# Patient Record
Sex: FEMALE | Race: BLACK OR AFRICAN AMERICAN | NOT HISPANIC OR LATINO | Employment: OTHER | URBAN - METROPOLITAN AREA
[De-identification: names, ages, dates, MRNs, and addresses within clinical notes are randomized per-mention and may not be internally consistent; named-entity substitution may affect disease eponyms.]

---

## 2017-03-16 ENCOUNTER — HOSPITAL ENCOUNTER (EMERGENCY)
Facility: HOSPITAL | Age: 62
Discharge: HOME | End: 2017-03-16
Attending: EMERGENCY MEDICINE

## 2017-03-16 VITALS
BODY MASS INDEX: 39.27 KG/M2 | RESPIRATION RATE: 18 BRPM | HEART RATE: 74 BPM | SYSTOLIC BLOOD PRESSURE: 129 MMHG | TEMPERATURE: 97 F | HEIGHT: 60 IN | DIASTOLIC BLOOD PRESSURE: 61 MMHG | WEIGHT: 200 LBS | OXYGEN SATURATION: 98 %

## 2017-03-16 DIAGNOSIS — M54.31 SCIATICA OF RIGHT SIDE: Primary | ICD-10-CM

## 2017-03-16 PROCEDURE — 63600175 PHARM REV CODE 636 W HCPCS: Performed by: PHYSICIAN ASSISTANT

## 2017-03-16 PROCEDURE — 99283 EMERGENCY DEPT VISIT LOW MDM: CPT | Mod: 25

## 2017-03-16 PROCEDURE — 96372 THER/PROPH/DIAG INJ SC/IM: CPT

## 2017-03-16 RX ORDER — METHYLPREDNISOLONE 4 MG/1
TABLET ORAL
Qty: 1 PACKAGE | Refills: 0 | Status: SHIPPED | OUTPATIENT
Start: 2017-03-16 | End: 2017-04-06

## 2017-03-16 RX ORDER — DEXAMETHASONE SODIUM PHOSPHATE 4 MG/ML
8 INJECTION, SOLUTION INTRA-ARTICULAR; INTRALESIONAL; INTRAMUSCULAR; INTRAVENOUS; SOFT TISSUE
Status: COMPLETED | OUTPATIENT
Start: 2017-03-16 | End: 2017-03-16

## 2017-03-16 RX ORDER — METOPROLOL SUCCINATE 50 MG/1
50 TABLET, EXTENDED RELEASE ORAL DAILY
Status: ON HOLD | COMMUNITY
End: 2019-03-18 | Stop reason: SDUPTHER

## 2017-03-16 RX ORDER — KETOROLAC TROMETHAMINE 30 MG/ML
30 INJECTION, SOLUTION INTRAMUSCULAR; INTRAVENOUS
Status: COMPLETED | OUTPATIENT
Start: 2017-03-16 | End: 2017-03-16

## 2017-03-16 RX ADMIN — KETOROLAC TROMETHAMINE 30 MG: 30 INJECTION, SOLUTION INTRAMUSCULAR at 03:03

## 2017-03-16 RX ADMIN — DEXAMETHASONE SODIUM PHOSPHATE 8 MG: 4 INJECTION, SOLUTION INTRAMUSCULAR; INTRAVENOUS at 03:03

## 2017-03-16 NOTE — DISCHARGE INSTRUCTIONS

## 2017-03-16 NOTE — ED NOTES
Pt c/o lower back pain that radiates down R leg for approx 1 month.  Pt states she saw PCP for compliant and was prescribed gabapentin without relief.  Pt denies trauma.  Pt also denies relief from ibuprofen.

## 2017-03-16 NOTE — ED AVS SNAPSHOT
[facility] [address]  [place] [address]               Christie Damian   3/16/2017  2:41 PM   ED    Description:  Female : 1955   Department:  [facility]           Your Care was Coordinated By:     Provider Role From To    Virginia Peterson MD Attending Provider [phone] --    Caren Gonzalez PA-C Physician Assistant [phone] --      Reason for Visit     Sciatica           Diagnoses this Visit        Comments    Sciatica of right side    -  Primary       ED Disposition     None           To Do List            These Medications        Disp Refills Start End    methylPREDNISolone (MEDROL DOSEPACK) 4 mg tablet 1 Package 0 3/16/2017 2017    Dispense as written      [facility]     [facility] -  Assistance  Registered nurses in the [facility] provide clinical advisement, health education, appointment booking, and other advisory services.  Call for this free service at [phone].             Medications           Message regarding Medications     Verify the changes and/or additions to your medication regime listed below are the same as discussed with your clinician today.  If any of these changes or additions are incorrect, please notify your healthcare provider.        START taking these NEW medications        Refills    methylPREDNISolone (MEDROL DOSEPACK) 4 mg tablet 0    Sig: Dispense as written    Class: Print      These medications were administered today        Dose Freq    dexamethasone injection 8 mg 8 mg ED 1 Time    Sig: Inject 2 mLs (8 mg total) into the muscle ED 1 Time.    Class: Normal    Route: Intramuscular    Cosign for Ordering: Required by Virginia Peterson MD    ketorolac injection 30 mg 30 mg ED 1 Time    Sig: Inject 30 mg into the muscle ED 1 Time.    Class: Normal    Route: Intramuscular    Cosign for Ordering: Required by Virginia Peterson MD      STOP taking these  medications     lisinopril-hydrochlorothiazide (PRINZIDE,ZESTORETIC) 20-12.5 mg per tablet Take 1 tablet by mouth once daily.           Verify that the below list of medications is an accurate representation of the medications you are currently taking.  If none reported, the list may be blank. If incorrect, please contact your healthcare provider. Carry this list with you in case of emergency.           Current Medications     metoprolol succinate (TOPROL-XL) 50 MG 24 hr tablet Take 50 mg by mouth once daily.    amlodipine (NORVASC) 5 MG tablet Take 10 mg by mouth once daily.     aspirin 81 MG Chew Take 81 mg by mouth once daily.    lovastatin (ALTOPREV) 10 mg 24 hr tablet Take 10 mg by mouth every evening.    methylPREDNISolone (MEDROL DOSEPACK) 4 mg tablet Dispense as written           Clinical Reference Information           Your Vitals Were     BP Pulse Temp Resp Height Weight    166/74 84 97.1 °F (36.2 °C) (Oral) 16 5' (1.524 m) 90.7 kg (200 lb)    SpO2 BMI             100% 39.06 kg/m2         Allergies as of 3/16/2017        Reactions    Codeine Nausea And Vomiting    Lisinopril       Immunizations Administered on Date of Encounter - 3/16/2017     None      ED Micro, Lab, POCT     None      ED Imaging Orders     None        Discharge Instructions         Sciatica    Sciatica is a condition that causes pain in the lower back that spreads down into the buttock, hip, and leg. Sometimes the leg pain can happen without any back pain. Sciatica happens when a spinal nerve is irritated or has pressure put on it as comes out of the spinal canal in the lower back. This most often happens when a bulge or rupture of a nearby spinal disk presses on the nerve. Sciatica can also be caused by a narrowing of the spinal canal (spinal stenosis) or spasm of the muscle in the buttocks that the sciatic nerve passes through (pyriform muscle). Sciatica is also called lumbar radiculopathy.  Sciatica may begin after a sudden twisting  or bending force, such as in a car accident. Or it can happen after a simple awkward movement. In either case, muscle spasm often also happens. Muscle spasm makes the pain worse.  A healthcare provider makes a diagnosis of sciatica from your symptoms and a physical exam. Unless you had an injury from a car accident or fall, you usually wont have X-rays taken at this time. This is because the nerves and disks in your back cant be seen on an X-ray. If the provider sees signs of a compressed nerve, you will need to schedule an MRI scan as an outpatient. Signs of a compressed nerve include loss of strength in a leg.  Most sciatica gets better with medicine, exercise, and physical therapy. If your symptoms continue after at least 3 months of medical treatment, you may need surgery or injections to your lower back.  Home care  Follow these tips when caring for yourself at home:  · You may need to stay in bed the first few days. But as soon as possible, begin sitting up or walking. This will help you avoid problems that come from staying in bed for long periods.  · When in bed, try to find a position that is comfortable. A firm mattress is best. Try lying flat on your back with pillows under your knees. You can also try lying on your side with your knees bent up toward your chest and a pillow between your knees.  · Avoid sitting for long periods. This puts more stress on your lower back than standing or walking.  · Use heat from a hot shower, hot bath, or heating pad to help ease pain. Massage can also help. You can also try using an ice pack. You can make your own ice pack by putting ice cubes in a plastic bag. Wrap the bag in a thin towel. Try both heat and cold to see which works best. Use the method that feels best for 20 minutes several times a day.  · You may use acetaminophen or ibuprofen to ease pain, unless another pain medicine was prescribed. Note: If you have chronic liver or kidney disease, talk with your  healthcare provider before taking these medicines. Also talk with your provider if youve had a stomach ulcer or gastrointestinal bleeding.  · Use safe lifting methods. Dont lift anything heavier than 15 pounds until all of the pain is gone.  Follow-up care  Follow up with your healthcare provider, or as advised. You may need physical therapy or additional tests.  If X-rays were taken, a radiologist will look at them. You will be told of any new findings that may affect your care.  When to seek medical advice  Call your healthcare provider right away if any of these occur:  · Pain gets worse even after taking prescribed medicine  · Weakness or numbness in 1 or both legs or hips  · Numbness in your groin or genital area  · You cant control your bowel or bladder  · Fever  · Redness or swelling over your back or spine   Date Last Reviewed: 8/1/2016  © 7086-6956 [employer]. [address] [place] [address]. All rights reserved. This information is not intended as a substitute for professional medical care. Always follow your healthcare professional's instructions.          Your Scheduled Appointments     Mar 21, 2017 10:45 AM CDT   Mammo Screening with [facility]    [facility] [address]  [facility] [id number] [phone]              [facility] Sign-Up     Activating your [facility] account is as easy as 1-2-3!     1) Visit [email or web address], select Sign Up Now, enter this activation code and your date of birth, then select Next.  [email or web address]  Expires: 4/30/2017  3:09 PM      2) Create a username and password to use when you visit [facility] in the future and select a security question in case you lose your password and select Next.    3) Enter your e-mail address and click Sign Up!    Additional Information  If you have questions, please e-mail [email or web address] or call [phone] to talk to our [facility] staff. Remember, [facility] is  NOT to be used for urgent needs. For medical emergencies, dial 911.          [facility] complies with applicable Federal civil rights laws and does not discriminate on the basis of race, color, national origin, age, disability, or sex.        Language Assistance Services     ATTENTION: Language assistance services are available, free of charge. Please call [phone].      ATENCIÓN: Si habla español, tiene a potts disposición servicios gratuitos de asistencia lingüística. Llame al [phone].     CHÚ Ý: N?u b?n nói Ti?ng Vi?t, có các d?ch v? h? tr? ngôn ng? mi?n phí dành cho b?n. G?i s? [phone].

## 2017-03-16 NOTE — ED PROVIDER NOTES
Encounter Date: 3/16/2017       History     Chief Complaint   Patient presents with    Sciatica     right side; pain travels from right buttock to right toes; states pain is sharp and shooting; denies taking pain meds pta     Review of patient's allergies indicates:   Allergen Reactions    Codeine Nausea And Vomiting    Lisinopril      HPI Comments: Christie Damian, a 61 y.o. female that presents to the ED for pain that starts in her right buttock and radiates down her right leg x 4 weeks.  She describes the pain as sharp, shooting, worse with certain movement and worse when sitting.  She was seen by her PCP for this complaint about 2 weeks ago and was prescribed gabapentin 300 mg three times a day, but states that this medication makes her tired, therefore she just takes it at night.  The gabapentin does offer some relief at night time, but she has continued with the pain.  She denies any history of trauma or surgery to her back or hip.  She denies any fever, loss of bowel or bladder.        The history is provided by the patient.     Past Medical History:   Diagnosis Date    Hyperlipidemia     Hypertension      Past Surgical History:   Procedure Laterality Date    HYSTERECTOMY      LEG SURGERY      TONSILLECTOMY       No family history on file.  Social History   Substance Use Topics    Smoking status: Never Smoker    Smokeless tobacco: Not on file    Alcohol use No     Review of Systems   Constitutional: Negative for fever.   HENT: Positive for nosebleeds.    Genitourinary: Negative for dysuria and flank pain.   Musculoskeletal: Positive for arthralgias (R sided hip pain ). Negative for gait problem.   Skin: Negative for color change and rash.   Allergic/Immunologic: Negative for immunocompromised state.   Neurological: Negative for weakness and numbness.   Psychiatric/Behavioral: Negative for agitation and confusion.   All other systems reviewed and are negative.      Physical Exam   Initial Vitals    BP Pulse Resp Temp SpO2   03/16/17 1428 03/16/17 1428 03/16/17 1428 03/16/17 1428 03/16/17 1428   166/74 84 16 97.1 °F (36.2 °C) 100 %     Physical Exam    Nursing note and vitals reviewed.  Constitutional: She appears well-developed and well-nourished. No distress.   HENT:   Head: Normocephalic and atraumatic.   Right Ear: External ear normal.   Left Ear: External ear normal.   Nose: Nose normal.   Eyes: Conjunctivae and EOM are normal.   Neck: Normal range of motion.   Cardiovascular: Normal rate and regular rhythm.   Pulmonary/Chest: No respiratory distress. She has no wheezes. She has no rhonchi. She has no rales.   Abdominal: Soft. Bowel sounds are normal. She exhibits no distension. There is no tenderness. There is no rebound and no guarding.   Musculoskeletal: Normal range of motion.        Right hip: Normal.        Cervical back: Normal.        Thoracic back: Normal.        Lumbar back: Normal.   TTP over iscial tuberosity.  + SLR of right leg.  Increased pain with flexion and extension of spine.  No spinal step offs or spinous process tenderness.     Neurological: She is alert and oriented to person, place, and time. She has normal strength.   Skin: Skin is warm and dry. No rash noted. No erythema.   Psychiatric: She has a normal mood and affect. Thought content normal.         ED Course   Procedures  Labs Reviewed - No data to display          Medical Decision Making:   Initial Assessment:   Hip pain radiating down leg  Differential Diagnosis:   Sciatica, herniated disk, muscle strain   ED Management:  Symptoms and exam most consistent with sciatica.  Toradol and Decadron given with improvement of pain.  Patient was instructed to f/u with PCP for possible referral for physical therapy.  Strict return precautions given and patient verbalized understanding.    RX: medrol               Attending Attestation:     Physician Attestation Statement for NP/PA:   I discussed this assessment and plan of this  patient with the NP/PA, but I did not personally examine the patient. The face to face encounter was performed by the NP/PA.                  ED Course     Clinical Impression:   The encounter diagnosis was Sciatica of right side.          Caren Gonzalez PA-C  03/16/17 2014       Virginia Peterson MD  03/17/17 0409

## 2017-11-23 ENCOUNTER — HOSPITAL ENCOUNTER (EMERGENCY)
Facility: HOSPITAL | Age: 62
Discharge: HOME | End: 2017-11-23
Attending: EMERGENCY MEDICINE
Payer: MEDICARE

## 2017-11-23 VITALS
BODY MASS INDEX: 43 KG/M2 | HEIGHT: 60 IN | RESPIRATION RATE: 18 BRPM | SYSTOLIC BLOOD PRESSURE: 145 MMHG | OXYGEN SATURATION: 99 % | WEIGHT: 219 LBS | DIASTOLIC BLOOD PRESSURE: 65 MMHG | TEMPERATURE: 98 F | HEART RATE: 77 BPM

## 2017-11-23 DIAGNOSIS — M54.31 SCIATICA OF RIGHT SIDE: Primary | ICD-10-CM

## 2017-11-23 DIAGNOSIS — M67.442 GANGLION CYST OF FINGER OF LEFT HAND: ICD-10-CM

## 2017-11-23 PROCEDURE — 63600175 PHARM REV CODE 636 W HCPCS: Performed by: EMERGENCY MEDICINE

## 2017-11-23 PROCEDURE — 99283 EMERGENCY DEPT VISIT LOW MDM: CPT | Mod: 25

## 2017-11-23 PROCEDURE — 96372 THER/PROPH/DIAG INJ SC/IM: CPT

## 2017-11-23 RX ORDER — AMITRIPTYLINE HYDROCHLORIDE 10 MG/1
10 TABLET, FILM COATED ORAL NIGHTLY PRN
COMMUNITY

## 2017-11-23 RX ORDER — MELOXICAM 7.5 MG/1
7.5 TABLET ORAL DAILY
COMMUNITY
End: 2020-12-16

## 2017-11-23 RX ORDER — GABAPENTIN 300 MG/1
300 CAPSULE ORAL 3 TIMES DAILY
COMMUNITY
End: 2020-09-24 | Stop reason: SDUPTHER

## 2017-11-23 RX ORDER — METHOCARBAMOL 750 MG/1
1500 TABLET, FILM COATED ORAL 3 TIMES DAILY
Qty: 30 TABLET | Refills: 0 | Status: SHIPPED | OUTPATIENT
Start: 2017-11-23 | End: 2017-11-28

## 2017-11-23 RX ORDER — ORPHENADRINE CITRATE 30 MG/ML
60 INJECTION INTRAMUSCULAR; INTRAVENOUS
Status: COMPLETED | OUTPATIENT
Start: 2017-11-23 | End: 2017-11-23

## 2017-11-23 RX ORDER — KETOROLAC TROMETHAMINE 30 MG/ML
60 INJECTION, SOLUTION INTRAMUSCULAR; INTRAVENOUS
Status: COMPLETED | OUTPATIENT
Start: 2017-11-23 | End: 2017-11-23

## 2017-11-23 RX ORDER — OXYCODONE AND ACETAMINOPHEN 5; 325 MG/1; MG/1
1 TABLET ORAL EVERY 4 HOURS PRN
Qty: 15 TABLET | Refills: 0 | Status: SHIPPED | OUTPATIENT
Start: 2017-11-23 | End: 2018-04-11 | Stop reason: SDUPTHER

## 2017-11-23 RX ORDER — METFORMIN HYDROCHLORIDE 500 MG/1
500 TABLET ORAL 2 TIMES DAILY WITH MEALS
COMMUNITY
End: 2022-06-16 | Stop reason: DRUGHIGH

## 2017-11-23 RX ADMIN — KETOROLAC TROMETHAMINE 60 MG: 30 INJECTION, SOLUTION INTRAMUSCULAR at 03:11

## 2017-11-23 RX ADMIN — ORPHENADRINE CITRATE 60 MG: 30 INJECTION INTRAMUSCULAR; INTRAVENOUS at 03:11

## 2017-11-23 NOTE — ED NOTES
Pt sitting in on edge of bed, AAO x's 3. Pt stated that she came to the ER with c/o sciatic back pain on the right side that goes down her leg. Pt stated that she has a hx and usually she gets steroids that helps relieve the pain.   APPEARANCE: Alert, oriented and in no acute distress.  CARDIAC: Normal rate and rhythm, no murmur heard.   PERIPHERAL VASCULAR: peripheral pulses present. Normal cap refill. No edema. Warm to touch.    RESPIRATORY:Normal rate and effort, breath sounds clear bilaterally throughout chest. Respirations are equal and unlabored no obvious signs of distress.  GASTRO: soft, bowel sounds normal, no tenderness, no abdominal distention.  MUSC: Full ROM. No bony tenderness or soft tissue tenderness. No obvious deformity.  SKIN: Skin is warm and dry, normal skin turgor, mucous membranes moist.  NEURO: 5/5 strength major flexors/extensors bilaterally. Sensory intact to light touch bilaterally. Derek coma scale: eyes open spontaneously-4, oriented & converses-5, obeys commands-6. No neurological abnormalities.   MENTAL STATUS: awake, alert and aware of environment.  EYE: PERRL, both eyes: pupils brisk and reactive to light. Normal size.  ENT: EARS: no obvious drainage. NOSE: no active bleeding.

## 2017-11-23 NOTE — ED PROVIDER NOTES
Encounter Date: 11/23/2017       History     Chief Complaint   Patient presents with    Sciatica     Reports sciatic pain to R lower back radiating down R leg x1 week    Finger Pain     L middle finger, denies injury     Patient is a 62-year-old female who complains of a one-week history of right lower back pain that radiates into her right leg.  She denies any trauma or heavy lifting.  No abdominal pain, nausea or vomiting.  She has no dysuria or hematuria.  No fever or chills.  She denies any leg weakness or numbness.  She has a history of sciatica.  Patient also complains of a painful nodule to her left ring finger.  No finger trauma.  She is unsure how long the nodule has been there.      The history is provided by the patient.     Review of patient's allergies indicates:   Allergen Reactions    Codeine Nausea And Vomiting    Lisinopril      Past Medical History:   Diagnosis Date    Hyperlipidemia     Hypertension     Pre-diabetes     Sciatica      Past Surgical History:   Procedure Laterality Date    HYSTERECTOMY      LEG SURGERY      TONSILLECTOMY       History reviewed. No pertinent family history.  Social History   Substance Use Topics    Smoking status: Never Smoker    Smokeless tobacco: Never Used    Alcohol use No     Review of Systems   Constitutional: Negative for chills and fever.   Cardiovascular: Negative for chest pain.   Gastrointestinal: Negative for abdominal pain, diarrhea, nausea and vomiting.   Genitourinary: Negative for difficulty urinating, dysuria and hematuria.   Musculoskeletal: Positive for back pain.   Skin: Negative for color change and rash.   Neurological: Negative for numbness.   All other systems reviewed and are negative.      Physical Exam     Initial Vitals [11/23/17 1442]   BP Pulse Resp Temp SpO2   (!) 145/65 77 18 97.8 °F (36.6 °C) 99 %      MAP       91.67         Physical Exam    Nursing note and vitals reviewed.  Constitutional: No distress.   HENT:   Head:  Normocephalic and atraumatic.   Eyes: EOM are normal.   Neck: Normal range of motion. Neck supple.   Cardiovascular: Normal rate, regular rhythm and normal heart sounds.   Pulmonary/Chest: Breath sounds normal.   Abdominal: Soft. There is no tenderness.   Musculoskeletal: Normal range of motion.   There is a small approximately 1 cm tender mobile nodule of the left ring finger dorsal aspect.  No overlying erythema.    There was tenderness of the right lower lumbar paraspinous muscle extending on the upper right buttock.   Neurological: She is alert and oriented to person, place, and time. She has normal strength and normal reflexes. No sensory deficit.   Skin: Skin is warm and dry.   Psychiatric: Her behavior is normal. Thought content normal.         ED Course   Procedures  Labs Reviewed - No data to display          Medical Decision Making:   ED Management:  62-year-old female sciatica.  Patient was given 60 mg of Toradol and 60 mg of Norflex intramuscularly here in the ED.  She also seems to have a ganglion cyst of the left ring finger.  Patient is taking meloxicam for back pain with only minimal relief.  I will place her on Robaxin and Percocet.  I have suggested to follow-up with her primary physician as soon as able for recheck and further treatment as warranted.                   ED Course      Clinical Impression:   The primary encounter diagnosis was Sciatica of right side. A diagnosis of Ganglion cyst of finger of left hand was also pertinent to this visit.                           Wellington Aguillon MD  11/23/17 9063

## 2018-04-11 ENCOUNTER — HOSPITAL ENCOUNTER (EMERGENCY)
Facility: HOSPITAL | Age: 63
Discharge: HOME | End: 2018-04-11
Attending: EMERGENCY MEDICINE
Payer: MEDICARE

## 2018-04-11 VITALS
BODY MASS INDEX: 43.98 KG/M2 | TEMPERATURE: 98 F | DIASTOLIC BLOOD PRESSURE: 65 MMHG | HEIGHT: 60 IN | HEART RATE: 69 BPM | SYSTOLIC BLOOD PRESSURE: 121 MMHG | RESPIRATION RATE: 19 BRPM | WEIGHT: 224 LBS | OXYGEN SATURATION: 98 %

## 2018-04-11 DIAGNOSIS — M54.31 SCIATICA OF RIGHT SIDE: Primary | ICD-10-CM

## 2018-04-11 PROCEDURE — 99283 EMERGENCY DEPT VISIT LOW MDM: CPT | Mod: 25

## 2018-04-11 PROCEDURE — 63600175 PHARM REV CODE 636 W HCPCS: Performed by: PHYSICIAN ASSISTANT

## 2018-04-11 PROCEDURE — 96372 THER/PROPH/DIAG INJ SC/IM: CPT

## 2018-04-11 RX ORDER — KETOROLAC TROMETHAMINE 30 MG/ML
30 INJECTION, SOLUTION INTRAMUSCULAR; INTRAVENOUS
Status: COMPLETED | OUTPATIENT
Start: 2018-04-11 | End: 2018-04-11

## 2018-04-11 RX ORDER — OXYCODONE AND ACETAMINOPHEN 5; 325 MG/1; MG/1
1 TABLET ORAL EVERY 4 HOURS PRN
Qty: 15 TABLET | Refills: 0 | Status: SHIPPED | OUTPATIENT
Start: 2018-04-11 | End: 2022-06-16

## 2018-04-11 RX ORDER — METHOCARBAMOL 750 MG/1
1500 TABLET, FILM COATED ORAL 3 TIMES DAILY
Qty: 30 TABLET | Refills: 0 | Status: SHIPPED | OUTPATIENT
Start: 2018-04-11 | End: 2018-04-16

## 2018-04-11 RX ADMIN — KETOROLAC TROMETHAMINE 30 MG: 30 INJECTION, SOLUTION INTRAMUSCULAR at 02:04

## 2018-04-11 NOTE — ED PROVIDER NOTES
Encounter Date: 4/11/2018       History     Chief Complaint   Patient presents with    Leg Pain     Reports has sciatic nerve pain to RLE. Reports has been flaring up over the past week. Also complaining of R shoulder pain.      Christie Damian, a 62 y.o. female that presents to pain to right posterior hip area that is consistent with her previous sciatica flares.  Patient describes the pain as burning with radiation down her posterior leg.  Pain is worse with prolonged standing and slightly better with rest.  Treatments tried include ingestion of ibuprofen and gabapentin with little improvement of her pain.  She has not seen her PCP for this issue.  Denies any loss of bowel or bladder, fever.           The history is provided by the patient.     Review of patient's allergies indicates:   Allergen Reactions    Codeine Nausea And Vomiting    Lisinopril      Past Medical History:   Diagnosis Date    Hyperlipidemia     Hypertension     Pre-diabetes     Sciatica      Past Surgical History:   Procedure Laterality Date    HYSTERECTOMY      LEG SURGERY      TONSILLECTOMY       History reviewed. No pertinent family history.  Social History   Substance Use Topics    Smoking status: Never Smoker    Smokeless tobacco: Never Used    Alcohol use No     Review of Systems   Constitutional: Negative for fever.   Respiratory: Negative for shortness of breath.    Cardiovascular: Negative for chest pain and palpitations.   Musculoskeletal: Positive for arthralgias (right posterior hip pain that radiates down leg ). Negative for gait problem and neck stiffness.   Skin: Negative for color change and rash.   Allergic/Immunologic: Negative for immunocompromised state.   Neurological: Negative for dizziness.   Hematological: Negative for adenopathy.   Psychiatric/Behavioral: Negative for agitation and confusion.   All other systems reviewed and are negative.      Physical Exam     Initial Vitals [04/11/18 1349]   BP Pulse  Resp Temp SpO2   121/65 69 19 98.3 °F (36.8 °C) 98 %      MAP       83.67         Physical Exam    Nursing note and vitals reviewed.  Constitutional: She appears well-developed and well-nourished. She is not diaphoretic. No distress.   HENT:   Head: Normocephalic and atraumatic.   Right Ear: External ear normal.   Left Ear: External ear normal.   Nose: Nose normal.   Mouth/Throat: Oropharynx is clear and moist.   Eyes: Conjunctivae and EOM are normal. Right eye exhibits no discharge. Left eye exhibits no discharge. No scleral icterus.   Neck: Normal range of motion. No tracheal deviation present.   Cardiovascular: Normal rate, regular rhythm and normal heart sounds. Exam reveals no gallop and no friction rub.    No murmur heard.  Pulmonary/Chest: Breath sounds normal. No respiratory distress. She has no wheezes. She has no rhonchi. She has no rales.   Abdominal: Soft. Bowel sounds are normal. She exhibits no distension. There is no tenderness. There is no rebound and no guarding.   Musculoskeletal: Normal range of motion.        Right hip: Normal.        Legs:  Neurological: She is alert and oriented to person, place, and time.   Skin: Skin is warm and dry. Capillary refill takes less than 2 seconds.   Psychiatric: She has a normal mood and affect. Thought content normal.         ED Course   Procedures  Labs Reviewed - No data to display          Medical Decision Making:   Initial Assessment:   Right sided posterior hip pain with radiation down posterior leg  Differential Diagnosis:   Sciatica, herniated disk, muscle strain  ED Management:  Symptoms and exam most consistent with sciatica.  Toradol and Decadron given with improvement of pain.  Patient was instructed to f/u with PCP for possible referral for physical therapy.  Strict return precautions given and patient verbalized understanding.                Attending Attestation:     Physician Attestation Statement for NP/PA:   I discussed this assessment and plan  of this patient with the NP/PA, but I did not personally examine the patient. The face to face encounter was performed by the NP/PA.                     Clinical Impression:   The encounter diagnosis was Sciatica of right side.                           Caren Gonzalez PA-C  04/11/18 1449       Wellington Aguillon MD  04/11/18 0383

## 2018-10-20 ENCOUNTER — HOSPITAL ENCOUNTER (EMERGENCY)
Facility: HOSPITAL | Age: 63
Discharge: HOME | End: 2018-10-20
Attending: EMERGENCY MEDICINE
Payer: MEDICARE

## 2018-10-20 VITALS
DIASTOLIC BLOOD PRESSURE: 82 MMHG | OXYGEN SATURATION: 99 % | HEART RATE: 74 BPM | BODY MASS INDEX: 43.19 KG/M2 | SYSTOLIC BLOOD PRESSURE: 140 MMHG | RESPIRATION RATE: 16 BRPM | TEMPERATURE: 98 F | WEIGHT: 220 LBS | HEIGHT: 60 IN
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
## 2018-10-20 PROCEDURE — 99284 EMERGENCY DEPT VISIT MOD MDM: CPT

## 2018-10-20 PROCEDURE — 25000003 PHARM REV CODE 250: Performed by: PHYSICIAN ASSISTANT

## 2018-10-20 RX ORDER — HYDROXYZINE PAMOATE 25 MG/1
25 CAPSULE ORAL
Status: COMPLETED | OUTPATIENT
Start: 2018-10-20 | End: 2018-10-20

## 2018-10-20 RX ORDER — HYDROXYZINE HYDROCHLORIDE 25 MG/1
25 TABLET, FILM COATED ORAL 3 TIMES DAILY PRN
Qty: 15 TABLET | Refills: 0 | Status: SHIPPED | OUTPATIENT
Start: 2018-10-20 | End: 2018-10-25

## 2018-10-20 RX ORDER — TRIAMCINOLONE ACETONIDE 1 MG/G
CREAM TOPICAL 2 TIMES DAILY
Qty: 15 G | Refills: 0 | Status: SHIPPED | OUTPATIENT
Start: 2018-10-20 | End: 2022-06-16

## 2018-10-20 RX ADMIN — HYDROXYZINE PAMOATE 25 MG: 25 CAPSULE ORAL at 01:10

## 2018-10-20 NOTE — ED PROVIDER NOTES
Encounter Date: 10/20/2018       History     Chief Complaint   Patient presents with    Rash     c/o itchy scattered generalized rash since last night. Recently changed detergents.     62 yo female presents to the ED with complaints of a rash and itching x 1 day. Patient states her son bought the wrong detergent, she washed her clothes in it, wore them, and broke out in a rash yesterday. She states she is sensitive to detergents and perfumes. She has been applying calamine lotion without relief. Denies wheezing, difficulty breathing.       The history is provided by the patient. No  was used.     Review of patient's allergies indicates:   Allergen Reactions    Codeine Nausea And Vomiting    Lisinopril      Past Medical History:   Diagnosis Date    Hyperlipidemia     Hypertension     Pre-diabetes     Sciatica      Past Surgical History:   Procedure Laterality Date    HYSTERECTOMY      LEG SURGERY      TONSILLECTOMY       No family history on file.  Social History     Tobacco Use    Smoking status: Never Smoker    Smokeless tobacco: Never Used   Substance Use Topics    Alcohol use: No    Drug use: Not on file     Review of Systems   Respiratory: Negative for shortness of breath and wheezing.    Skin: Positive for rash.        Itching   All other systems reviewed and are negative.      Physical Exam     Initial Vitals [10/20/18 1232]   BP Pulse Resp Temp SpO2   (!) 145/70 62 16 98.7 °F (37.1 °C) 97 %      MAP       --         Physical Exam    Nursing note and vitals reviewed.  Constitutional: Vital signs are normal. She appears well-developed and well-nourished. No distress.   HENT:   Head: Normocephalic and atraumatic.   Nose: Nose normal.   Eyes: Lids are normal.   Neck: Normal range of motion and phonation normal.   Cardiovascular: Normal rate.   Pulmonary/Chest: Effort normal.   Abdominal: Normal appearance.   Musculoskeletal: Normal range of motion.   Neurological: She is alert  and oriented to person, place, and time.   Skin: Skin is warm and dry.   Multiple individual, raised erythematous papules to arms and legs and few to back of neck, consistent with insect bites.  See photo   Psychiatric: She has a normal mood and affect. Her speech is normal and behavior is normal. Judgment and thought content normal. Cognition and memory are normal.                 ED Course   Procedures  Labs Reviewed - No data to display       Imaging Results    None          Medical Decision Making:   Initial Assessment:   63-year-old female with multiple insect bites to arms and legs and few on back of neck.  Several of the bites appear to be any linear alignment, consistent with possible bed bugs.  The location and appearance of the bites is inconsistent with allergic reaction.   Differential Diagnosis:   Bed bugs, other insect bite  ED Management:  Given Atarax in ED.  Patient will be discharged with prescription for Atarax and triamcinolone and instructed to check her mattress for bedbugs and follow up with PCP as soon as possible.  Return to ED with any worsening of condition.                   ED Course as of Oct 20 1659   Sat Oct 20, 2018   1610 Attestation: The patient was seen independently from the midlevel or resident. The management and disposition was discussed with me.   The patient presents emergency department with multiple insect bites to her left leg and left forearm and a few on her neck and back.  The bite slip consistent with insect bites on the patient may have bed bugs, she will need to check her mattress.  The location of the insect bites his inconsistent with shingles and the appearance of the lesions is inconsistent with an allergic reaction. She will be discharged with atarax for itching.  [ST]      ED Course User Index  [ST] Carmencita Rg MD     Clinical Impression:   The encounter diagnosis was Multiple insect bites.                             Caro Garcia PA-C  10/20/18  [id number]

## 2018-10-20 NOTE — ED NOTES
Patient states her son brought home the wrong detergent Monday.  After washing clothes with it, she broke out in a rash.  She reports having sensitive skin and does react to perfume in products.  She has since re-washed all her clothes.  CO severe itching.    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

## 2019-03-17 ENCOUNTER — HOSPITAL ENCOUNTER (OUTPATIENT)
Facility: HOSPITAL | Age: 64
Discharge: HOME | End: 2019-03-18
Attending: EMERGENCY MEDICINE | Admitting: HOSPITALIST
Payer: COMMERCIAL

## 2019-03-17 DIAGNOSIS — E78.2 MIXED HYPERLIPIDEMIA: ICD-10-CM

## 2019-03-17 DIAGNOSIS — E11.9 CONTROLLED TYPE 2 DIABETES MELLITUS WITHOUT COMPLICATION, WITHOUT LONG-TERM CURRENT USE OF INSULIN: ICD-10-CM

## 2019-03-17 DIAGNOSIS — I51.89 GRADE I DIASTOLIC DYSFUNCTION: ICD-10-CM

## 2019-03-17 DIAGNOSIS — R07.9 CHEST PAIN: Primary | ICD-10-CM

## 2019-03-17 DIAGNOSIS — R06.02 SHORTNESS OF BREATH: ICD-10-CM

## 2019-03-17 DIAGNOSIS — I10 ESSENTIAL HYPERTENSION: ICD-10-CM

## 2019-03-17 DIAGNOSIS — R06.00 DYSPNEA: ICD-10-CM

## 2019-03-17 DIAGNOSIS — J98.11 ATELECTASIS: ICD-10-CM

## 2019-03-17 DIAGNOSIS — R06.02 SOB (SHORTNESS OF BREATH): ICD-10-CM

## 2019-03-17 PROBLEM — E78.5 HYPERLIPIDEMIA: Status: ACTIVE | Noted: 2019-03-17

## 2019-03-17 PROBLEM — R73.03 PRE-DIABETES: Status: ACTIVE | Noted: 2019-03-17

## 2019-03-17 LAB
ALBUMIN SERPL BCP-MCNC: 4.2 G/DL
ALP SERPL-CCNC: 75 U/L
ALT SERPL W/O P-5'-P-CCNC: 38 U/L
ANION GAP SERPL CALC-SCNC: 9 MMOL/L
AST SERPL-CCNC: 26 U/L
BASOPHILS # BLD AUTO: 0.02 K/UL
BASOPHILS NFR BLD: 0.3 %
BILIRUB SERPL-MCNC: 0.2 MG/DL
BNP SERPL-MCNC: 29 PG/ML
BUN SERPL-MCNC: 16 MG/DL
CALCIUM SERPL-MCNC: 10.2 MG/DL
CHLORIDE SERPL-SCNC: 104 MMOL/L
CO2 SERPL-SCNC: 27 MMOL/L
CREAT SERPL-MCNC: 0.9 MG/DL
D DIMER PPP IA.FEU-MCNC: 0.21 MG/L FEU
DIFFERENTIAL METHOD: ABNORMAL
EOSINOPHIL # BLD AUTO: 0.1 K/UL
EOSINOPHIL NFR BLD: 2.1 %
ERYTHROCYTE [DISTWIDTH] IN BLOOD BY AUTOMATED COUNT: 14.7 %
EST. GFR  (AFRICAN AMERICAN): >60 ML/MIN/1.73 M^2
EST. GFR  (NON AFRICAN AMERICAN): >60 ML/MIN/1.73 M^2
GLUCOSE SERPL-MCNC: 111 MG/DL
HCT VFR BLD AUTO: 36.8 %
HGB BLD-MCNC: 11.4 G/DL
LYMPHOCYTES # BLD AUTO: 4.4 K/UL
LYMPHOCYTES NFR BLD: 64.4 %
MCH RBC QN AUTO: 27 PG
MCHC RBC AUTO-ENTMCNC: 31 G/DL
MCV RBC AUTO: 87 FL
MONOCYTES # BLD AUTO: 0.5 K/UL
MONOCYTES NFR BLD: 7.2 %
NEUTROPHILS # BLD AUTO: 1.8 K/UL
NEUTROPHILS NFR BLD: 25.9 %
PLATELET # BLD AUTO: 281 K/UL
PMV BLD AUTO: 10.3 FL
POCT GLUCOSE: 104 MG/DL (ref 70–110)
POTASSIUM SERPL-SCNC: 3.8 MMOL/L
PROT SERPL-MCNC: 7.4 G/DL
RBC # BLD AUTO: 4.23 M/UL
SODIUM SERPL-SCNC: 140 MMOL/L
TROPONIN I SERPL DL<=0.01 NG/ML-MCNC: <0.006 NG/ML
WBC # BLD AUTO: 6.82 K/UL

## 2019-03-17 PROCEDURE — G0378 HOSPITAL OBSERVATION PER HR: HCPCS

## 2019-03-17 PROCEDURE — 93005 ELECTROCARDIOGRAM TRACING: CPT

## 2019-03-17 PROCEDURE — 80053 COMPREHEN METABOLIC PANEL: CPT

## 2019-03-17 PROCEDURE — 85025 COMPLETE CBC W/AUTO DIFF WBC: CPT

## 2019-03-17 PROCEDURE — 63600175 PHARM REV CODE 636 W HCPCS: Performed by: EMERGENCY MEDICINE

## 2019-03-17 PROCEDURE — 84484 ASSAY OF TROPONIN QUANT: CPT | Mod: 91

## 2019-03-17 PROCEDURE — 99220 PR INITIAL OBSERVATION CARE,LEVL III: CPT | Mod: ,,, | Performed by: FAMILY MEDICINE

## 2019-03-17 PROCEDURE — 99285 EMERGENCY DEPT VISIT HI MDM: CPT | Mod: 25

## 2019-03-17 PROCEDURE — 63600175 PHARM REV CODE 636 W HCPCS: Performed by: FAMILY MEDICINE

## 2019-03-17 PROCEDURE — 85379 FIBRIN DEGRADATION QUANT: CPT

## 2019-03-17 PROCEDURE — 96376 TX/PRO/DX INJ SAME DRUG ADON: CPT

## 2019-03-17 PROCEDURE — 93010 EKG 12-LEAD: ICD-10-PCS | Mod: ,,, | Performed by: INTERNAL MEDICINE

## 2019-03-17 PROCEDURE — 25000003 PHARM REV CODE 250: Performed by: EMERGENCY MEDICINE

## 2019-03-17 PROCEDURE — 84484 ASSAY OF TROPONIN QUANT: CPT

## 2019-03-17 PROCEDURE — 93010 ELECTROCARDIOGRAM REPORT: CPT | Mod: ,,, | Performed by: INTERNAL MEDICINE

## 2019-03-17 PROCEDURE — 83036 HEMOGLOBIN GLYCOSYLATED A1C: CPT

## 2019-03-17 PROCEDURE — 96374 THER/PROPH/DIAG INJ IV PUSH: CPT

## 2019-03-17 PROCEDURE — 83880 ASSAY OF NATRIURETIC PEPTIDE: CPT

## 2019-03-17 PROCEDURE — 25000003 PHARM REV CODE 250: Performed by: FAMILY MEDICINE

## 2019-03-17 PROCEDURE — 99220 PR INITIAL OBSERVATION CARE,LEVL III: ICD-10-PCS | Mod: ,,, | Performed by: FAMILY MEDICINE

## 2019-03-17 RX ORDER — NAPROXEN SODIUM 220 MG/1
81 TABLET, FILM COATED ORAL DAILY
Status: DISCONTINUED | OUTPATIENT
Start: 2019-03-18 | End: 2019-03-18 | Stop reason: HOSPADM

## 2019-03-17 RX ORDER — HYDRALAZINE HYDROCHLORIDE 20 MG/ML
10 INJECTION INTRAMUSCULAR; INTRAVENOUS EVERY 8 HOURS PRN
Status: DISCONTINUED | OUTPATIENT
Start: 2019-03-17 | End: 2019-03-18 | Stop reason: HOSPADM

## 2019-03-17 RX ORDER — GLUCAGON 1 MG
1 KIT INJECTION
Status: DISCONTINUED | OUTPATIENT
Start: 2019-03-17 | End: 2019-03-18 | Stop reason: HOSPADM

## 2019-03-17 RX ORDER — HYDRALAZINE HYDROCHLORIDE 20 MG/ML
10 INJECTION INTRAMUSCULAR; INTRAVENOUS EVERY 8 HOURS PRN
Status: DISCONTINUED | OUTPATIENT
Start: 2019-03-17 | End: 2019-03-17 | Stop reason: SDUPTHER

## 2019-03-17 RX ORDER — AMLODIPINE BESYLATE 5 MG/1
10 TABLET ORAL DAILY
Status: DISCONTINUED | OUTPATIENT
Start: 2019-03-18 | End: 2019-03-18

## 2019-03-17 RX ORDER — ONDANSETRON 2 MG/ML
4 INJECTION INTRAMUSCULAR; INTRAVENOUS EVERY 8 HOURS PRN
Status: DISCONTINUED | OUTPATIENT
Start: 2019-03-17 | End: 2019-03-18 | Stop reason: HOSPADM

## 2019-03-17 RX ORDER — HYDRALAZINE HYDROCHLORIDE 20 MG/ML
10 INJECTION INTRAMUSCULAR; INTRAVENOUS
Status: DISCONTINUED | OUTPATIENT
Start: 2019-03-17 | End: 2019-03-18

## 2019-03-17 RX ORDER — HYDRALAZINE HYDROCHLORIDE 20 MG/ML
10 INJECTION INTRAMUSCULAR; INTRAVENOUS
Status: COMPLETED | OUTPATIENT
Start: 2019-03-17 | End: 2019-03-17

## 2019-03-17 RX ORDER — LOSARTAN POTASSIUM 50 MG/1
100 TABLET ORAL ONCE
Status: COMPLETED | OUTPATIENT
Start: 2019-03-17 | End: 2019-03-17

## 2019-03-17 RX ORDER — ACETAMINOPHEN 325 MG/1
650 TABLET ORAL EVERY 8 HOURS PRN
Status: DISCONTINUED | OUTPATIENT
Start: 2019-03-17 | End: 2019-03-18 | Stop reason: HOSPADM

## 2019-03-17 RX ORDER — SODIUM CHLORIDE 0.9 % (FLUSH) 0.9 %
3 SYRINGE (ML) INJECTION EVERY 8 HOURS
Status: DISCONTINUED | OUTPATIENT
Start: 2019-03-18 | End: 2019-03-18 | Stop reason: HOSPADM

## 2019-03-17 RX ORDER — HYDRALAZINE HYDROCHLORIDE 20 MG/ML
10 INJECTION INTRAMUSCULAR; INTRAVENOUS ONCE
Status: COMPLETED | OUTPATIENT
Start: 2019-03-17 | End: 2019-03-17

## 2019-03-17 RX ORDER — IBUPROFEN 200 MG
24 TABLET ORAL
Status: DISCONTINUED | OUTPATIENT
Start: 2019-03-17 | End: 2019-03-18 | Stop reason: HOSPADM

## 2019-03-17 RX ORDER — AMITRIPTYLINE HYDROCHLORIDE 10 MG/1
10 TABLET, FILM COATED ORAL NIGHTLY PRN
Status: DISCONTINUED | OUTPATIENT
Start: 2019-03-17 | End: 2019-03-18 | Stop reason: HOSPADM

## 2019-03-17 RX ORDER — ATORVASTATIN CALCIUM 10 MG/1
10 TABLET, FILM COATED ORAL DAILY
Status: DISCONTINUED | OUTPATIENT
Start: 2019-03-18 | End: 2019-03-18 | Stop reason: HOSPADM

## 2019-03-17 RX ORDER — INSULIN ASPART 100 [IU]/ML
0-5 INJECTION, SOLUTION INTRAVENOUS; SUBCUTANEOUS
Status: DISCONTINUED | OUTPATIENT
Start: 2019-03-17 | End: 2019-03-18 | Stop reason: HOSPADM

## 2019-03-17 RX ORDER — GABAPENTIN 300 MG/1
300 CAPSULE ORAL 3 TIMES DAILY
Status: DISCONTINUED | OUTPATIENT
Start: 2019-03-18 | End: 2019-03-18 | Stop reason: HOSPADM

## 2019-03-17 RX ORDER — ASPIRIN 325 MG
325 TABLET ORAL
Status: COMPLETED | OUTPATIENT
Start: 2019-03-17 | End: 2019-03-17

## 2019-03-17 RX ORDER — SODIUM CHLORIDE 0.9 % (FLUSH) 0.9 %
5 SYRINGE (ML) INJECTION
Status: DISCONTINUED | OUTPATIENT
Start: 2019-03-17 | End: 2019-03-18 | Stop reason: HOSPADM

## 2019-03-17 RX ORDER — IBUPROFEN 200 MG
16 TABLET ORAL
Status: DISCONTINUED | OUTPATIENT
Start: 2019-03-17 | End: 2019-03-18 | Stop reason: HOSPADM

## 2019-03-17 RX ORDER — METOPROLOL SUCCINATE 50 MG/1
50 TABLET, EXTENDED RELEASE ORAL DAILY
Status: DISCONTINUED | OUTPATIENT
Start: 2019-03-18 | End: 2019-03-18 | Stop reason: HOSPADM

## 2019-03-17 RX ADMIN — HYDRALAZINE HYDROCHLORIDE 10 MG: 20 INJECTION INTRAMUSCULAR; INTRAVENOUS at 05:03

## 2019-03-17 RX ADMIN — HYDRALAZINE HYDROCHLORIDE 10 MG: 20 INJECTION INTRAMUSCULAR; INTRAVENOUS at 10:03

## 2019-03-17 RX ADMIN — ASPIRIN 325 MG ORAL TABLET 325 MG: 325 PILL ORAL at 07:03

## 2019-03-17 RX ADMIN — HYDRALAZINE HYDROCHLORIDE 10 MG: 20 INJECTION INTRAMUSCULAR; INTRAVENOUS at 08:03

## 2019-03-17 RX ADMIN — LOSARTAN POTASSIUM 100 MG: 50 TABLET, FILM COATED ORAL at 10:03

## 2019-03-17 RX ADMIN — LOSARTAN POTASSIUM 100 MG: 50 TABLET, FILM COATED ORAL at 06:03

## 2019-03-17 RX ADMIN — NITROGLYCERIN 0.5 INCH: 20 OINTMENT TOPICAL at 07:03

## 2019-03-17 NOTE — ED NOTES
Pt c/o htn for the past few days, and states it was 228/108 at the pharmacy today pta. Reports she has been taking her bp medications as prescribed, but states her losartan was recalled so she did not take it today and went to the pharmacy to exchange it for her new prescription today. Pt also c/o nausea, dizziness, headache, shortness of breath, and chest pain for the past 2 days. Denies any pain at this time, but c/o some SOB on exertion. Respirations even, unlabored. Normal sinus rhythm on monitor. No neurologic deficits noted.

## 2019-03-17 NOTE — ED PROVIDER NOTES
Encounter Date: 3/17/2019    SCRIBE #1 NOTE: I, Angie Flynn, am scribing for, and in the presence of,  Dr. Blake. I have scribed the entire note.       History     Chief Complaint   Patient presents with    Hypertension     took home b/p twice, with reading over 200, pt also complains of cheast pain x 2 days,  and intermitent CAMPOS x 1 week      Christie Damian is a 63 y.o. female who  has a past medical history of Hyperlipidemia, Hypertension, Pre-diabetes, and Sciatica.    The patient presents to the ED due to elevated blood pressure. She reports onset of symptoms was 3 weeks ago. The patient wasn't concerned about her pressure being elevated until she began to feel bad. She notes over the last 2 days she has been feeling nauseated, dizzy and short of breath with chest pain. The patient denies any chest pain currently. She also admits to having a headache. She notes earlier today while at the pharmacy her blood pressure was 228/108. The patient states she has been compliant with her hypertensive medications.       The history is provided by the patient.     Review of patient's allergies indicates:   Allergen Reactions    Codeine Nausea And Vomiting    Lisinopril      Past Medical History:   Diagnosis Date    Hyperlipidemia     Hypertension     Pre-diabetes     Sciatica      Past Surgical History:   Procedure Laterality Date    HYSTERECTOMY      LEG SURGERY      TONSILLECTOMY       No family history on file.  Social History     Tobacco Use    Smoking status: Never Smoker    Smokeless tobacco: Never Used   Substance Use Topics    Alcohol use: No    Drug use: Not on file     Review of Systems   Constitutional: Negative for chills and fever.   HENT: Negative for sore throat.    Respiratory: Positive for shortness of breath. Negative for cough.    Cardiovascular: Negative for chest pain.   Gastrointestinal: Positive for nausea. Negative for vomiting.   Genitourinary: Negative for dysuria, frequency  and urgency.   Musculoskeletal: Negative for back pain, neck pain and neck stiffness.   Skin: Negative for rash and wound.   Neurological: Positive for headaches. Negative for syncope and weakness.   Hematological: Does not bruise/bleed easily.   Psychiatric/Behavioral: Negative for agitation, behavioral problems and confusion.       Physical Exam     Initial Vitals [03/17/19 1615]   BP Pulse Resp Temp SpO2   (!) 198/100 75 18 97.8 °F (36.6 °C) 98 %      MAP       --         Physical Exam    Nursing note and vitals reviewed.  Constitutional: She appears well-developed and well-nourished. She is not diaphoretic. No distress.   HENT:   Head: Normocephalic and atraumatic.   Mouth/Throat: Oropharynx is clear and moist.   Eyes: Conjunctivae and EOM are normal. Pupils are equal, round, and reactive to light.   Neck: Normal range of motion. Neck supple.   Cardiovascular: Normal rate, regular rhythm and normal heart sounds. Exam reveals no gallop and no friction rub.    No murmur heard.  Pulmonary/Chest: Breath sounds normal. She has no wheezes. She has no rhonchi. She has no rales.   Abdominal: Soft. Bowel sounds are normal. There is no tenderness. There is no rebound and no guarding.   Musculoskeletal: Normal range of motion. She exhibits no edema or tenderness.   Lymphadenopathy:     She has no cervical adenopathy.   Neurological: She is alert and oriented to person, place, and time. She has normal strength.   Skin: Skin is warm and dry. Capillary refill takes less than 2 seconds. No rash noted.         ED Course   Procedures  Labs Reviewed   COMPREHENSIVE METABOLIC PANEL - Abnormal; Notable for the following components:       Result Value    Glucose 111 (*)     All other components within normal limits   CBC W/ AUTO DIFFERENTIAL - Abnormal; Notable for the following components:    Hemoglobin 11.4 (*)     Hematocrit 36.8 (*)     MCHC 31.0 (*)     RDW 14.7 (*)     Gran% 25.9 (*)     Lymph% 64.4 (*)     All other  components within normal limits   D DIMER, QUANTITATIVE   B-TYPE NATRIURETIC PEPTIDE   TROPONIN I     EKG Readings: (Independently Interpreted)   Sinus rhythm with 1st degree AV block. Ventricular rate of 64. T wave inversion in lead III. No STEMI   Other EKG Interpretations: Repeat EKG: Sinus rhythm with 1st degree AV block. Ventricular rate of 63. T wave inversion in lead III. No STEMI. No significant change from previous.        Imaging Results          X-Ray Chest PA And Lateral (Final result)  Result time 03/17/19 18:46:24    Final result by Job Johnson MD (03/17/19 18:46:24)                 Impression:      Findings suggesting sequela of pulmonary edema/CHF pattern, without focal consolidation.  Interstitial pneumonia not excluded.      Electronically signed by: Job Johnson MD  Date:    03/17/2019  Time:    18:46             Narrative:    EXAMINATION:  XR CHEST PA AND LATERAL    CLINICAL HISTORY:  Dyspnea, unspecified    TECHNIQUE:  PA and lateral views of the chest were performed.    COMPARISON:  Chest radiograph 03/17/2019    FINDINGS:  Resolution is limited by body habitus with underpenetration.    Cardiomediastinal silhouette is midline and prominent similar to prior.  There is mild prominence of the central pulmonary vasculature with mild diffuse interstitial coarsening.  Bibasilar scattered linear opacities consistent with subsegmental scarring versus atelectasis and chronic right middle lobe platelike scarring versus atelectasis.  The lungs are otherwise well expanded without focal consolidation, pleural effusion or pneumothorax.  Osseous structures show degenerative change without acute process seen.                               X-Ray Chest AP Portable (Final result)     Abnormal  Result time 03/17/19 17:04:13    Final result by Job Johnson MD (03/17/19 17:04:13)                 Impression:      Left basilar subtle opacities which could represent artifact versus airspace disease including  aspiration or pneumonia.  Short-term follow-up chest radiography after therapy is recommended to ensure resolution.    This report was flagged in Epic as abnormal.      Electronically signed by: Job Johnson MD  Date:    03/17/2019  Time:    17:04             Narrative:    EXAMINATION:  XR CHEST AP PORTABLE    CLINICAL HISTORY:  shortness of breath;    TECHNIQUE:  Single frontal view of the chest was performed.    COMPARISON:  Chest radiograph 04/24/2016    FINDINGS:  Monitoring leads overlie the chest.  Large body habitus.  No detrimental change.    Cardiomediastinal silhouette is upper limits of normal in size without evidence of failure, which may be magnified by AP portable technique and chest wall.    Pulmonary vasculature and hilar regions are within normal limits.  Few scattered linear opacities at the lung bases suggesting subsegmental scarring versus atelectasis.  Subtle opacities at the left lung base.  No large pleural effusion or pneumothorax.  No acute osseous process seen.  PA and lateral views can be obtained.                                 Medical Decision Making:   Initial Assessment:   63 y,o female reports chest pain, dizziness, hypertension and shortness of breath.Today she denies chest pain on my evaluation. She does complain of shortness of breath.  Differential Diagnosis:   Differential Diagnosis includes, but is not limited to:  PE, MI/ACS, pneumothorax, pericardial effusion/tamonade, pneumonia, lung abscess, pericarditis/myocarditis, pleural effusion, lung mass, CHF exacerbation, asthma exacerbation, COPD exacerbation, aspirated/ingested foreign body, airway obstruction, CO poisoning, anemia, metabolic derangement, allergy/atopy, influenza, viral URI, viral syndrome.    Independently Interpreted Test(s):   I have ordered and independently interpreted EKG Reading(s) - see prior notes  Clinical Tests:   Lab Tests: Ordered and Reviewed  Radiological Study: Ordered and Reviewed  Medical Tests:  Ordered and Reviewed  ED Management:  7:20 PM On re-evaluation the patient reports chest pain. Will obtain EKG and reassess    7:26 PM Case discussed with Dr. Harry, agrees with admission to the CDU    Additional MDM:   Heart Score:    History:          Slightly suspicious.  ECG:             Nonspecific repolarisation disturbance  Age:               45-65 years  Risk factors: >= 3 risk factors or history of atherosclerotic disease  Troponin:       Less than or equal to normal limit  Final Score: 4                    ED Course as of Mar 17 1928   Sun Mar 17, 2019   1926 Potassium: 3.8 [RN]      ED Course User Index  [RN] Vipul Blake Jr., MD     Clinical Impression:     1. Shortness of breath    2. Dyspnea    3. Chest pain        Disposition:   Disposition: Placed in Observation  Condition: Fair    Scribe attestation I, Vipul Blake,  personally performed the services described in this documentation. All medical record entries made by the scribe were at my direction and in my presence.  I have reviewed the chart and agree that the record reflects my personal performance and is accurate and complete. Vipul Blake M.D. 1:11 PM03/20/2019                       Vipul Blake Jr., MD  03/20/19 3735

## 2019-03-18 VITALS
HEIGHT: 59 IN | OXYGEN SATURATION: 97 % | RESPIRATION RATE: 18 BRPM | TEMPERATURE: 99 F | BODY MASS INDEX: 45.16 KG/M2 | WEIGHT: 224 LBS | SYSTOLIC BLOOD PRESSURE: 159 MMHG | HEART RATE: 78 BPM | DIASTOLIC BLOOD PRESSURE: 73 MMHG

## 2019-03-18 PROBLEM — R07.9 CHEST PAIN: Status: RESOLVED | Noted: 2019-03-17 | Resolved: 2019-03-18

## 2019-03-18 PROBLEM — R06.02 SOB (SHORTNESS OF BREATH): Status: RESOLVED | Noted: 2019-03-17 | Resolved: 2019-03-18

## 2019-03-18 PROBLEM — I51.89 GRADE I DIASTOLIC DYSFUNCTION: Status: ACTIVE | Noted: 2019-03-18

## 2019-03-18 LAB
ALBUMIN SERPL BCP-MCNC: 3.8 G/DL
ALP SERPL-CCNC: 70 U/L
ALT SERPL W/O P-5'-P-CCNC: 35 U/L
ANION GAP SERPL CALC-SCNC: 9 MMOL/L
ANION GAP SERPL CALC-SCNC: 9 MMOL/L
AORTIC ROOT ANNULUS: 2.5 CM
AST SERPL-CCNC: 20 U/L
AV INDEX (PROSTH): 0.69
AV MEAN GRADIENT: 5.8 MMHG
AV PEAK GRADIENT: 8.18 MMHG
AV VALVE AREA: 2.15 CM2
AV VELOCITY RATIO: 0.65
BASOPHILS # BLD AUTO: 0.02 K/UL
BASOPHILS NFR BLD: 0.4 %
BILIRUB SERPL-MCNC: 0.5 MG/DL
BSA FOR ECHO PROCEDURE: 2.06 M2
BUN SERPL-MCNC: 12 MG/DL
BUN SERPL-MCNC: 12 MG/DL
CALCIUM SERPL-MCNC: 9.8 MG/DL
CALCIUM SERPL-MCNC: 9.8 MG/DL
CHLORIDE SERPL-SCNC: 106 MMOL/L
CHLORIDE SERPL-SCNC: 106 MMOL/L
CO2 SERPL-SCNC: 26 MMOL/L
CO2 SERPL-SCNC: 26 MMOL/L
CREAT SERPL-MCNC: 0.8 MG/DL
CREAT SERPL-MCNC: 0.8 MG/DL
CV ECHO LV RWT: 0.5 CM
DIFFERENTIAL METHOD: ABNORMAL
DOP CALC AO PEAK VEL: 1.43 M/S
DOP CALC AO VTI: 30.62 CM
DOP CALC LVOT AREA: 3.11 CM2
DOP CALC LVOT DIAMETER: 1.99 CM
DOP CALC LVOT PEAK VEL: 0.93 M/S
DOP CALC LVOT STROKE VOLUME: 65.81 CM3
DOP CALCLVOT PEAK VEL VTI: 21.17 CM
E WAVE DECELERATION TIME: 268.56 MSEC
E/A RATIO: 1.14
ECHO LV POSTERIOR WALL: 1 CM (ref 0.6–1.1)
EOSINOPHIL # BLD AUTO: 0.1 K/UL
EOSINOPHIL NFR BLD: 2.4 %
ERYTHROCYTE [DISTWIDTH] IN BLOOD BY AUTOMATED COUNT: 14.7 %
EST. GFR  (AFRICAN AMERICAN): >60 ML/MIN/1.73 M^2
EST. GFR  (AFRICAN AMERICAN): >60 ML/MIN/1.73 M^2
EST. GFR  (NON AFRICAN AMERICAN): >60 ML/MIN/1.73 M^2
EST. GFR  (NON AFRICAN AMERICAN): >60 ML/MIN/1.73 M^2
ESTIMATED AVG GLUCOSE: 134 MG/DL
ESTIMATED AVG GLUCOSE: 134 MG/DL
FRACTIONAL SHORTENING: 23 % (ref 28–44)
GLUCOSE SERPL-MCNC: 111 MG/DL
GLUCOSE SERPL-MCNC: 111 MG/DL
HBA1C MFR BLD HPLC: 6.3 %
HBA1C MFR BLD HPLC: 6.3 %
HCT VFR BLD AUTO: 37.6 %
HGB BLD-MCNC: 11.8 G/DL
INTERVENTRICULAR SEPTUM: 0.99 CM (ref 0.6–1.1)
IVRT: 0.12 MSEC
LA MAJOR: 4.91 CM
LA MINOR: 5.03 CM
LA WIDTH: 4.05 CM
LEFT ATRIUM SIZE: 3.65 CM
LEFT ATRIUM VOLUME INDEX: 32.3 ML/M2
LEFT ATRIUM VOLUME: 62.44 CM3
LEFT INTERNAL DIMENSION IN SYSTOLE: 3.07 CM (ref 2.1–4)
LEFT VENTRICLE DIASTOLIC VOLUME INDEX: 35.95 ML/M2
LEFT VENTRICLE DIASTOLIC VOLUME: 69.56 ML
LEFT VENTRICLE MASS INDEX: 64.9 G/M2
LEFT VENTRICLE SYSTOLIC VOLUME INDEX: 19.2 ML/M2
LEFT VENTRICLE SYSTOLIC VOLUME: 37.06 ML
LEFT VENTRICULAR INTERNAL DIMENSION IN DIASTOLE: 3.99 CM (ref 3.5–6)
LEFT VENTRICULAR MASS: 125.67 G
LV LATERAL E/E' RATIO: 17.6
LYMPHOCYTES # BLD AUTO: 3 K/UL
LYMPHOCYTES NFR BLD: 55.3 %
MCH RBC QN AUTO: 26.8 PG
MCHC RBC AUTO-ENTMCNC: 31.4 G/DL
MCV RBC AUTO: 86 FL
MONOCYTES # BLD AUTO: 0.3 K/UL
MONOCYTES NFR BLD: 5.9 %
MV PEAK A VEL: 0.77 M/S
MV PEAK E VEL: 0.88 M/S
NEUTROPHILS # BLD AUTO: 1.9 K/UL
NEUTROPHILS NFR BLD: 35.8 %
PISA TR MAX VEL: 2.52 M/S
PLATELET # BLD AUTO: 273 K/UL
PMV BLD AUTO: 10.5 FL
POCT GLUCOSE: 141 MG/DL (ref 70–110)
POCT GLUCOSE: 87 MG/DL (ref 70–110)
POCT GLUCOSE: 99 MG/DL (ref 70–110)
POTASSIUM SERPL-SCNC: 3.8 MMOL/L
POTASSIUM SERPL-SCNC: 3.8 MMOL/L
PROT SERPL-MCNC: 7.1 G/DL
PULM VEIN S/D RATIO: 1.03
PV PEAK D VEL: 0.3 M/S
PV PEAK S VEL: 0.31 M/S
RA MAJOR: 4.6 CM
RA PRESSURE: 3 MMHG
RBC # BLD AUTO: 4.4 M/UL
RIGHT VENTRICULAR END-DIASTOLIC DIMENSION: 2.72 CM
SODIUM SERPL-SCNC: 141 MMOL/L
SODIUM SERPL-SCNC: 141 MMOL/L
TDI LATERAL: 0.05
TR MAX PG: 25.4 MMHG
TROPONIN I SERPL DL<=0.01 NG/ML-MCNC: <0.006 NG/ML
TV REST PULMONARY ARTERY PRESSURE: 28 MMHG
WBC # BLD AUTO: 5.41 K/UL

## 2019-03-18 PROCEDURE — 94761 N-INVAS EAR/PLS OXIMETRY MLT: CPT

## 2019-03-18 PROCEDURE — 94799 UNLISTED PULMONARY SVC/PX: CPT

## 2019-03-18 PROCEDURE — 36415 COLL VENOUS BLD VENIPUNCTURE: CPT

## 2019-03-18 PROCEDURE — G0378 HOSPITAL OBSERVATION PER HR: HCPCS

## 2019-03-18 PROCEDURE — 25000003 PHARM REV CODE 250: Performed by: PHYSICIAN ASSISTANT

## 2019-03-18 PROCEDURE — A4216 STERILE WATER/SALINE, 10 ML: HCPCS | Performed by: FAMILY MEDICINE

## 2019-03-18 PROCEDURE — 84484 ASSAY OF TROPONIN QUANT: CPT

## 2019-03-18 PROCEDURE — 85025 COMPLETE CBC W/AUTO DIFF WBC: CPT

## 2019-03-18 PROCEDURE — 80053 COMPREHEN METABOLIC PANEL: CPT

## 2019-03-18 PROCEDURE — 25000003 PHARM REV CODE 250: Performed by: FAMILY MEDICINE

## 2019-03-18 RX ORDER — LOSARTAN POTASSIUM 50 MG/1
100 TABLET ORAL DAILY
Status: DISCONTINUED | OUTPATIENT
Start: 2019-03-18 | End: 2019-03-18 | Stop reason: HOSPADM

## 2019-03-18 RX ORDER — LOSARTAN POTASSIUM 100 MG/1
100 TABLET ORAL DAILY
COMMUNITY

## 2019-03-18 RX ORDER — NITROGLYCERIN 0.4 MG/1
0.4 TABLET SUBLINGUAL EVERY 5 MIN PRN
Status: DISCONTINUED | OUTPATIENT
Start: 2019-03-18 | End: 2019-03-18 | Stop reason: HOSPADM

## 2019-03-18 RX ORDER — METOPROLOL SUCCINATE 50 MG/1
100 TABLET, EXTENDED RELEASE ORAL DAILY
Qty: 30 TABLET | Refills: 3 | Status: SHIPPED | OUTPATIENT
Start: 2019-03-18 | End: 2020-09-24

## 2019-03-18 RX ADMIN — GABAPENTIN 300 MG: 300 CAPSULE ORAL at 04:03

## 2019-03-18 RX ADMIN — Medication 3 ML: at 01:03

## 2019-03-18 RX ADMIN — ATORVASTATIN CALCIUM 10 MG: 10 TABLET, FILM COATED ORAL at 08:03

## 2019-03-18 RX ADMIN — GABAPENTIN 300 MG: 300 CAPSULE ORAL at 08:03

## 2019-03-18 RX ADMIN — Medication 3 ML: at 06:03

## 2019-03-18 RX ADMIN — ASPIRIN 81 MG 81 MG: 81 TABLET ORAL at 08:03

## 2019-03-18 RX ADMIN — METOPROLOL SUCCINATE 50 MG: 50 TABLET, EXTENDED RELEASE ORAL at 08:03

## 2019-03-18 RX ADMIN — LOSARTAN POTASSIUM 100 MG: 50 TABLET, FILM COATED ORAL at 01:03

## 2019-03-18 NOTE — PLAN OF CARE
Problem: Adult Inpatient Plan of Care  Goal: Plan of Care Review  Outcome: Ongoing (interventions implemented as appropriate)  Patient on RA with sats as documented.  IS done

## 2019-03-18 NOTE — HOSPITAL COURSE
Patient was admitted for observation to the CDU. Troponin was trended and remained negative. Echo showed EF of 65%, grade 1 DD, no pulmonary HTN. Telemetry monitoring showed NSR, no other significant arrhythmias or ectopy. Her home metoprolol was increased to 100 mg PO daily for tighter blood pressure control. Patient remained hemodynamically stable and on room air without complaint of chest pain or shortness of breath. Recommended incentive spirometry and deep breathing exercises for atelectasis seen on CXR. She was discharged to home in good condition with referral to Cardiology placed for possible outpatient stress test.

## 2019-03-18 NOTE — ED NOTES
Introduced self to patient, patient is a&ox3. She c/o chest pain at this time 8/10, Dr. Blake is at bedside and is aware, verbally ordered repeat EKG. Dr. Blake also aware of patient's elevated BP. Call light is within reach.

## 2019-03-18 NOTE — H&P
[facility] Hospital Medicine  History & Physical    Patient Name: Christie Damian  MRN: [id number]  Admission Date: 3/17/2019  Attending Physician: Dr Geoffrey Sutton  Primary Care Provider: Jonelle Elena Jr, MD         Patient information was obtained from patient and ER records.     Subjective:     Principal Problem:Chest pain    Chief Complaint:   Chief Complaint   Patient presents with    Hypertension     took home b/p twice, with reading over 200, pt also complains of cheast pain x 2 days,  and intermitent HA x 1 week         HPI: 63 yr old pleasant black female with controlled DM II, HTN, HLD, presents to emergency with SOB, chest pain and elevated BP. It started 2-3 weeks ago and gradually worsening since past 2-3 days. This afternoon, when she was at [facility], she checked her BP due to headache and found to be 228/108 and asked to be rechecked at pharmacy. She then called her friend and came to emergency. She takes aspirin every day and it did not help the pain. She also felt SOB during this 3 weeks. She was given NTG patch at ER and her chest pain subsided. She had T wave inversion in inferior lead and normal troponin. She is admitted for serial troponin and rule out ACS.     Past Medical History:   Diagnosis Date    Hyperlipidemia     Hypertension     Pre-diabetes     Sciatica        Past Surgical History:   Procedure Laterality Date    HYSTERECTOMY      LEG SURGERY      TONSILLECTOMY         Review of patient's allergies indicates:   Allergen Reactions    Codeine Nausea And Vomiting    Lisinopril        No current facility-administered medications on file prior to encounter.      Current Outpatient Medications on File Prior to Encounter   Medication Sig    amitriptyline (ELAVIL) 10 MG tablet Take 10 mg by mouth nightly as needed for Insomnia.    amlodipine (NORVASC) 5 MG tablet Take 10 mg by mouth once daily.     aspirin 81 MG Chew Take 81 mg by mouth once daily.     gabapentin (NEURONTIN) 300 MG capsule Take 300 mg by mouth 3 (three) times daily.    lovastatin (ALTOPREV) 10 mg 24 hr tablet Take 10 mg by mouth every evening.    meloxicam (MOBIC) 7.5 MG tablet Take 7.5 mg by mouth once daily.    metFORMIN (GLUCOPHAGE) 500 MG tablet Take 500 mg by mouth 2 (two) times daily with meals.    metoprolol succinate (TOPROL-XL) 50 MG 24 hr tablet Take 50 mg by mouth once daily.    oxyCODONE-acetaminophen (PERCOCET) 5-325 mg per tablet Take 1 tablet by mouth every 4 (four) hours as needed for Pain.    triamcinolone acetonide 0.1% (KENALOG) 0.1 % cream Apply topically 2 (two) times daily. for 10 days     Family History     None        Tobacco Use    Smoking status: Never Smoker    Smokeless tobacco: Never Used   Substance and Sexual Activity    Alcohol use: No    Drug use: Not on file    Sexual activity: Not on file     Review of Systems   Constitutional: Negative.  Negative for activity change, diaphoresis and unexpected weight change.   HENT: Negative.  Negative for congestion, ear discharge, hearing loss, rhinorrhea, sore throat and voice change.    Eyes: Negative.  Negative for pain, discharge and visual disturbance.   Respiratory: Positive for chest tightness and shortness of breath. Negative for wheezing.    Cardiovascular: Positive for chest pain.   Gastrointestinal: Positive for nausea. Negative for abdominal distention, anal bleeding and constipation.   Endocrine: Negative.  Negative for cold intolerance, polydipsia and polyuria.   Genitourinary: Negative.  Negative for decreased urine volume, difficulty urinating, dysuria, frequency, menstrual problem and vaginal pain.   Musculoskeletal: Negative.  Negative for arthralgias, gait problem and myalgias.   Skin: Negative.  Negative for color change, pallor and wound.   Allergic/Immunologic: Negative.  Negative for environmental allergies and immunocompromised state.   Neurological: Positive for headaches. Negative for  dizziness, tremors, seizures and speech difficulty.   Hematological: Negative.  Negative for adenopathy. Does not bruise/bleed easily.   Psychiatric/Behavioral: Negative.  Negative for agitation, confusion, decreased concentration, hallucinations, self-injury and suicidal ideas. The patient is not nervous/anxious.      Objective:     Vital Signs (Most Recent):  Temp: 98.2 °F (36.8 °C) (03/17/19 1959)  Pulse: 72 (03/17/19 2217)  Resp: (!) 22 (03/17/19 2217)  BP: (!) 174/79 (03/17/19 2217)  SpO2: 98 % (03/17/19 2147) Vital Signs (24h Range):  Temp:  [97.8 °F (36.6 °C)-98.2 °F (36.8 °C)] 98.2 °F (36.8 °C)  Pulse:  [62-75] 72  Resp:  [16-32] 22  SpO2:  [96 %-100 %] 98 %  BP: (171-231)/() 174/79     Weight: 101.6 kg (224 lb)  Body mass index is 45.24 kg/m².    Physical Exam   Constitutional: She is oriented to person, place, and time. She appears well-developed and well-nourished. No distress.   HENT:   Head: Normocephalic and atraumatic.   Right Ear: External ear normal.   Left Ear: External ear normal.   Nose: Nose normal.   Mouth/Throat: Oropharynx is clear and moist. No oropharyngeal exudate.   Eyes: Conjunctivae and EOM are normal. Pupils are equal, round, and reactive to light. Right eye exhibits no discharge. Left eye exhibits no discharge. No scleral icterus.   Neck: Normal range of motion. Neck supple. No JVD present. No tracheal deviation present. No thyromegaly present.   Cardiovascular: Normal rate, regular rhythm, normal heart sounds and intact distal pulses. Exam reveals no gallop and no friction rub.   No murmur heard.  Pulmonary/Chest: Effort normal and breath sounds normal. No stridor. She has no wheezes. She has no rales. She exhibits no tenderness.   Abdominal: Soft. Bowel sounds are normal. She exhibits no distension and no mass. There is no tenderness. There is no rebound and no guarding. No hernia.   Musculoskeletal: Normal range of motion. She exhibits no edema or tenderness.    Lymphadenopathy:     She has no cervical adenopathy.   Neurological: She is alert and oriented to person, place, and time. She has normal reflexes. She displays normal reflexes. No cranial nerve deficit. She exhibits normal muscle tone. Coordination normal.   Skin: Skin is warm and dry. No rash noted. She is not diaphoretic. No erythema. No pallor.   Psychiatric: She has a normal mood and affect. Her behavior is normal. Judgment and thought content normal.         CRANIAL NERVES     CN III, IV, VI   Pupils are equal, round, and reactive to light.  Extraocular motions are normal.       Significant Labs:  Admission on 03/17/2019   Component Date Value Ref Range Status    D-Dimer 03/17/2019 0.21  <0.50 mg/L FEU Final    Comment: The quantitative D-dimer assay should be used as an aid in   the diagnosis of deep vein thrombosis and pulmonary embolism  in patients with the appropriate presentation and clinical  history. The upper limit of the reference interval and the clinical   cut off   point are identical. Causes of a positive (>0.50 mg/L FEU) D-Dimer   test  include, but are not limited to: DVT, PE, DIC, thrombolytic   therapy, anticoagulant therapy, recent surgery, trauma, or   pregnancy, disseminated malignancy, aortic aneurysm, cirrhosis,  and severe infection. False negative results may occur in   patients with distal DVT.      Sodium 03/17/2019 140  136 - 145 mmol/L Final    Potassium 03/17/2019 3.8  3.5 - 5.1 mmol/L Final    Chloride 03/17/2019 104  95 - 110 mmol/L Final    CO2 03/17/2019 27  23 - 29 mmol/L Final    Glucose 03/17/2019 111* 70 - 110 mg/dL Final    BUN, Bld 03/17/2019 16  8 - 23 mg/dL Final    Creatinine 03/17/2019 0.9  0.5 - 1.4 mg/dL Final    Calcium 03/17/2019 10.2  8.7 - 10.5 mg/dL Final    Total Protein 03/17/2019 7.4  6.0 - 8.4 g/dL Final    Albumin 03/17/2019 4.2  3.5 - 5.2 g/dL Final    Total Bilirubin 03/17/2019 0.2  0.1 - 1.0 mg/dL Final    Comment: For infants and  newborns, interpretation of results should be based  on gestational age, weight and in agreement with clinical  observations.  Premature Infant recommended reference ranges:  Up to 24 hours.............<8.0 mg/dL  Up to 48 hours............<12.0 mg/dL  3-5 days..................<15.0 mg/dL  6-29 days.................<15.0 mg/dL      Alkaline Phosphatase 03/17/2019 75  55 - 135 U/L Final    AST 03/17/2019 26  10 - 40 U/L Final    ALT 03/17/2019 38  10 - 44 U/L Final    Anion Gap 03/17/2019 9  8 - 16 mmol/L Final    eGFR if African American 03/17/2019 >60  >60 mL/min/1.73 m^2 Final    eGFR if non African American 03/17/2019 >60  >60 mL/min/1.73 m^2 Final    Comment: Calculation used to obtain the estimated glomerular filtration  rate (eGFR) is the CKD-EPI equation.       WBC 03/17/2019 6.82  3.90 - 12.70 K/uL Final    RBC 03/17/2019 4.23  4.00 - 5.40 M/uL Final    Hemoglobin 03/17/2019 11.4* 12.0 - 16.0 g/dL Final    Hematocrit 03/17/2019 36.8* 37.0 - 48.5 % Final    MCV 03/17/2019 87  82 - 98 fL Final    MCH 03/17/2019 27.0  27.0 - 31.0 pg Final    MCHC 03/17/2019 31.0* 32.0 - 36.0 g/dL Final    RDW 03/17/2019 14.7* 11.5 - 14.5 % Final    Platelets 03/17/2019 281  150 - 350 K/uL Final    MPV 03/17/2019 10.3  9.2 - 12.9 fL Final    Gran # (ANC) 03/17/2019 1.8  1.8 - 7.7 K/uL Final    Lymph # 03/17/2019 4.4  1.0 - 4.8 K/uL Final    Mono # 03/17/2019 0.5  0.3 - 1.0 K/uL Final    Eos # 03/17/2019 0.1  0.0 - 0.5 K/uL Final    Baso # 03/17/2019 0.02  0.00 - 0.20 K/uL Final    Gran% 03/17/2019 25.9* 38.0 - 73.0 % Final    Lymph% 03/17/2019 64.4* 18.0 - 48.0 % Final    Mono% 03/17/2019 7.2  4.0 - 15.0 % Final    Eosinophil% 03/17/2019 2.1  0.0 - 8.0 % Final    Basophil% 03/17/2019 0.3  0.0 - 1.9 % Final    Differential Method 03/17/2019 Automated   Final    BNP 03/17/2019 29  0 - 99 pg/mL Final    Values of less than 100 pg/ml are consistent with non-CHF populations.    Troponin I 03/17/2019  <0.006  0.000 - 0.026 ng/mL Final    Comment: The reference interval for Troponin I represents the 99th percentile   cutoff   for our facility and is consistent with 3rd generation assay   performance.           Significant Imaging: I have reviewed and interpreted all pertinent imaging results/findings within the past 24 hours.   Imaging Results          X-Ray Chest PA And Lateral (Final result)  Result time 03/17/19 18:46:24    Final result by Job Johnson MD (03/17/19 18:46:24)                 Impression:      Findings suggesting sequela of pulmonary edema/CHF pattern, without focal consolidation.  Interstitial pneumonia not excluded.      Electronically signed by: Job Johnson MD  Date:    03/17/2019  Time:    18:46             Narrative:    EXAMINATION:  XR CHEST PA AND LATERAL    CLINICAL HISTORY:  Dyspnea, unspecified    TECHNIQUE:  PA and lateral views of the chest were performed.    COMPARISON:  Chest radiograph 03/17/2019    FINDINGS:  Resolution is limited by body habitus with underpenetration.    Cardiomediastinal silhouette is midline and prominent similar to prior.  There is mild prominence of the central pulmonary vasculature with mild diffuse interstitial coarsening.  Bibasilar scattered linear opacities consistent with subsegmental scarring versus atelectasis and chronic right middle lobe platelike scarring versus atelectasis.  The lungs are otherwise well expanded without focal consolidation, pleural effusion or pneumothorax.  Osseous structures show degenerative change without acute process seen.                               X-Ray Chest AP Portable (Final result)     Abnormal  Result time 03/17/19 17:04:13    Final result by Job Johnson MD (03/17/19 17:04:13)                 Impression:      Left basilar subtle opacities which could represent artifact versus airspace disease including aspiration or pneumonia.  Short-term follow-up chest radiography after therapy is recommended to ensure  resolution.    This report was flagged in Epic as abnormal.      Electronically signed by: Job Johnson MD  Date:    03/17/2019  Time:    17:04             Narrative:    EXAMINATION:  XR CHEST AP PORTABLE    CLINICAL HISTORY:  shortness of breath;    TECHNIQUE:  Single frontal view of the chest was performed.    COMPARISON:  Chest radiograph 04/24/2016    FINDINGS:  Monitoring leads overlie the chest.  Large body habitus.  No detrimental change.    Cardiomediastinal silhouette is upper limits of normal in size without evidence of failure, which may be magnified by AP portable technique and chest wall.    Pulmonary vasculature and hilar regions are within normal limits.  Few scattered linear opacities at the lung bases suggesting subsegmental scarring versus atelectasis.  Subtle opacities at the left lung base.  No large pleural effusion or pneumothorax.  No acute osseous process seen.  PA and lateral views can be obtained.                                  Assessment/Plan:     Active Diagnoses:    Diagnosis Date Noted POA    PRINCIPAL PROBLEM:  Chest pain [R07.9] 03/17/2019 Yes    Hyperlipidemia [E78.5] 03/17/2019 Yes    Hypertension [I10] 03/17/2019 Yes    Pre-diabetes [R73.03] 03/17/2019 Yes    SOB (shortness of breath) [R06.02] 03/17/2019 Yes    DM II (diabetes mellitus, type II), controlled [E11.9] 03/17/2019 Yes      Problems Resolved During this Admission:     VTE Risk Mitigation (From admission, onward)        Ordered     Place SWAPNIL hose  Until discontinued      03/17/19 2226     Place sequential compression device  Until discontinued      03/17/19 2226     IP VTE HIGH RISK PATIENT  Once      03/17/19 2226        Admit to Hospital Medicine    1. Chest pain/SOB  -r/o ACS  -NTG patch  -aspirin daily  -serial troponin  -recommend outpatient stress test    2. DM II  -ISS  -controlled    3. HTN  -uncontrolled  -titrate meds and hydralazine prn    4. HLD  -continue statin    5. Pro[hylaxis  -SWAPNLI, PPI    Spent  adequate time in obtaining history and explaining differentials        Anjel Harry MD  Department of Hospital Medicine   [facility]

## 2019-03-18 NOTE — DISCHARGE SUMMARY
[facility] Medicine  Discharge Summary      Patient Name: Christie Damian  MRN: [id number]  Admission Date: 3/17/2019  Hospital Length of Stay: 0 days  Discharge Date and Time: 3/18/2019  5:10 PM  Attending Physician: Geoffrey Marroquin MD   Discharging Provider: Vika Katz PA-C  Primary Care Provider: Jonelle Elena Jr, MD      HPI:   63 yr old pleasant black female with controlled DM II, HTN, HLD, presents to emergency with SOB, chest pain and elevated BP. It started 2-3 weeks ago and gradually worsening since past 2-3 days. This afternoon, when she was at [facility], she checked her BP due to headache and found to be 228/108 and asked to be rechecked at pharmacy. She then called her friend and came to emergency. She takes aspirin every day and it did not help the pain. She also felt SOB during this 3 weeks. She was given NTG patch at ER and her chest pain subsided. She had T wave inversion in inferior lead and normal troponin. She is admitted for serial troponin and rule out ACS.    * No surgery found *      Hospital Course:   Patient was admitted for observation to the CDU. Troponin was trended and remained negative. Echo showed EF of 65%, grade 1 DD, no pulmonary HTN. Telemetry monitoring showed NSR, no other significant arrhythmias or ectopy. Her home metoprolol was increased to 100 mg PO daily for tighter blood pressure control. Patient remained hemodynamically stable and on room air without complaint of chest pain or shortness of breath. Recommended incentive spirometry and deep breathing exercises for atelectasis seen on CXR. She was discharged to home in good condition with referral to Cardiology placed for possible outpatient stress test.     Consults:     No new Assessment & Plan notes have been filed under this hospital service since the last note was generated.  Service: Hospital Medicine    Final Active Diagnoses:    Diagnosis Date Noted POA    Grade I diastolic  dysfunction [I51.9] 03/18/2019 Yes    Hyperlipidemia [E78.5] 03/17/2019 Yes    Hypertension [I10] 03/17/2019 Yes    Pre-diabetes [R73.03] 03/17/2019 Yes    DM II (diabetes mellitus, type II), controlled [E11.9] 03/17/2019 Yes      Problems Resolved During this Admission:    Diagnosis Date Noted Date Resolved POA    PRINCIPAL PROBLEM:  Chest pain [R07.9] 03/17/2019 03/18/2019 Yes    SOB (shortness of breath) [R06.02] 03/17/2019 03/18/2019 Yes       Discharged Condition: stable    Disposition: Home or Self Care    Follow Up:  Follow-up Information     Jonelle Elena Jr, MD On 3/26/2019.    Specialty:  Family Medicine  Why:  9:30 For Hospital Follow-up  Contact information:  [address] [facility] [place] [address]  [phone]                 Patient Instructions:      Ambulatory referral to Pulmonology   Referral Priority: Routine Referral Type: Consultation   Referral Reason: Specialty Services Required   Requested Specialty: Pulmonary Disease   Number of Visits Requested: 1     Ambulatory referral to Cardiology   Referral Priority: Routine Referral Type: Consultation   Referral Reason: Specialty Services Required   Requested Specialty: Cardiology   Number of Visits Requested: 1     Diet diabetic     Diet Cardiac     Notify your health care provider if you experience any of the following:  difficulty breathing or increased cough     Notify your health care provider if you experience any of the following:  severe uncontrolled pain     Activity as tolerated       Significant Diagnostic Studies: Labs: All labs within the past 24 hours have been reviewed    Pending Diagnostic Studies:     None         Medications:  Reconciled Home Medications:      Medication List      CHANGE how you take these medications    metoprolol succinate 50 MG 24 hr tablet  Commonly known as:  TOPROL-XL  Take 2 tablets (100 mg total) by mouth once daily.  What changed:  how much to take        CONTINUE taking these medications    amitriptyline  10 MG tablet  Commonly known as:  ELAVIL  Take 10 mg by mouth nightly as needed for Insomnia.     aspirin 81 MG Chew  Take 81 mg by mouth once daily.     gabapentin 300 MG capsule  Commonly known as:  NEURONTIN  Take 300 mg by mouth 3 (three) times daily.     losartan 100 MG tablet  Commonly known as:  COZAAR  Take 100 mg by mouth once daily.     lovastatin 10 mg 24 hr tablet  Commonly known as:  ALTOPREV  Take 10 mg by mouth every evening.     meloxicam 7.5 MG tablet  Commonly known as:  MOBIC  Take 7.5 mg by mouth once daily.     metFORMIN 500 MG tablet  Commonly known as:  GLUCOPHAGE  Take 500 mg by mouth 2 (two) times daily with meals.     oxyCODONE-acetaminophen 5-325 mg per tablet  Commonly known as:  PERCOCET  Take 1 tablet by mouth every 4 (four) hours as needed for Pain.     triamcinolone acetonide 0.1% 0.1 % cream  Commonly known as:  KENALOG  Apply topically 2 (two) times daily. for 10 days            Indwelling Lines/Drains at time of discharge:   Lines/Drains/Airways          None          Time spent on the discharge of patient: 35 minutes  Patient was seen and examined on the date of discharge and determined to be suitable for discharge.      GT Goldman MD  [facility]  MD Toni Mcclellan MD Ijeoma Innocent-Ituah, MD Fadi Hawawini, MD Elizabeth Knipp, PA-C Brittany Chatman, NP Kristin Stein, PA-C Arekeva Selmon, NP-C  [address] [place] [id number]  Office Phone: [phone]  Office Fax: [phone]

## 2019-03-18 NOTE — PROGRESS NOTES
"Patient arrived to unit from ER via stretcher.   Patient in room 479 A.  Transferred into bed with standby assist.   Telephone report given by Graciela RN.  Charge nurse advised of patient arrival.   VS currently stable.   Tele monitor applied.   Patient oriented to room, rounding sheet and call bell.   Bed in lowest position, call light in reach.  Encouraged to notify of all needs.   Will continue to monitor.  BP (!) 182/82   Pulse 79   Temp 98 °F (36.7 °C) (Oral)   Resp 20   Ht 4' 11" (1.499 m)   Wt 101.6 kg (224 lb)   SpO2 98%   Breastfeeding? No   BMI 45.24 kg/m²     "

## 2019-03-18 NOTE — PLAN OF CARE
03/18/19 1548   Final Note   Assessment Type Final Discharge Note   Anticipated Discharge Disposition Home   What phone number can be called within the next 1-3 days to see how you are doing after discharge? ([phone])   Hospital Follow Up  Appt(s) scheduled? Yes   Discharge plans and expectations educations in teach back method with documentation complete? Yes   Right Care Referral Info   Post Acute Recommendation No Care       D/C rounds complete. All questions answered.  Nurse to discuss d/c medications.  Discussed need to keep f/u appts, adherence to medication regimen for health maintenance, verbalized understanding.    Kyra Dong, RN    [phone]

## 2019-03-18 NOTE — ED NOTES
Spoke to Dr. Harry hospitalist over phone, verbally ordered hydralazine PRN q8hr for systolic of greater than 160

## 2019-03-18 NOTE — ED NOTES
Discussed with Dr. Blake about patient's elevated BP. Per Dr. Blake administer 10 mg hydralazine IVP if patient's systolic BP is 210 or above.

## 2019-03-18 NOTE — PLAN OF CARE
Pt AAO x 4.  VSS.  Pt remained afebrile throughout this shift.   Pt remained free of falls this shift.   Pt had no c/o pain this shift.  Blood glucose monitored.   Plan of care reviewed. Patient verbalizes understanding.   Pt moving/turing independently. Frequent weight shifting encouraged.  Patient SR on monitor.   Bed low, side rails up x 2, wheels locked, call light in reach.   Bed alarm maintained for safety.   Patient instructed to call for assistance.   Hourly rounding completed.   24 hour chart check completed.  Will continue to monitor.

## 2019-03-18 NOTE — PLAN OF CARE
Discharge planning brochure provided. White board updated with CM name & contact info.  Pt encouraged to call with any questions or needs. CM will continue to follow patient throughout the transitions of care, and assist with any discharge needs.       03/18/19 0942   Discharge Assessment   Assessment Type Discharge Planning Assessment   Confirmed/corrected address and phone number on facesheet? Yes   Assessment information obtained from? Patient   Communicated expected length of stay with patient/caregiver yes   Prior to hospitilization cognitive status: Alert/Oriented   Prior to hospitalization functional status: Independent   Current cognitive status: Alert/Oriented   Current Functional Status: Independent   Able to Return to Prior Arrangements yes   Is patient able to care for self after discharge? Yes   Readmission Within the Last 30 Days no previous admission in last 30 days   Patient currently being followed by outpatient case management? Yes   Patient currently receives any other outside agency services? No   Equipment Currently Used at Home none   Do you have any problems affording any of your prescribed medications? Yes   Is the patient taking medications as prescribed? yes   Does the patient have transportation home? Yes   Transportation Anticipated family or friend will provide   Does the patient receive services at the [facility]? No   Discharge Plan A Home   Discharge Plan B Home with family   DME Needed Upon Discharge  none   Patient/Family in Agreement with Plan yes       Kyra Dong RN    [phone]

## 2019-03-18 NOTE — HPI
63 yr old pleasant black female with controlled DM II, HTN, HLD, presents to emergency with SOB, chest pain and elevated BP. It started 2-3 weeks ago and gradually worsening since past 2-3 days. This afternoon, when she was at [facility], she checked her BP due to headache and found to be 228/108 and asked to be rechecked at pharmacy. She then called her friend and came to emergency. She takes aspirin every day and it did not help the pain. She also felt SOB during this 3 weeks. She was given NTG patch at ER and her chest pain subsided. She had T wave inversion in inferior lead and normal troponin. She is admitted for serial troponin and rule out ACS.

## 2019-03-18 NOTE — DISCHARGE INSTRUCTIONS
High Blood Pressure, Controlling (English) View Edit Remove   Atelectasis (English) View Edit Remove   Deep Breathing (English) View Edit Remove   High Blood Pressure, Established, Out of Control (English) View Edit Remove

## 2019-03-19 NOTE — PLAN OF CARE
Problem: Adult Inpatient Plan of Care  Goal: Plan of Care Review  Outcome: Outcome(s) achieved Date Met: 03/18/19  Patient safety maintained throughout this shift, troponin, and TTE negative, accu check ac & hs 141 no coverage needed, pt is being discharge to home in stable condition, pt verbalizes understanding of discharge instruction, follow up visits, and changes to medication, IV out tele box off, pt departed the unit in stable condition, via w/c escorted by nurse

## 2019-10-03 DIAGNOSIS — Z12.31 ENCOUNTER FOR SCREENING MAMMOGRAM FOR MALIGNANT NEOPLASM OF BREAST: Primary | ICD-10-CM

## 2019-11-20 ENCOUNTER — HOSPITAL ENCOUNTER (OUTPATIENT)
Dept: RADIOLOGY | Facility: HOSPITAL | Age: 64
Discharge: HOME | End: 2019-11-20
Attending: INTERNAL MEDICINE
Payer: COMMERCIAL

## 2019-11-20 DIAGNOSIS — Z12.31 ENCOUNTER FOR SCREENING MAMMOGRAM FOR MALIGNANT NEOPLASM OF BREAST: ICD-10-CM

## 2019-11-20 PROCEDURE — 77063 BREAST TOMOSYNTHESIS BI: CPT | Mod: 26,,, | Performed by: RADIOLOGY

## 2019-11-20 PROCEDURE — 77067 SCR MAMMO BI INCL CAD: CPT | Mod: TC

## 2019-11-20 PROCEDURE — 77067 SCR MAMMO BI INCL CAD: CPT | Mod: 26,,, | Performed by: RADIOLOGY

## 2019-11-20 PROCEDURE — 77063 MAMMO DIGITAL SCREENING BILAT WITH TOMOSYNTHESIS_CAD: ICD-10-PCS | Mod: 26,,, | Performed by: RADIOLOGY

## 2019-11-20 PROCEDURE — 77067 MAMMO DIGITAL SCREENING BILAT WITH TOMOSYNTHESIS_CAD: ICD-10-PCS | Mod: 26,,, | Performed by: RADIOLOGY

## 2019-12-09 DIAGNOSIS — R92.8 ABNORMAL MAMMOGRAM: Primary | ICD-10-CM

## 2019-12-17 ENCOUNTER — HOSPITAL ENCOUNTER (OUTPATIENT)
Dept: RADIOLOGY | Facility: HOSPITAL | Age: 64
Discharge: HOME | End: 2019-12-17
Attending: INTERNAL MEDICINE
Payer: COMMERCIAL

## 2019-12-17 DIAGNOSIS — R92.8 ABNORMAL MAMMOGRAM: ICD-10-CM

## 2019-12-17 PROCEDURE — 76642 ULTRASOUND BREAST LIMITED: CPT | Mod: TC,RT

## 2019-12-17 PROCEDURE — 77061 MAMMO DIGITAL DIAGNOSTIC RIGHT WITH TOMOSYNTHESIS_CAD: ICD-10-PCS | Mod: 26,,, | Performed by: RADIOLOGY

## 2019-12-17 PROCEDURE — 76642 US BREAST RIGHT LIMITED: ICD-10-PCS | Mod: 26,RT,, | Performed by: RADIOLOGY

## 2019-12-17 PROCEDURE — 77061 BREAST TOMOSYNTHESIS UNI: CPT | Mod: TC

## 2019-12-17 PROCEDURE — 77065 MAMMO DIGITAL DIAGNOSTIC RIGHT WITH TOMOSYNTHESIS_CAD: ICD-10-PCS | Mod: 26,,, | Performed by: RADIOLOGY

## 2019-12-17 PROCEDURE — 76642 ULTRASOUND BREAST LIMITED: CPT | Mod: 26,RT,, | Performed by: RADIOLOGY

## 2019-12-17 PROCEDURE — 77065 DX MAMMO INCL CAD UNI: CPT | Mod: TC

## 2019-12-17 PROCEDURE — 77061 BREAST TOMOSYNTHESIS UNI: CPT | Mod: 26,,, | Performed by: RADIOLOGY

## 2019-12-17 PROCEDURE — 77065 DX MAMMO INCL CAD UNI: CPT | Mod: 26,,, | Performed by: RADIOLOGY

## 2020-03-24 ENCOUNTER — HOSPITAL ENCOUNTER (EMERGENCY)
Facility: HOSPITAL | Age: 65
Discharge: HOME | End: 2020-03-25
Attending: EMERGENCY MEDICINE
Payer: COMMERCIAL

## 2020-03-24 DIAGNOSIS — R50.9 ACUTE FEBRILE ILLNESS: ICD-10-CM

## 2020-03-24 DIAGNOSIS — R06.02 SHORTNESS OF BREATH: ICD-10-CM

## 2020-03-24 DIAGNOSIS — R09.02 HYPOXIA: Primary | ICD-10-CM

## 2020-03-24 DIAGNOSIS — Z20.822 SUSPECTED COVID-19 VIRUS INFECTION: ICD-10-CM

## 2020-03-24 LAB
ALBUMIN SERPL BCP-MCNC: 4 G/DL (ref 3.5–5.2)
ALP SERPL-CCNC: 101 U/L (ref 55–135)
ALT SERPL W/O P-5'-P-CCNC: 50 U/L (ref 10–44)
ANION GAP SERPL CALC-SCNC: 11 MMOL/L (ref 8–16)
AST SERPL-CCNC: 37 U/L (ref 10–40)
BACTERIA #/AREA URNS HPF: ABNORMAL /HPF
BASOPHILS # BLD AUTO: ABNORMAL K/UL (ref 0–0.2)
BASOPHILS NFR BLD: 0 % (ref 0–1.9)
BILIRUB SERPL-MCNC: 0.3 MG/DL (ref 0.1–1)
BILIRUB UR QL STRIP: NEGATIVE
BNP SERPL-MCNC: <10 PG/ML (ref 0–99)
BUN SERPL-MCNC: 17 MG/DL (ref 8–23)
CALCIUM SERPL-MCNC: 9.7 MG/DL (ref 8.7–10.5)
CHLORIDE SERPL-SCNC: 100 MMOL/L (ref 95–110)
CLARITY UR: CLEAR
CO2 SERPL-SCNC: 27 MMOL/L (ref 23–29)
COLOR UR: YELLOW
CREAT SERPL-MCNC: 0.8 MG/DL (ref 0.5–1.4)
DIFFERENTIAL METHOD: ABNORMAL
EOSINOPHIL # BLD AUTO: ABNORMAL K/UL (ref 0–0.5)
EOSINOPHIL NFR BLD: 1 % (ref 0–8)
ERYTHROCYTE [DISTWIDTH] IN BLOOD BY AUTOMATED COUNT: 14.5 % (ref 11.5–14.5)
EST. GFR  (AFRICAN AMERICAN): >60 ML/MIN/1.73 M^2
EST. GFR  (NON AFRICAN AMERICAN): >60 ML/MIN/1.73 M^2
GLUCOSE SERPL-MCNC: 111 MG/DL (ref 70–110)
GLUCOSE UR QL STRIP: NEGATIVE
HCT VFR BLD AUTO: 35 % (ref 37–48.5)
HGB BLD-MCNC: 11.2 G/DL (ref 12–16)
HGB UR QL STRIP: NEGATIVE
IMM GRANULOCYTES # BLD AUTO: ABNORMAL K/UL (ref 0–0.04)
IMM GRANULOCYTES NFR BLD AUTO: ABNORMAL % (ref 0–0.5)
INFLUENZA A, MOLECULAR: NEGATIVE
INFLUENZA B, MOLECULAR: NEGATIVE
KETONES UR QL STRIP: NEGATIVE
LACTATE SERPL-SCNC: 0.8 MMOL/L (ref 0.5–2.2)
LEUKOCYTE ESTERASE UR QL STRIP: ABNORMAL
LYMPHOCYTES # BLD AUTO: ABNORMAL K/UL (ref 1–4.8)
LYMPHOCYTES NFR BLD: 34 % (ref 18–48)
MAGNESIUM SERPL-MCNC: 2.4 MG/DL (ref 1.6–2.6)
MCH RBC QN AUTO: 26.9 PG (ref 27–31)
MCHC RBC AUTO-ENTMCNC: 32 G/DL (ref 32–36)
MCV RBC AUTO: 84 FL (ref 82–98)
MICROSCOPIC COMMENT: ABNORMAL
MONOCYTES # BLD AUTO: ABNORMAL K/UL (ref 0.3–1)
MONOCYTES NFR BLD: 9 % (ref 4–15)
NEUTROPHILS NFR BLD: 56 % (ref 38–73)
NITRITE UR QL STRIP: NEGATIVE
NRBC BLD-RTO: 0 /100 WBC
PH UR STRIP: 6 [PH] (ref 5–8)
PLATELET # BLD AUTO: 244 K/UL (ref 150–350)
PLATELET BLD QL SMEAR: ABNORMAL
PMV BLD AUTO: 10.3 FL (ref 9.2–12.9)
POCT GLUCOSE: 126 MG/DL (ref 70–110)
POTASSIUM SERPL-SCNC: 3.8 MMOL/L (ref 3.5–5.1)
PROCALCITONIN SERPL IA-MCNC: 0.06 NG/ML
PROT SERPL-MCNC: 8.2 G/DL (ref 6–8.4)
PROT UR QL STRIP: NEGATIVE
RBC # BLD AUTO: 4.17 M/UL (ref 4–5.4)
RBC #/AREA URNS HPF: 3 /HPF (ref 0–4)
SODIUM SERPL-SCNC: 138 MMOL/L (ref 136–145)
SP GR UR STRIP: 1.01 (ref 1–1.03)
SPECIMEN SOURCE: NORMAL
SQUAMOUS #/AREA URNS HPF: 2 /HPF
TRICHOMONAS UR QL MICRO: ABNORMAL
TROPONIN I SERPL DL<=0.01 NG/ML-MCNC: 0.03 NG/ML (ref 0–0.03)
URN SPEC COLLECT METH UR: ABNORMAL
UROBILINOGEN UR STRIP-ACNC: NEGATIVE EU/DL
WBC # BLD AUTO: 5.45 K/UL (ref 3.9–12.7)
WBC #/AREA URNS HPF: 6 /HPF (ref 0–5)

## 2020-03-24 PROCEDURE — 99285 EMERGENCY DEPT VISIT HI MDM: CPT | Mod: 25

## 2020-03-24 PROCEDURE — 93005 ELECTROCARDIOGRAM TRACING: CPT

## 2020-03-24 PROCEDURE — 80053 COMPREHEN METABOLIC PANEL: CPT

## 2020-03-24 PROCEDURE — U0002 COVID-19 LAB TEST NON-CDC: HCPCS

## 2020-03-24 PROCEDURE — 83880 ASSAY OF NATRIURETIC PEPTIDE: CPT

## 2020-03-24 PROCEDURE — 87040 BLOOD CULTURE FOR BACTERIA: CPT | Mod: 59

## 2020-03-24 PROCEDURE — 85007 BL SMEAR W/DIFF WBC COUNT: CPT

## 2020-03-24 PROCEDURE — 25000003 PHARM REV CODE 250: Performed by: PHYSICIAN ASSISTANT

## 2020-03-24 PROCEDURE — 82962 GLUCOSE BLOOD TEST: CPT

## 2020-03-24 PROCEDURE — 81000 URINALYSIS NONAUTO W/SCOPE: CPT

## 2020-03-24 PROCEDURE — 93010 ELECTROCARDIOGRAM REPORT: CPT | Mod: ,,, | Performed by: INTERNAL MEDICINE

## 2020-03-24 PROCEDURE — 85027 COMPLETE CBC AUTOMATED: CPT

## 2020-03-24 PROCEDURE — 87502 INFLUENZA DNA AMP PROBE: CPT

## 2020-03-24 PROCEDURE — 96361 HYDRATE IV INFUSION ADD-ON: CPT

## 2020-03-24 PROCEDURE — 84484 ASSAY OF TROPONIN QUANT: CPT

## 2020-03-24 PROCEDURE — 84145 PROCALCITONIN (PCT): CPT

## 2020-03-24 PROCEDURE — 96360 HYDRATION IV INFUSION INIT: CPT

## 2020-03-24 PROCEDURE — 93010 EKG 12-LEAD: ICD-10-PCS | Mod: ,,, | Performed by: INTERNAL MEDICINE

## 2020-03-24 PROCEDURE — 83605 ASSAY OF LACTIC ACID: CPT

## 2020-03-24 PROCEDURE — 63600175 PHARM REV CODE 636 W HCPCS: Performed by: EMERGENCY MEDICINE

## 2020-03-24 PROCEDURE — 83735 ASSAY OF MAGNESIUM: CPT

## 2020-03-24 RX ORDER — ACETAMINOPHEN 500 MG
1000 TABLET ORAL
Status: COMPLETED | OUTPATIENT
Start: 2020-03-24 | End: 2020-03-24

## 2020-03-24 RX ADMIN — ACETAMINOPHEN 1000 MG: 500 TABLET, FILM COATED ORAL at 04:03

## 2020-03-24 RX ADMIN — SODIUM CHLORIDE 1380 ML: 0.9 INJECTION, SOLUTION INTRAVENOUS at 08:03

## 2020-03-25 VITALS
BODY MASS INDEX: 46.97 KG/M2 | DIASTOLIC BLOOD PRESSURE: 89 MMHG | OXYGEN SATURATION: 98 % | RESPIRATION RATE: 20 BRPM | SYSTOLIC BLOOD PRESSURE: 157 MMHG | TEMPERATURE: 99 F | HEART RATE: 85 BPM | WEIGHT: 233 LBS | HEIGHT: 59 IN

## 2020-03-25 LAB — TROPONIN I SERPL DL<=0.01 NG/ML-MCNC: 0.01 NG/ML (ref 0–0.03)

## 2020-03-25 PROCEDURE — 25000003 PHARM REV CODE 250: Performed by: EMERGENCY MEDICINE

## 2020-03-25 RX ORDER — ALBUTEROL SULFATE 90 UG/1
2 AEROSOL, METERED RESPIRATORY (INHALATION) EVERY 6 HOURS PRN
Qty: 18 G | Refills: 0 | Status: SHIPPED | OUTPATIENT
Start: 2020-03-25 | End: 2022-06-16 | Stop reason: SDUPTHER

## 2020-03-25 RX ORDER — METOPROLOL SUCCINATE 25 MG/1
50 TABLET, EXTENDED RELEASE ORAL
Status: COMPLETED | OUTPATIENT
Start: 2020-03-25 | End: 2020-03-25

## 2020-03-25 RX ORDER — METRONIDAZOLE 500 MG/1
500 TABLET ORAL EVERY 12 HOURS
Qty: 14 TABLET | Refills: 0 | Status: SHIPPED | OUTPATIENT
Start: 2020-03-25 | End: 2020-04-01

## 2020-03-25 RX ADMIN — METOPROLOL SUCCINATE 50 MG: 25 TABLET, FILM COATED, EXTENDED RELEASE ORAL at 12:03

## 2020-03-25 NOTE — ED PROVIDER NOTES
Encounter Date: 3/24/2020       History     Chief Complaint   Patient presents with    Shortness of Breath     c/o SOB since yesterday. Feels like it is due to fluid in her lungs. Has been taking diuretics as prescribed. Reports mild nonproductive cough and bilateral leg pain.     The patient is a 64-year-old female who has a history of arthritis, diabetes mellitus, hyperlipidemia, hypertension, and sciatica.  She presents with cough and shortness of breath that began yesterday.  The cough is nonproductive but she feels as though she has chest congestion.  She denies subjective fever and chills.  She denies headache and dizziness.  She denies chest pain.  She denies abdominal pain, nausea, vomiting, and diarrhea.  She has no urinary symptoms.  She denies sick contacts or recent domestic/international travel.  She has not, to close contact with anyone diagnosed with covid-19.  She has not taken any medications to attempt treatment for her symptoms.  She does not smoke.  She has never been diagnosed chronic lung disease.  She has no history of cardiovascular disease.    The history is provided by the patient. No  was used.     Review of patient's allergies indicates:   Allergen Reactions    Codeine Nausea And Vomiting    Lisinopril      Past Medical History:   Diagnosis Date    Arthritis     Diabetes mellitus     Hyperlipidemia     Hypertension     Pre-diabetes     Sciatica      Past Surgical History:   Procedure Laterality Date    HYSTERECTOMY      LEG SURGERY      TONSILLECTOMY       No family history on file.  Social History     Tobacco Use    Smoking status: Never Smoker    Smokeless tobacco: Never Used   Substance Use Topics    Alcohol use: No    Drug use: Not on file     Review of Systems   Constitutional: Negative for chills, fatigue and fever.   HENT: Negative for sore throat and trouble swallowing.    Eyes: Negative for visual disturbance.   Respiratory: Positive for cough  and shortness of breath.    Cardiovascular: Negative for chest pain and palpitations.   Gastrointestinal: Negative for abdominal pain, diarrhea, nausea and vomiting.   Endocrine: Negative for polydipsia and polyuria.   Genitourinary: Negative for dysuria and frequency.   Musculoskeletal: Positive for myalgias (Bilateral legs).   Skin: Negative for rash.   Allergic/Immunologic: Negative for immunocompromised state.   Neurological: Negative for dizziness, syncope, light-headedness and headaches.       Physical Exam     Initial Vitals [03/24/20 1649]   BP Pulse Resp Temp SpO2   (!) 147/67 85 (!) 25 (!) 100.7 °F (38.2 °C) (!) 92 %      MAP       --         Physical Exam    Nursing note and vitals reviewed.  Constitutional: She appears well-developed and well-nourished. She is not diaphoretic. No distress.   HENT:   Head: Normocephalic and atraumatic.   Mouth/Throat: Oropharynx is clear and moist.   Eyes: Conjunctivae are normal. No scleral icterus.   Neck: No JVD present.   Cardiovascular: Normal rate, regular rhythm and normal heart sounds. Exam reveals no gallop and no friction rub.    No murmur heard.  Pulmonary/Chest: Effort normal and breath sounds normal. No stridor. No respiratory distress. She has no decreased breath sounds. She has no wheezes. She has no rhonchi. She has no rales.   Abdominal: Soft. She exhibits no distension. There is no tenderness.   Neurological: She is alert and oriented to person, place, and time. GCS score is 15. GCS eye subscore is 4. GCS verbal subscore is 5. GCS motor subscore is 6.   Skin: Skin is warm and dry. No pallor.         ED Course   Procedures  Labs Reviewed   CBC W/ AUTO DIFFERENTIAL - Abnormal; Notable for the following components:       Result Value    Hemoglobin 11.2 (*)     Hematocrit 35.0 (*)     Mean Corpuscular Hemoglobin 26.9 (*)     All other components within normal limits   COMPREHENSIVE METABOLIC PANEL - Abnormal; Notable for the following components:    Glucose  111 (*)     ALT 50 (*)     All other components within normal limits   URINALYSIS, REFLEX TO URINE CULTURE - Abnormal; Notable for the following components:    Leukocytes, UA 3+ (*)     All other components within normal limits    Narrative:     Preferred Collection Type->Urine, Clean Catch   TROPONIN I - Abnormal; Notable for the following components:    Troponin I 0.027 (*)     All other components within normal limits   URINALYSIS MICROSCOPIC - Abnormal; Notable for the following components:    WBC, UA 6 (*)     Trichomonas, UA Occasional (*)     All other components within normal limits    Narrative:     Preferred Collection Type->Urine, Clean Catch   POCT GLUCOSE - Abnormal; Notable for the following components:    POCT Glucose 126 (*)     All other components within normal limits   INFLUENZA A & B BY MOLECULAR   CULTURE, BLOOD   CULTURE, BLOOD   LACTIC ACID, PLASMA   MAGNESIUM   B-TYPE NATRIURETIC PEPTIDE   PROCALCITONIN   TROPONIN I    Narrative:     Redraw at 23:15   SARS-COV-2 (COVID-19) QUALITATIVE PCR   POCT GLUCOSE MONITORING CONTINUOUS     EKG Readings: (Independently Interpreted)   03/24/2020 17:04  Normal sinus rhythm.  Ventricular rate 82 beats per minute.  Normal axis.  Normal QRS and QT intervals.  No ST segment elevation or depression.  Normal T-wave morphology.  Overall impression-normal EKG.       Imaging Results          X-Ray Chest AP Portable (Final result)  Result time 03/24/20 17:49:01    Final result by Arben Wiggins MD (03/24/20 17:49:01)                 Impression:      1. Suboptimal inspiration with no acute radiographic abnormality.  2. Probable hiatal hernia.      Electronically signed by: Arben Wiggins  Date:    03/24/2020  Time:    17:49             Narrative:    EXAMINATION:  XR CHEST AP PORTABLE    CLINICAL HISTORY:  Shortness of breath    TECHNIQUE:  Single frontal view of the chest was performed.    COMPARISON:  03/17/2019    FINDINGS:  Suboptimal inspiration.    Heart is  upper normal size.    No edema.    No effusion or pneumothorax.    Ovoid density at the midline could represent a moderate to large hiatal hernia.  Correlation with esophagram may be helpful.    No evidence of any pulmonary mass, significant infiltrate or consolidation.    No osseous destruction.                                 Medical Decision Making:   History:   Old Medical Records: I decided to obtain old medical records.  Old Records Summarized: other records.       <> Summary of Records: Reviewed past medical history, see HPI.  Independently Interpreted Test(s):   I have ordered and independently interpreted EKG Reading(s) - see prior notes  Clinical Tests:   Lab Tests: Ordered and Reviewed  Radiological Study: Ordered and Reviewed  Medical Tests: Reviewed and Ordered    Medical decision making:  This patient underwent evaluation for cough, shortness of breath, and aching discomfort to her lower extremities.  She denies subjective fever but was febrile on ED arrival.  She also had mild tachypnea and hypoxia at triage.  On exam, she was in no respiratory distress and had clear breath sounds.  Differential diagnoses included influenza, covid-19 infection, pneumonia, acute coronary syndromes, new onset CHF remote conditions.  Flu test is negative.  Chest x-ray is negative for acute processes.  She does not have leukocytosis or leukopenia.  There is no elevation of lactic acid or procalcitonin.  Troponin was minimally elevated.  EKG was negative for arrhythmia and acute ischemic changes.  BNP was not elevated.  Initially, I discussed admission with [facility] internal medicine for monitoring due to hypoxia and trending of troponins.  Since the patient was in no respiratory distress, the decision was made to perform additional ED monitoring and obtain a repeat troponin.  The repeat troponin is not elevated.  The patient did not develop signs of respiratory distress.  I suspect that she has covid-19 infection.  She is  stable for discharge.  She is being prescribed albuterol MDI.  I instructed her on device usage.  Incidentally, Trichomonas was found on the patient's urinalysis.  She is being prescribed a course of Flagyl.  I have submitted her for home symptom monitoring due to hypoxia.  She has been instructed to implement home quarantine with social-distancing and isolation as much as possible.  She has been instructed to return to the ED for breathing difficulty or any other concerns.  Otherwise, she has been instructed to arrange close follow-up with her PCP.                   ED Course as of Mar 25 0134   Tue Mar 24, 2020   2143 Discussed patient's presentation, exam, and workup with the [facility] Internal Medicine resident on-call who will come and evaluate the patient.  My plan is for the patient to be placed in observation for trending of her troponin and monitoring due to hypoxia.    [LP]   2227 The patient has been evaluated by the [facility] Medicine resident.  They feel that it is highly unlikely that the patient has covid-19 which would explain cough, shortness of breath, fever, and hypoxia.  Since the patient is in no respiratory distress, they feel that discharge is appropriate.  They have requested that the patient be monitoring the ED and a repeat troponin be obtain with the plan to discharge if it is decreased from initial test.    [LP]   Wed Mar 25, 2020   0126 Troponin I: 0.013 [LP]   0126 WBC: 5.45 [LP]   0126 Hemoglobin(!): 11.2 [LP]   0126 Hematocrit(!): 35.0 [LP]   0126 Platelets: 244 [LP]   0126 Leukocytes, UA(!): 3+ [LP]   0126 WBC, UA(!): 6 [LP]   0126 Squam Epithel, UA: 2 [LP]   0126 Trichomonas, UA(!): Occasional [LP]   0126 Influenza A, Molecular: Negative [LP]   0126 Influenza B, Molecular: Negative [LP]   0127 Lactate, Jovi: 0.8 [LP]      ED Course User Index  [LP] Cam Lee III, MD                Clinical Impression:       ICD-10-CM ICD-9-CM   1. Hypoxia R09.02 799.02   2. Shortness of breath  R06.02 786.05   3. Acute febrile illness R50.9 780.60   4. Suspected Covid-19 Virus Infection R68.89          Disposition:   Disposition: Discharged  Condition: Stable                        Cam Lee III, MD  03/25/20 0153

## 2020-03-25 NOTE — ED NOTES
Review of patient's allergies indicates:   Allergen Reactions    Codeine Nausea And Vomiting    Lisinopril         Patient has verified the spelling of their name and  on armband.   APPEARANCE: Patient is alert, calm, oriented x 4, and does not appear distressed.  SKIN: Skin is normal for race, warm, and dry. Normal skin turgor and mucous membranes moist.  CARDIAC: Normal rate and rhythm, no murmur heard.   RESPIRATORY:Normal rate and effort. Breath sounds clear bilaterally throughout chest. Respirations are equal and unlabored, but c/o SOB.   GASTRO: Bowel sounds normal, abdomen is soft, no tenderness, and no abdominal distention.  MUSCLE: Full ROM. No bony tenderness or soft tissue tenderness. No obvious deformity.  PERIPHERAL VASCULAR: peripheral pulses present. Normal cap refill. No edema. Warm to touch.  NEURO: Derek coma scale: eyes open spontaneously-4, oriented & converses-5, obeys commands-6. No neurological abnormalities.   MENTAL STATUS: awake, alert and aware of environment.  EYE: No overt deficits noted. No drainage. Sclera WNL  ENT: EARS: no obvious drainage. NOSE: no active bleeding. THROAT: no redness or swelling.  : Voids without complication

## 2020-03-26 LAB — SARS-COV-2 RNA RESP QL NAA+PROBE: DETECTED

## 2020-03-27 NOTE — PROGRESS NOTES
Second attempt to contact patient regarding POSITIVE COVID-19 testing, no answer. Will send certified letter.

## 2020-03-29 LAB
BACTERIA BLD CULT: NORMAL
BACTERIA BLD CULT: NORMAL

## 2020-09-24 ENCOUNTER — OFFICE VISIT (OUTPATIENT)
Dept: INTERNAL MEDICINE | Facility: CLINIC | Age: 65
End: 2020-09-24
Payer: MEDICARE

## 2020-09-24 VITALS
DIASTOLIC BLOOD PRESSURE: 70 MMHG | SYSTOLIC BLOOD PRESSURE: 140 MMHG | BODY MASS INDEX: 47.44 KG/M2 | OXYGEN SATURATION: 97 % | WEIGHT: 241.63 LBS | HEIGHT: 60 IN | TEMPERATURE: 98 F | RESPIRATION RATE: 18 BRPM | HEART RATE: 78 BPM

## 2020-09-24 DIAGNOSIS — Z11.59 ENCOUNTER FOR HEPATITIS C SCREENING TEST FOR LOW RISK PATIENT: ICD-10-CM

## 2020-09-24 DIAGNOSIS — Z01.00 DIABETIC EYE EXAM: ICD-10-CM

## 2020-09-24 DIAGNOSIS — I51.89 GRADE I DIASTOLIC DYSFUNCTION: ICD-10-CM

## 2020-09-24 DIAGNOSIS — Z78.0 POST-MENOPAUSAL: ICD-10-CM

## 2020-09-24 DIAGNOSIS — Z00.00 ENCOUNTER FOR PREVENTIVE HEALTH EXAMINATION: Primary | ICD-10-CM

## 2020-09-24 DIAGNOSIS — E11.9 CONTROLLED TYPE 2 DIABETES MELLITUS WITHOUT COMPLICATION, WITHOUT LONG-TERM CURRENT USE OF INSULIN: ICD-10-CM

## 2020-09-24 DIAGNOSIS — B37.9 YEAST INFECTION: ICD-10-CM

## 2020-09-24 DIAGNOSIS — Z11.4 ENCOUNTER FOR SCREENING FOR HIV: ICD-10-CM

## 2020-09-24 DIAGNOSIS — E11.9 DIABETIC EYE EXAM: ICD-10-CM

## 2020-09-24 DIAGNOSIS — E78.2 MIXED HYPERLIPIDEMIA: ICD-10-CM

## 2020-09-24 DIAGNOSIS — I10 ESSENTIAL HYPERTENSION: ICD-10-CM

## 2020-09-24 PROCEDURE — 99999 PR PBB SHADOW E&M-EST. PATIENT-LVL V: ICD-10-PCS | Mod: PBBFAC,,, | Performed by: HOSPITALIST

## 2020-09-24 PROCEDURE — 90670 PNEUMOCOCCAL CONJUGATE VACCINE 13-VALENT LESS THAN 5YO & GREATER THAN: ICD-10-PCS | Mod: S$GLB,,, | Performed by: HOSPITALIST

## 2020-09-24 PROCEDURE — 90670 PCV13 VACCINE IM: CPT | Mod: S$GLB,,, | Performed by: HOSPITALIST

## 2020-09-24 PROCEDURE — 3078F PR MOST RECENT DIASTOLIC BLOOD PRESSURE < 80 MM HG: ICD-10-PCS | Mod: CPTII,S$GLB,, | Performed by: HOSPITALIST

## 2020-09-24 PROCEDURE — 3077F PR MOST RECENT SYSTOLIC BLOOD PRESSURE >= 140 MM HG: ICD-10-PCS | Mod: CPTII,S$GLB,, | Performed by: HOSPITALIST

## 2020-09-24 PROCEDURE — 99204 PR OFFICE/OUTPT VISIT, NEW, LEVL IV, 45-59 MIN: ICD-10-PCS | Mod: 25,S$GLB,, | Performed by: HOSPITALIST

## 2020-09-24 PROCEDURE — 3078F DIAST BP <80 MM HG: CPT | Mod: CPTII,S$GLB,, | Performed by: HOSPITALIST

## 2020-09-24 PROCEDURE — G0009 PNEUMOCOCCAL CONJUGATE VACCINE 13-VALENT LESS THAN 5YO & GREATER THAN: ICD-10-PCS | Mod: S$GLB,,, | Performed by: HOSPITALIST

## 2020-09-24 PROCEDURE — 3077F SYST BP >= 140 MM HG: CPT | Mod: CPTII,S$GLB,, | Performed by: HOSPITALIST

## 2020-09-24 PROCEDURE — 99999 PR PBB SHADOW E&M-EST. PATIENT-LVL V: CPT | Mod: PBBFAC,,, | Performed by: HOSPITALIST

## 2020-09-24 PROCEDURE — G0009 ADMIN PNEUMOCOCCAL VACCINE: HCPCS | Mod: S$GLB,,, | Performed by: HOSPITALIST

## 2020-09-24 PROCEDURE — 99204 OFFICE O/P NEW MOD 45 MIN: CPT | Mod: 25,S$GLB,, | Performed by: HOSPITALIST

## 2020-09-24 RX ORDER — METOPROLOL TARTRATE 100 MG/1
100 TABLET ORAL 2 TIMES DAILY
Qty: 180 TABLET | Refills: 3 | Status: SHIPPED | OUTPATIENT
Start: 2020-09-24 | End: 2022-06-16 | Stop reason: SDUPTHER

## 2020-09-24 RX ORDER — FUROSEMIDE 40 MG/1
TABLET ORAL
COMMUNITY
End: 2022-06-16 | Stop reason: ALTCHOICE

## 2020-09-24 RX ORDER — SPIRONOLACTONE 25 MG/1
25 TABLET ORAL DAILY
COMMUNITY
Start: 2020-09-21 | End: 2022-06-16 | Stop reason: DRUGHIGH

## 2020-09-24 RX ORDER — CYCLOBENZAPRINE HCL 10 MG
10 TABLET ORAL 2 TIMES DAILY PRN
COMMUNITY
Start: 2020-06-24 | End: 2022-06-16

## 2020-09-24 RX ORDER — FLUCONAZOLE 150 MG/1
150 TABLET ORAL DAILY
Qty: 1 TABLET | Refills: 0 | Status: SHIPPED | OUTPATIENT
Start: 2020-09-24 | End: 2020-09-25

## 2020-09-24 RX ORDER — DICLOFENAC SODIUM 75 MG/1
75 TABLET, DELAYED RELEASE ORAL 2 TIMES DAILY
COMMUNITY
Start: 2020-08-27 | End: 2020-12-24

## 2020-09-24 RX ORDER — PREDNISONE 20 MG/1
TABLET ORAL
COMMUNITY
End: 2020-09-24

## 2020-09-24 RX ORDER — EMPAGLIFLOZIN 25 MG/1
25 TABLET, FILM COATED ORAL DAILY
COMMUNITY
Start: 2020-07-07 | End: 2022-06-16 | Stop reason: DRUGHIGH

## 2020-09-24 RX ORDER — AMLODIPINE BESYLATE 10 MG/1
10 TABLET ORAL DAILY
COMMUNITY
Start: 2020-08-27 | End: 2023-01-09 | Stop reason: DRUGHIGH

## 2020-09-24 RX ORDER — METHYLPREDNISOLONE 32 MG/1
TABLET ORAL
COMMUNITY
Start: 2020-08-28 | End: 2022-06-16

## 2020-09-24 RX ORDER — METOPROLOL TARTRATE 100 MG/1
100 TABLET ORAL 2 TIMES DAILY
COMMUNITY
Start: 2020-07-26 | End: 2020-09-24 | Stop reason: SDUPTHER

## 2020-09-24 RX ORDER — ATORVASTATIN CALCIUM 40 MG/1
40 TABLET, FILM COATED ORAL DAILY
COMMUNITY
Start: 2020-08-25 | End: 2022-06-16 | Stop reason: DRUGHIGH

## 2020-09-24 RX ORDER — GABAPENTIN 300 MG/1
300 CAPSULE ORAL 3 TIMES DAILY
Qty: 270 CAPSULE | Refills: 3 | Status: SHIPPED | OUTPATIENT
Start: 2020-09-24

## 2020-09-24 RX ORDER — PANTOPRAZOLE SODIUM 40 MG/1
40 TABLET, DELAYED RELEASE ORAL DAILY PRN
COMMUNITY

## 2020-09-24 NOTE — PROGRESS NOTES
Subjective:     @Patient ID: Christie Damian is a 65 y.o. female.    Chief Complaint: Establish Care    HPI    65 y.o. female here for annual exam and establish care. Pt is new to me. Her last PCP was Dr Pierre. She works at [employer]. Was a cook but now in management. She has 3 daughters. She reports she is doing well . Does c/o burning/itching of vaginal area. No discharge and no dysuria. Reports typical for her yeast infection. Reports she also had angiogram done with o/s cardiology Dr Meek recently that returned fine. States she was being worked up as she reported sob. Reports was prescribed albuterol     Lipid disorders/ASCVD risk (ages >/= 45 or >/= 20 if increased risk ): ordered  DM (>45y yearly or if obese, HTN): A1c ordered  Hepatitis C (one time if born between 2396-3875): ordered   Eye exam: due  Breast Cancer (40-50y discretion of pt, 50-74y every 1-2 years): Mammogram done 2019  Cervical Cancer (Pap Smear ages 21-65 every 3 years or Pap + HPV q5 years after 30 years of age):     Colorectal Cancer (normal risk 50-75yr): Colonoscopy. Pt reports done 1 year ago. Will get o/s records once she provides name of doctor who performed the test   DEXA (F>64 yo, M >69yo, M&F 50-70 yo with risk factors (smoking, previous fx, wt <70kg; etoh abuse, chronic steroids, RA)): due      Vaccines:   Influenza (yearly) she will get at work  Tetanus (every 10 yrs - 1st tdap) defer   PPSV23(>66yo or <65 w/ lung dz, smoking, DM)  due  PCV13 (> 65 or <65 w/ immunocompromised)   Zoster (>59yo) defer    Exercise:    Diet: reg               Review of Systems   Constitutional: Negative for chills and fever.   HENT: Negative for congestion and sore throat.    Eyes: Negative for pain and visual disturbance.   Respiratory: Negative for cough and shortness of breath.    Cardiovascular: Negative for chest pain and leg swelling.   Gastrointestinal: Negative for abdominal pain, nausea and vomiting.   Endocrine: Negative for polydipsia  and polyuria.   Genitourinary: Negative for difficulty urinating and dysuria.   Musculoskeletal: Negative for arthralgias and back pain.   Skin: Negative for rash and wound.   Neurological: Negative for dizziness, weakness and headaches.   Psychiatric/Behavioral: Negative for agitation and confusion.     Past medical history, surgical history, and family medical history reviewed and updated as appropriate.    Medications and allergies reviewed.     Objective:     Vitals:    09/24/20 0951   BP: (!) 140/70   BP Location: Left arm   Patient Position: Sitting   BP Method: Large (Manual)   Pulse: 78   Resp: 18   Temp: 97.5 °F (36.4 °C)   TempSrc: Temporal   SpO2: 97%   Weight: 109.6 kg (241 lb 10 oz)   Height: 5' (1.524 m)     Body mass index is 47.19 kg/m².  Physical Exam  Vitals signs reviewed.   Constitutional:       General: She is not in acute distress.     Appearance: She is well-developed.   HENT:      Head: Normocephalic and atraumatic.      Right Ear: Tympanic membrane normal.      Left Ear: Tympanic membrane normal.      Mouth/Throat:      Mouth: Mucous membranes are moist.      Pharynx: No oropharyngeal exudate.   Eyes:      General:         Right eye: No discharge.         Left eye: No discharge.      Conjunctiva/sclera: Conjunctivae normal.   Neck:      Musculoskeletal: Normal range of motion and neck supple.   Cardiovascular:      Rate and Rhythm: Normal rate and regular rhythm.      Heart sounds: No murmur. No friction rub.   Pulmonary:      Effort: Pulmonary effort is normal.      Breath sounds: Normal breath sounds.   Abdominal:      General: Bowel sounds are normal. There is no distension.      Palpations: Abdomen is soft.      Tenderness: There is no abdominal tenderness. There is no guarding.   Musculoskeletal: Normal range of motion.      Right lower leg: No edema.      Left lower leg: No edema.   Skin:     General: Skin is warm and dry.   Neurological:      Mental Status: She is alert and oriented  to person, place, and time.   Psychiatric:         Mood and Affect: Mood normal.         Behavior: Behavior normal.         Lab Results   Component Value Date    WBC 5.45 03/24/2020    HGB 11.2 (L) 03/24/2020    HCT 35.0 (L) 03/24/2020     03/24/2020    CHOL 144 04/25/2016    TRIG 144 04/25/2016    HDL 41 04/25/2016    ALT 50 (H) 03/24/2020    AST 37 03/24/2020     03/24/2020    K 3.8 03/24/2020     03/24/2020    CREATININE 0.8 03/24/2020    BUN 17 03/24/2020    CO2 27 03/24/2020    TSH 2.880 04/24/2016    INR 1.25 (H) 04/25/2016    HGBA1C 6.3 (H) 03/17/2019    HGBA1C 6.3 (H) 03/17/2019       Assessment:     1. Encounter for preventive health examination    2. Controlled type 2 diabetes mellitus without complication, without long-term current use of insulin    3. Grade I diastolic dysfunction    4. Essential hypertension    5. Mixed hyperlipidemia    6. Yeast infection    7. Encounter for hepatitis C screening test for low risk patient    8. Encounter for screening for HIV    9. Diabetic eye exam    10. Post-menopausal      Plan:   Christie was seen today for establish care.    Diagnoses and all orders for this visit:    Encounter for preventive health examination    Controlled type 2 diabetes mellitus without complication, without long-term current use of insulin  -     Comprehensive metabolic panel; Future  -     CBC auto differential; Future  -     Urinalysis; Future  -     HEMOGLOBIN A1C; Future  -     Microalbumin/creatinine urine ratio; Future    Grade I diastolic dysfunction        - Stable.     Essential hypertension  -     TSH; Future    Mixed hyperlipidemia  -     Lipid Panel; Future    Yeast infection         - Diflucan x1     Encounter for hepatitis C screening test for low risk patient  -     Hepatitis C Antibody; Future    Encounter for screening for HIV  -     HIV 1/2 Ag/Ab (4th Gen); Future    Diabetic eye exam  -     Ambulatory referral/consult to Optometry;  Future    Post-menopausal  -     DXA Bone Density Spine And Hip; Future    Other orders  -     (In Office Administered) Pneumococcal Conjugate Vaccine (13 Valent) (IM)  -     Cancel: Case request GI: COLONOSCOPY  -     metoprolol tartrate (LOPRESSOR) 100 MG tablet; Take 1 tablet (100 mg total) by mouth 2 (two) times daily.  -     gabapentin (NEURONTIN) 300 MG capsule; Take 1 capsule (300 mg total) by mouth 3 (three) times daily.  -     fluconazole (DIFLUCAN) 150 MG Tab; Take 1 tablet (150 mg total) by mouth once daily. for 1 day          Michelle Nieves MD  Internal Medicine    9/24/2020

## 2020-09-29 ENCOUNTER — TELEPHONE (OUTPATIENT)
Dept: INTERNAL MEDICINE | Facility: CLINIC | Age: 65
End: 2020-09-29

## 2020-09-29 PROBLEM — R73.03 PRE-DIABETES: Status: RESOLVED | Noted: 2019-03-17 | Resolved: 2020-09-29

## 2020-09-29 NOTE — TELEPHONE ENCOUNTER
----- Message from Michelle Nieves MD sent at 9/29/2020 10:10 AM CDT -----  Please notify pt:   - hiv, hep c negative  - a1c shows diabetes is controlled at 6.5  - thyroid, electrolytes, kidney and liver tests are normal   - cholesterol shows triglycerides are high. Recommend a low fat/low cholesterol diet, exercise and continue atorvastatin   - blood count is stable   - urine is negative for protein. Did show yeast. Diflucan treats this

## 2020-10-02 ENCOUNTER — APPOINTMENT (OUTPATIENT)
Dept: RADIOLOGY | Facility: CLINIC | Age: 65
End: 2020-10-02
Attending: HOSPITALIST
Payer: MEDICARE

## 2020-10-02 DIAGNOSIS — Z78.0 POST-MENOPAUSAL: ICD-10-CM

## 2020-10-02 PROCEDURE — 77080 DXA BONE DENSITY AXIAL: CPT | Mod: TC,PO

## 2020-10-02 PROCEDURE — 77080 DEXA BONE DENSITY SPINE HIP: ICD-10-PCS | Mod: 26,,, | Performed by: INTERNAL MEDICINE

## 2020-10-02 PROCEDURE — 77080 DXA BONE DENSITY AXIAL: CPT | Mod: 26,,, | Performed by: INTERNAL MEDICINE

## 2020-10-08 ENCOUNTER — TELEPHONE (OUTPATIENT)
Dept: INTERNAL MEDICINE | Facility: CLINIC | Age: 65
End: 2020-10-08

## 2020-10-08 NOTE — TELEPHONE ENCOUNTER
----- Message from Michelle Nieves MD sent at 10/7/2020  5:53 PM CDT -----  Please notify pt that bone density is normal. 1. Recommend daily calcium intake 8752-5276 mg, dietary sources preferred; Vitamin D 5015-0092 IU daily.  2. Adequate exercise and fall precautions

## 2020-10-30 DIAGNOSIS — Z12.11 COLON CANCER SCREENING: ICD-10-CM

## 2020-11-06 ENCOUNTER — OFFICE VISIT (OUTPATIENT)
Dept: OPTOMETRY | Facility: CLINIC | Age: 65
End: 2020-11-06
Payer: MEDICARE

## 2020-11-06 DIAGNOSIS — E11.9 DIABETIC EYE EXAM: ICD-10-CM

## 2020-11-06 DIAGNOSIS — H52.4 HYPEROPIA OF BOTH EYES WITH REGULAR ASTIGMATISM AND PRESBYOPIA: ICD-10-CM

## 2020-11-06 DIAGNOSIS — H02.886 MEIBOMIAN GLAND DYSFUNCTION (MGD) OF BOTH EYES: ICD-10-CM

## 2020-11-06 DIAGNOSIS — H02.883 MEIBOMIAN GLAND DYSFUNCTION (MGD) OF BOTH EYES: ICD-10-CM

## 2020-11-06 DIAGNOSIS — Z01.00 DIABETIC EYE EXAM: ICD-10-CM

## 2020-11-06 DIAGNOSIS — E11.9 DIABETES MELLITUS TYPE 2 WITHOUT RETINOPATHY: Primary | ICD-10-CM

## 2020-11-06 DIAGNOSIS — H52.223 HYPEROPIA OF BOTH EYES WITH REGULAR ASTIGMATISM AND PRESBYOPIA: ICD-10-CM

## 2020-11-06 DIAGNOSIS — H52.03 HYPEROPIA OF BOTH EYES WITH REGULAR ASTIGMATISM AND PRESBYOPIA: ICD-10-CM

## 2020-11-06 DIAGNOSIS — H10.45 CHRONIC ALLERGIC CONJUNCTIVITIS: ICD-10-CM

## 2020-11-06 DIAGNOSIS — H25.13 NUCLEAR SCLEROTIC CATARACT, BILATERAL: ICD-10-CM

## 2020-11-06 PROCEDURE — 92015 PR REFRACTION: ICD-10-PCS | Mod: S$GLB,,, | Performed by: OPTOMETRIST

## 2020-11-06 PROCEDURE — 92015 DETERMINE REFRACTIVE STATE: CPT | Mod: S$GLB,,, | Performed by: OPTOMETRIST

## 2020-11-06 PROCEDURE — 99499 UNLISTED E&M SERVICE: CPT | Mod: S$GLB,,, | Performed by: OPTOMETRIST

## 2020-11-06 PROCEDURE — 92004 COMPRE OPH EXAM NEW PT 1/>: CPT | Mod: S$GLB,,, | Performed by: OPTOMETRIST

## 2020-11-06 PROCEDURE — 99499 RISK ADDL DX/OHS AUDIT: ICD-10-PCS | Mod: S$GLB,,, | Performed by: OPTOMETRIST

## 2020-11-06 PROCEDURE — 99999 PR PBB SHADOW E&M-EST. PATIENT-LVL III: CPT | Mod: PBBFAC,,, | Performed by: OPTOMETRIST

## 2020-11-06 PROCEDURE — 99999 PR PBB SHADOW E&M-EST. PATIENT-LVL III: ICD-10-PCS | Mod: PBBFAC,,, | Performed by: OPTOMETRIST

## 2020-11-06 PROCEDURE — 92004 PR EYE EXAM, NEW PATIENT,COMPREHESV: ICD-10-PCS | Mod: S$GLB,,, | Performed by: OPTOMETRIST

## 2020-11-06 NOTE — LETTER
November 6, 2020      Michelle Nieves MD  [address]  [place] [address]           [facility] 1st Fl  [address]  [place] [address]  Phone: [phone]  Fax: [phone]          Patient: Christie Damian   MR Number: [id number]   YOB: 1955   Date of Visit: 11/6/2020       Dear Dr. Michelle Nieves:    Thank you for referring Christie Damian to me for evaluation. Attached you will find relevant portions of my assessment and plan of care.    If you have questions, please do not hesitate to call me. I look forward to following Christie Damian along with you.    Sincerely,    Jyoti Lee, OD    Enclosure  CC:  No Recipients    If you would like to receive this communication electronically, please contact [email or web address] or [phone] to request more information on [employer] Link access.    For providers and/or their staff who would like to refer a patient to [facility], please contact us through our one-stop-shop provider referral line, [facility], at [phone].    If you feel you have received this communication in error or would no longer like to receive these types of communications, please e-mail [email or web address]

## 2020-11-06 NOTE — PATIENT INSTRUCTIONS
What Is Diabetic Retinopathy?    The retina is the light-sensitive part of the eye that allows you to see. High blood sugar can damage blood vessels of the retina and cause them to leak or bleed. This damage can lead to abnormal blood vessel growth. This condition is called diabetic retinopathy. You may not have symptoms early-on in the disease. Later in the disease process, you may notice floaters, blurred vision, or poor night vision. In the most severe cases, there may be partial or complete vision loss. Due to the severity of this condition, it is important to catch this disease early, before you notice any changes in your vision. We check for this disease through a yearly dilated eye exam.     Early cases of diabetic retinopathy can be treated by carefully controlling blood sugar, blood pressure, and cholesterol. Later, more severe, cases of diabetic retinopathy may be treated by laser treatments, surgery, or injections into the eye. Laser surgery can shrink abnormal blood vessels or close ones that are leaking. Medicines injected in the eye can help decrease swelling in the retina.    Managing Diabetes  · Take all medicines, including insulin or oral diabetic medicine, exactly as prescribed.  · Follow the diet advised by your healthcare provider. If you have high cholesterol, follow a low-fat, low-cholesterol diet.  · Monitor blood sugars as advised.  · Try to achieve your ideal weight.  · If you smoke, quit smoking. Tobacco use worsens the effect of diabetes on your blood vessels.  · If you have high blood pressure, consider buying an automatic blood pressure machine. These are available at most pharmacies. Use this to monitor your blood pressure. Report your blood pressure readings to your healthcare provider.  · Exercise regularly. Even taking frequent walks can help.     Further Reading:  American Academy of Ophthamology: Diabetic Retinopathy  [facility] [place]: Diabetic Retinopathy  [facility]:  Diabetic Retinopathy      Adapted from:  © 8746-4754 [employer]. [address] [place] [address], [place] [address]. All rights reserved. This information is not intended as a substitute for professional medical care. Always follow your healthcare professional's instructions.    What is Allergic Conjunctivitis or Ocular Allergies?     Just as allergens can irritate your lungs and nose, they can irritate the surface of your eyes. Ocular allergies can occur in conjunction with systemic allergies or on their own. Just like systemic allergies, ocular allergies are a chronic problem requiring regular maintenance and treatment. Symptoms of ocular allergies can include: Redness, Itchiness, Burning, Watering and Swollen eyelids.     Recommended Treatment      Eye Drops: Over the Counter  o These are topical anti-histamines, similar to the one you take by mouth to treat allergies. It is best to avoid Visine, Clear eyes, and any other drop which contains Tetrahydrozoline HCl or Naphazoline as these drops may provide temporary relief without treating the underlying problem. If you have a severe round of allergic conjunctivitis, your doctor may prescribe a stronger eye drop not listed here to help control it.                           Lubricating drops or Artificial Tears  o Using over the counter Artificial Tears or Lubricating drops can also help by washing away allergens and preventing dry eye symptoms. For over the counter drops, name brands like Systane, Blink and Refresh typically work best.   Cool Compresses  o If your lids are swollen and itchy, a cool compress can provide temporary relief.    Remove allergens  o Just like with systemic allergens, frequent washing of pillow cases and use of air filters can help reduce ocular allergies.     Further Reading:   Allergic Conjuctivitis    What is Dry Eye and Meibomian Gland Deficiency?     The surface of our eyes are covered with a layer of tears. This tear film  is composed of three layers: an oil (lipid) layer, a water layer, and a mucous layer. Dryness occurs when something goes wrong with any of these layers.                       If the first layer, the oil (lipid) layer, is affected the most common cause is meibomian gland deficiency. The meibomian glands are located in our eye lids and secrete the oil layer of the tear film. These glands secrete their contents most effectively each time we make a full blink. Every time we sit at a computer, or use a phone, we do not blink fully. These partial blinks do not allow the meibomian glands to secrete their oily contents. If they don't release their contents, the meibomian glands become clogged. Over long periods of time, this blockage can lead to atrophy and meibomian gland dropout. If the meibomian glands are clogged, there is no oil layer on the tear film. Without the oil layer, the water layer of the tears evaporates more quickly than usual. This rapid evaporation triggers reflex tearing, or watering, as the eye tries to quickly replace the lost tears.      If the second layer, the water layer, is affected the most common cause if an issue with the lacrimal gland. The lacrimal gland secretes the water portion of our tears. As we age, the lacrimal glands produces fewer and fewer tears. Other things, like autoimmune conditions, can also reduce the production of tears by the lacrimal gland.   If either our meibomian glands or our lacrimal gland are not working at their full potential, our eyes can often feel dry and scratchy or irritated. Severe dryness can also cause blurry vision and redness. Dry eye is a chronic condition, which waxes and wanes throughout life. Constant management is needed to control symptoms.     Recommended Treatment   Treatment for Dry Eye differs for each patient. These are general recommendations which your doctor may discuss with you during your visit.      Artifical Tears  o Artifical tears, also  called re-wetting or lubricating drops, are not prescribed medications and can be found over the counter. Feel free to use Artificial tears as much as you feel necessary. Frequent use of Artifical Tears rarely causes any problems. Any person with dry eye would benefit from using artificial tears at least three to four times a day. If using tears more than four times a day, please use a preservative-free artifical tear.     o Using a drop when first sitting down at the computer is a particularly good idea to prevent any discomfort. In general, using a drop before your eyes feel dry can also prevent the feelings of dryness and irritation. When looking for eye drops, name brands like Systane, Refresh, and Blink are always preferred. These brands contain fewer harmful preservatives and have been proven to be effective in treating Dry Eye.     o General Artifical Tear Recommendations (for use less than four times a day)            o Preservative-Free Artificial Tears (for use more than four times a day and for severe dry eye)          Warm Compresses  o Warm compresses placed on closed eyes for 5-10 minutes can help clear blocked meibomian glands and prevent meibomian gland dropout. After using a warm compress, you may notice your vision is slightly blurry. This is from the now melted and cleared debris, and should resolve quickly. Warm compresses work best when they are regular part of your daily routine, like brushing your teeth.   o Recommended brands:  Tania, Thermalon, TheraPearl               Further Reading:   [employer]: Dry Eye Syndrome   American Optometric Association: Dry Eye   Dry Eye [place] (photo source)  

## 2020-11-06 NOTE — Clinical Note
Hello,   I saw your patient for her Diabetic Eye Exam today. No Diabetic Retinopathy was noted on today's dilated eye exam.   Thank you,  -Jyoti Lee OD

## 2020-11-06 NOTE — PROGRESS NOTES
HPI     TSEPHANIE: unk, possibly a year    C/o headaches, started when she got current pair of BFs  C/o dryness and irritation and itching, burning, seems to be associated   with chronic allergies  Using Visine PRN    NIDDM - unk when diagnosed, using Metformin and Sam  Does know last BS numbers    Hemoglobin A1C (%)       Date                     Value                 09/24/2020               6.5 (H)               03/17/2019               6.3 (H)               03/17/2019               6.3 (H)          ----------       Last edited by Jyoti Lee, OD on 11/6/2020  2:01 PM. (History)         Assessment & Plan:  1. Diabetes mellitus type 2 without retinopathy  2. Diabetic eye exam  - Ambulatory referral/consult to Optometry  No Diabetic Retinopathy noted today. No CSME or gus. Monitor in 1 year with a dilated fundus exam.  Pt to continue blood sugar control with primary care physician. Contacted PCP with results of today's exam.     3. Nuclear sclerotic cataract, bilateral  Educated patient on presence and severity of cataracts, monitor yearly with dilated exam.     4. Hyperopia of both eyes with regular astigmatism and presbyopia  Updated glasses prescription given: Bifocals and PALs. Monitor yearly.   Eyeglass Final Rx     Eyeglass Final Rx       Sphere Cylinder [place] Dist VA Add Near VA    Right +2.00 Sphere  20/25 +2.50 J1    Left +2.00 +0.50 019 20/25 +2.50 J1    Type: PAL    Expiration Date: 11/7/2021    Comments: Patient may fill glasses as Bifocals or PALs                 5. Chronic allergic conjunctivitis  6. Meibomian gland dysfunction (MGD) of both eyes  Meibomian gland dysfunction and Chronic ocular allergies as primary cause of blurry vision, itching and discomfort complaints  Pt instructed to use OTC Artificial tears as needed (recommended Blink, Systane, or Refresh), use warm compresses daily (recommended Tania mask, Thermalon, TheraPearl) and Use over the counter anti-histamine drops (recommended [place],  Nel Macario)  Monitor yearly

## 2020-12-16 ENCOUNTER — OFFICE VISIT (OUTPATIENT)
Dept: INTERNAL MEDICINE | Facility: CLINIC | Age: 65
End: 2020-12-16
Payer: MEDICARE

## 2020-12-16 ENCOUNTER — HOSPITAL ENCOUNTER (OUTPATIENT)
Dept: RADIOLOGY | Facility: HOSPITAL | Age: 65
Discharge: HOME | End: 2020-12-16
Attending: HOSPITALIST
Payer: MEDICARE

## 2020-12-16 VITALS
RESPIRATION RATE: 16 BRPM | HEIGHT: 60 IN | BODY MASS INDEX: 47.61 KG/M2 | TEMPERATURE: 98 F | DIASTOLIC BLOOD PRESSURE: 60 MMHG | SYSTOLIC BLOOD PRESSURE: 118 MMHG | HEART RATE: 61 BPM | WEIGHT: 242.5 LBS

## 2020-12-16 DIAGNOSIS — M72.2 PLANTAR FASCIITIS: ICD-10-CM

## 2020-12-16 DIAGNOSIS — M25.562 ACUTE PAIN OF LEFT KNEE: ICD-10-CM

## 2020-12-16 DIAGNOSIS — M54.31 SCIATICA OF RIGHT SIDE: Primary | ICD-10-CM

## 2020-12-16 DIAGNOSIS — E11.9 ENCOUNTER FOR DIABETIC FOOT EXAM: ICD-10-CM

## 2020-12-16 PROCEDURE — 3078F DIAST BP <80 MM HG: CPT | Mod: CPTII,S$GLB,, | Performed by: HOSPITALIST

## 2020-12-16 PROCEDURE — 99999 PR PBB SHADOW E&M-EST. PATIENT-LVL V: ICD-10-PCS | Mod: PBBFAC,,, | Performed by: HOSPITALIST

## 2020-12-16 PROCEDURE — 99214 OFFICE O/P EST MOD 30 MIN: CPT | Mod: 25,S$GLB,, | Performed by: HOSPITALIST

## 2020-12-16 PROCEDURE — 3078F PR MOST RECENT DIASTOLIC BLOOD PRESSURE < 80 MM HG: ICD-10-PCS | Mod: CPTII,S$GLB,, | Performed by: HOSPITALIST

## 2020-12-16 PROCEDURE — 73562 X-RAY EXAM OF KNEE 3: CPT | Mod: 26,LT,, | Performed by: RADIOLOGY

## 2020-12-16 PROCEDURE — 1126F AMNT PAIN NOTED NONE PRSNT: CPT | Mod: S$GLB,,, | Performed by: HOSPITALIST

## 2020-12-16 PROCEDURE — 73562 XR KNEE 3 VIEW LEFT: ICD-10-PCS | Mod: 26,LT,, | Performed by: RADIOLOGY

## 2020-12-16 PROCEDURE — 3288F FALL RISK ASSESSMENT DOCD: CPT | Mod: CPTII,S$GLB,, | Performed by: HOSPITALIST

## 2020-12-16 PROCEDURE — 3008F PR BODY MASS INDEX (BMI) DOCUMENTED: ICD-10-PCS | Mod: CPTII,S$GLB,, | Performed by: HOSPITALIST

## 2020-12-16 PROCEDURE — 3288F PR FALLS RISK ASSESSMENT DOCUMENTED: ICD-10-PCS | Mod: CPTII,S$GLB,, | Performed by: HOSPITALIST

## 2020-12-16 PROCEDURE — 3074F SYST BP LT 130 MM HG: CPT | Mod: CPTII,S$GLB,, | Performed by: HOSPITALIST

## 2020-12-16 PROCEDURE — 3044F PR MOST RECENT HEMOGLOBIN A1C LEVEL <7.0%: ICD-10-PCS | Mod: CPTII,S$GLB,, | Performed by: HOSPITALIST

## 2020-12-16 PROCEDURE — 1101F PT FALLS ASSESS-DOCD LE1/YR: CPT | Mod: CPTII,S$GLB,, | Performed by: HOSPITALIST

## 2020-12-16 PROCEDURE — 3074F PR MOST RECENT SYSTOLIC BLOOD PRESSURE < 130 MM HG: ICD-10-PCS | Mod: CPTII,S$GLB,, | Performed by: HOSPITALIST

## 2020-12-16 PROCEDURE — 99214 PR OFFICE/OUTPT VISIT, EST, LEVL IV, 30-39 MIN: ICD-10-PCS | Mod: 25,S$GLB,, | Performed by: HOSPITALIST

## 2020-12-16 PROCEDURE — 73562 X-RAY EXAM OF KNEE 3: CPT | Mod: TC,PO,LT

## 2020-12-16 PROCEDURE — 1101F PR PT FALLS ASSESS DOC 0-1 FALLS W/OUT INJ PAST YR: ICD-10-PCS | Mod: CPTII,S$GLB,, | Performed by: HOSPITALIST

## 2020-12-16 PROCEDURE — 99999 PR PBB SHADOW E&M-EST. PATIENT-LVL V: CPT | Mod: PBBFAC,,, | Performed by: HOSPITALIST

## 2020-12-16 PROCEDURE — 3008F BODY MASS INDEX DOCD: CPT | Mod: CPTII,S$GLB,, | Performed by: HOSPITALIST

## 2020-12-16 PROCEDURE — 96372 THER/PROPH/DIAG INJ SC/IM: CPT | Mod: S$GLB,,, | Performed by: HOSPITALIST

## 2020-12-16 PROCEDURE — 96372 PR INJECTION,THERAP/PROPH/DIAG2ST, IM OR SUBCUT: ICD-10-PCS | Mod: S$GLB,,, | Performed by: HOSPITALIST

## 2020-12-16 PROCEDURE — 1126F PR PAIN SEVERITY QUANTIFIED, NO PAIN PRESENT: ICD-10-PCS | Mod: S$GLB,,, | Performed by: HOSPITALIST

## 2020-12-16 PROCEDURE — 3044F HG A1C LEVEL LT 7.0%: CPT | Mod: CPTII,S$GLB,, | Performed by: HOSPITALIST

## 2020-12-16 RX ORDER — KETOROLAC TROMETHAMINE 30 MG/ML
30 INJECTION, SOLUTION INTRAMUSCULAR; INTRAVENOUS
Status: COMPLETED | OUTPATIENT
Start: 2020-12-16 | End: 2020-12-16

## 2020-12-16 RX ADMIN — KETOROLAC TROMETHAMINE 30 MG: 30 INJECTION, SOLUTION INTRAMUSCULAR; INTRAVENOUS at 09:12

## 2020-12-16 NOTE — PROGRESS NOTES
Subjective:     @Patient ID: Christie Damian is a 65 y.o. female.    Chief Complaint: Leg Pain    HPI  64 yo F with dm2, htn presents for urgent visit with 1 week hx with R sided sciatica. Has tried tylenol. Reports goes down the back of her leg. Reports 10/10 in severity.  Also c/o l knee pain x 1 week. Denies trauma/falls. Reports sitting more for work. Pain makes it hard to ambulate. Only tried tylenol. Also already on voltaren, gabapentin and flexeril    Review of Systems   Constitutional: Negative for chills and fever.   HENT: Negative for congestion and sore throat.    Eyes: Negative for pain and visual disturbance.   Respiratory: Negative for cough and shortness of breath.    Cardiovascular: Negative for chest pain and leg swelling.   Gastrointestinal: Negative for abdominal pain, nausea and vomiting.   Endocrine: Negative for polydipsia and polyuria.   Genitourinary: Negative for difficulty urinating and dysuria.   Musculoskeletal: Positive for arthralgias and gait problem.   Skin: Negative for rash and wound.   Neurological: Negative for dizziness, weakness and headaches.   Psychiatric/Behavioral: Negative for agitation and confusion.     Past medical history, surgical history, and family medical history reviewed and updated as appropriate.    Medications and allergies reviewed.     Objective:     Vitals:    12/16/20 0900   BP: 118/60   Pulse: 61   Resp: 16   Temp: 97.9 °F (36.6 °C)   TempSrc: Oral   Weight: 110 kg (242 lb 8.1 oz)   Height: 5' (1.524 m)     Body mass index is 47.36 kg/m².  Physical Exam  Constitutional:       Appearance: Normal appearance.   HENT:      Head: Normocephalic and atraumatic.   Eyes:      General:         Right eye: No discharge.         Left eye: No discharge.   Neck:      Musculoskeletal: Normal range of motion and neck supple.   Cardiovascular:      Rate and Rhythm: Normal rate and regular rhythm.      Heart sounds: No murmur.   Pulmonary:      Effort: Pulmonary effort is  normal.      Breath sounds: Normal breath sounds.   Musculoskeletal: Normal range of motion.         General: Tenderness (left knee ) present. No swelling.   Skin:     General: Skin is warm and dry.   Neurological:      Mental Status: She is alert and oriented to person, place, and time.   Psychiatric:         Mood and Affect: Mood normal.         Behavior: Behavior normal.         Lab Results   Component Value Date    WBC 6.77 09/24/2020    HGB 12.7 09/24/2020    HCT 42.5 09/24/2020     09/24/2020    CHOL 188 09/24/2020    TRIG 231 (H) 09/24/2020    HDL 53 09/24/2020    ALT 34 09/24/2020    AST 20 09/24/2020     09/24/2020    K 4.2 09/24/2020     09/24/2020    CREATININE 0.8 09/24/2020    BUN 17 09/24/2020    CO2 24 09/24/2020    TSH 1.986 09/24/2020    INR 1.25 (H) 04/25/2016    HGBA1C 6.5 (H) 09/24/2020       Assessment:     1. Sciatica of right side    2. Acute pain of left knee    3. Encounter for diabetic foot exam    4. Plantar fasciitis      Plan:   Christie was seen today for leg pain.    Diagnoses and all orders for this visit:    Sciatica of right side         - Will give toradol. Counseled ok to try increasing gabapentin to 2 capsules tidprn. Notify MD if no improvement     Acute pain of left knee  -     X-Ray Knee 3 View Left; Future    Encounter for diabetic foot exam  -     Ambulatory referral/consult to Podiatry; Future    Plantar fasciitis  -     Ambulatory referral/consult to Podiatry; Future    Other orders  -     ketorolac injection 30 mg      RTC prn    Michelle Nieves MD  Internal Medicine    12/16/2020

## 2020-12-18 ENCOUNTER — TELEPHONE (OUTPATIENT)
Dept: INTERNAL MEDICINE | Facility: CLINIC | Age: 65
End: 2020-12-18

## 2020-12-18 NOTE — TELEPHONE ENCOUNTER
----- Message from Michelle Nieves MD sent at 12/18/2020  7:52 AM CST -----  Please notify pt that knee xray shows mild arthritis and bone spurs.

## 2020-12-23 ENCOUNTER — PATIENT OUTREACH (OUTPATIENT)
Dept: ADMINISTRATIVE | Facility: OTHER | Age: 65
End: 2020-12-23

## 2020-12-23 ENCOUNTER — TELEPHONE (OUTPATIENT)
Dept: INTERNAL MEDICINE | Facility: CLINIC | Age: 65
End: 2020-12-23

## 2020-12-23 NOTE — PROGRESS NOTES
LINKS immunization registry not responding  Care Everywhere updated  Health Maintenance updated  Chart reviewed for overdue Proactive [facility] ([facility]) health maintenance testing (CRS, Breast Ca, Diabetic Eye Exam)   Orders entered:N/A

## 2020-12-23 NOTE — TELEPHONE ENCOUNTER
----- Message from Jennifer Borrero sent at 12/23/2020  1:56 PM CST -----  Contact: pt [phone]  Stated that she was treated for sciatic pain and the pain has moved to her calf.    Please call and advise.    Thank You

## 2020-12-24 ENCOUNTER — HOSPITAL ENCOUNTER (EMERGENCY)
Facility: HOSPITAL | Age: 65
Discharge: HOME | End: 2020-12-24
Attending: EMERGENCY MEDICINE
Payer: MEDICARE

## 2020-12-24 ENCOUNTER — OFFICE VISIT (OUTPATIENT)
Dept: INTERNAL MEDICINE | Facility: CLINIC | Age: 65
End: 2020-12-24
Payer: MEDICARE

## 2020-12-24 VITALS
TEMPERATURE: 98 F | RESPIRATION RATE: 18 BRPM | DIASTOLIC BLOOD PRESSURE: 82 MMHG | SYSTOLIC BLOOD PRESSURE: 168 MMHG | HEART RATE: 70 BPM | OXYGEN SATURATION: 100 %

## 2020-12-24 VITALS
WEIGHT: 248.88 LBS | TEMPERATURE: 98 F | SYSTOLIC BLOOD PRESSURE: 112 MMHG | OXYGEN SATURATION: 94 % | HEIGHT: 60 IN | BODY MASS INDEX: 48.86 KG/M2 | DIASTOLIC BLOOD PRESSURE: 60 MMHG | HEART RATE: 67 BPM | RESPIRATION RATE: 16 BRPM

## 2020-12-24 DIAGNOSIS — R06.02 SOB (SHORTNESS OF BREATH): ICD-10-CM

## 2020-12-24 DIAGNOSIS — M79.661 RIGHT CALF PAIN: Primary | ICD-10-CM

## 2020-12-24 DIAGNOSIS — M79.604 RIGHT LEG PAIN: ICD-10-CM

## 2020-12-24 PROCEDURE — 3074F SYST BP LT 130 MM HG: CPT | Mod: CPTII,S$GLB,, | Performed by: HOSPITALIST

## 2020-12-24 PROCEDURE — 1125F PR PAIN SEVERITY QUANTIFIED, PAIN PRESENT: ICD-10-PCS | Mod: S$GLB,,, | Performed by: HOSPITALIST

## 2020-12-24 PROCEDURE — 3074F PR MOST RECENT SYSTOLIC BLOOD PRESSURE < 130 MM HG: ICD-10-PCS | Mod: CPTII,S$GLB,, | Performed by: HOSPITALIST

## 2020-12-24 PROCEDURE — 3288F FALL RISK ASSESSMENT DOCD: CPT | Mod: CPTII,S$GLB,, | Performed by: HOSPITALIST

## 2020-12-24 PROCEDURE — 99999 PR PBB SHADOW E&M-EST. PATIENT-LVL IV: ICD-10-PCS | Mod: PBBFAC,,, | Performed by: HOSPITALIST

## 2020-12-24 PROCEDURE — 3078F DIAST BP <80 MM HG: CPT | Mod: CPTII,S$GLB,, | Performed by: HOSPITALIST

## 2020-12-24 PROCEDURE — 3078F PR MOST RECENT DIASTOLIC BLOOD PRESSURE < 80 MM HG: ICD-10-PCS | Mod: CPTII,S$GLB,, | Performed by: HOSPITALIST

## 2020-12-24 PROCEDURE — 1125F AMNT PAIN NOTED PAIN PRSNT: CPT | Mod: S$GLB,,, | Performed by: HOSPITALIST

## 2020-12-24 PROCEDURE — 1101F PR PT FALLS ASSESS DOC 0-1 FALLS W/OUT INJ PAST YR: ICD-10-PCS | Mod: CPTII,S$GLB,, | Performed by: HOSPITALIST

## 2020-12-24 PROCEDURE — 99284 EMERGENCY DEPT VISIT MOD MDM: CPT | Mod: 25

## 2020-12-24 PROCEDURE — 3008F BODY MASS INDEX DOCD: CPT | Mod: CPTII,S$GLB,, | Performed by: HOSPITALIST

## 2020-12-24 PROCEDURE — 99213 PR OFFICE/OUTPT VISIT, EST, LEVL III, 20-29 MIN: ICD-10-PCS | Mod: S$GLB,,, | Performed by: HOSPITALIST

## 2020-12-24 PROCEDURE — 99213 OFFICE O/P EST LOW 20 MIN: CPT | Mod: S$GLB,,, | Performed by: HOSPITALIST

## 2020-12-24 PROCEDURE — 3288F PR FALLS RISK ASSESSMENT DOCUMENTED: ICD-10-PCS | Mod: CPTII,S$GLB,, | Performed by: HOSPITALIST

## 2020-12-24 PROCEDURE — 99999 PR PBB SHADOW E&M-EST. PATIENT-LVL IV: CPT | Mod: PBBFAC,,, | Performed by: HOSPITALIST

## 2020-12-24 PROCEDURE — 1101F PT FALLS ASSESS-DOCD LE1/YR: CPT | Mod: CPTII,S$GLB,, | Performed by: HOSPITALIST

## 2020-12-24 PROCEDURE — 3008F PR BODY MASS INDEX (BMI) DOCUMENTED: ICD-10-PCS | Mod: CPTII,S$GLB,, | Performed by: HOSPITALIST

## 2020-12-24 RX ORDER — DICLOFENAC SODIUM 10 MG/G
2 GEL TOPICAL DAILY
Qty: 1 TUBE | Refills: 0 | Status: SHIPPED | OUTPATIENT
Start: 2020-12-24 | End: 2022-06-16

## 2020-12-24 NOTE — PROGRESS NOTES
Subjective:     @Patient ID: Christie Damian is a 65 y.o. female.    Chief Complaint: Pain (Right Calve ) and Shortness of Breath    HPI  64 yo F presents for urgent visit with calf pain of R leg for the past 2-3 days and now with sob today. Reports after last week in clinic reports developed significant pain of right calf and swelling. States sx not improving. Having difficulty ambulating due to this. She also reports today having mild sob        Review of Systems   Constitutional: Negative for chills and fever.   HENT: Negative for congestion and sore throat.    Eyes: Negative for pain and visual disturbance.   Respiratory: Positive for shortness of breath. Negative for cough.    Cardiovascular: Positive for leg swelling. Negative for chest pain.   Gastrointestinal: Negative for abdominal pain, nausea and vomiting.   Genitourinary: Negative for difficulty urinating and dysuria.   Musculoskeletal: Negative for arthralgias and back pain.        +calf pain   Neurological: Negative for dizziness, weakness and headaches.   Psychiatric/Behavioral: Negative for agitation and confusion.     Past medical history, surgical history, and family medical history reviewed and updated as appropriate.    Medications and allergies reviewed.     Objective:     There were no vitals filed for this visit.  There is no height or weight on file to calculate BMI.  Physical Exam  Constitutional:       Appearance: Normal appearance.   HENT:      Head: Normocephalic and atraumatic.   Eyes:      General:         Right eye: No discharge.         Left eye: No discharge.      Conjunctiva/sclera: Conjunctivae normal.   Cardiovascular:      Rate and Rhythm: Normal rate and regular rhythm.   Pulmonary:      Effort: Pulmonary effort is normal. No respiratory distress.      Breath sounds: Normal breath sounds. No wheezing.   Musculoskeletal:      Right lower leg: Edema present.      Left lower leg: Edema present.      Comments: RLE > LLE edema. +  tenderness on posterior R calf.  +luis felipe's sign   Neurological:      Mental Status: She is alert and oriented to person, place, and time.   Psychiatric:         Mood and Affect: Mood normal.         Behavior: Behavior normal.         Lab Results   Component Value Date    WBC 6.77 09/24/2020    HGB 12.7 09/24/2020    HCT 42.5 09/24/2020     09/24/2020    CHOL 188 09/24/2020    TRIG 231 (H) 09/24/2020    HDL 53 09/24/2020    ALT 34 09/24/2020    AST 20 09/24/2020     09/24/2020    K 4.2 09/24/2020     09/24/2020    CREATININE 0.8 09/24/2020    BUN 17 09/24/2020    CO2 24 09/24/2020    TSH 1.986 09/24/2020    INR 1.25 (H) 04/25/2016    HGBA1C 6.5 (H) 09/24/2020       Assessment:     1. Right calf pain    2. SOB (shortness of breath)      Plan:   Christie was seen today for pain and shortness of breath.    Diagnoses and all orders for this visit:    Calf pain, right  Based on sx concern for possible DVT/PE. I recommend ER evaluation now. Pt's daughter will  her to the ER. Will call report to [facility].     SOB (shortness of breath)  - see above            No follow-ups on file.    Michelle Nieves MD  Internal Medicine    12/24/2020

## 2020-12-24 NOTE — ED NOTES
Patient advises she is having pain to posterior right lower leg that began on Dec. 14th. The patient states she is now having difficulty ambulating. Patient's PCP sent the patient from her office for evaluation of possible DVT. The patient rates pain 9/10 at rest and 10/10 while ambulating. Patient experienced dyspnea with exertion earlier today but advises breathing is now normal. She is awake/alert and in no distress at this time. She is currently taking asa daily but denies blood thinners.

## 2020-12-30 ENCOUNTER — OFFICE VISIT (OUTPATIENT)
Dept: PODIATRY | Facility: CLINIC | Age: 65
End: 2020-12-30
Payer: MEDICARE

## 2020-12-30 VITALS
SYSTOLIC BLOOD PRESSURE: 164 MMHG | WEIGHT: 248.88 LBS | HEART RATE: 81 BPM | DIASTOLIC BLOOD PRESSURE: 77 MMHG | BODY MASS INDEX: 48.61 KG/M2

## 2020-12-30 DIAGNOSIS — E11.9 ENCOUNTER FOR DIABETIC FOOT EXAM: ICD-10-CM

## 2020-12-30 DIAGNOSIS — M72.2 PLANTAR FASCIITIS: ICD-10-CM

## 2020-12-30 DIAGNOSIS — M25.561 KNEE PAIN, RIGHT ANTERIOR: Primary | ICD-10-CM

## 2020-12-30 PROCEDURE — 3044F PR MOST RECENT HEMOGLOBIN A1C LEVEL <7.0%: ICD-10-PCS | Mod: CPTII,S$GLB,, | Performed by: PODIATRIST

## 2020-12-30 PROCEDURE — 3008F BODY MASS INDEX DOCD: CPT | Mod: CPTII,S$GLB,, | Performed by: PODIATRIST

## 2020-12-30 PROCEDURE — 3008F PR BODY MASS INDEX (BMI) DOCUMENTED: ICD-10-PCS | Mod: CPTII,S$GLB,, | Performed by: PODIATRIST

## 2020-12-30 PROCEDURE — 20550 NJX 1 TENDON SHEATH/LIGAMENT: CPT | Mod: LT,S$GLB,, | Performed by: PODIATRIST

## 2020-12-30 PROCEDURE — 3077F SYST BP >= 140 MM HG: CPT | Mod: CPTII,S$GLB,, | Performed by: PODIATRIST

## 2020-12-30 PROCEDURE — 3072F PR LOW RISK FOR RETINOPATHY: ICD-10-PCS | Mod: S$GLB,,, | Performed by: PODIATRIST

## 2020-12-30 PROCEDURE — 3078F DIAST BP <80 MM HG: CPT | Mod: CPTII,S$GLB,, | Performed by: PODIATRIST

## 2020-12-30 PROCEDURE — 99999 PR PBB SHADOW E&M-EST. PATIENT-LVL IV: CPT | Mod: PBBFAC,,, | Performed by: PODIATRIST

## 2020-12-30 PROCEDURE — 99999 PR PBB SHADOW E&M-EST. PATIENT-LVL IV: ICD-10-PCS | Mod: PBBFAC,,, | Performed by: PODIATRIST

## 2020-12-30 PROCEDURE — 99203 PR OFFICE/OUTPT VISIT, NEW, LEVL III, 30-44 MIN: ICD-10-PCS | Mod: 25,S$GLB,, | Performed by: PODIATRIST

## 2020-12-30 PROCEDURE — 3044F HG A1C LEVEL LT 7.0%: CPT | Mod: CPTII,S$GLB,, | Performed by: PODIATRIST

## 2020-12-30 PROCEDURE — 20550 PR INJECT TENDON SHEATH/LIGAMENT: ICD-10-PCS | Mod: LT,S$GLB,, | Performed by: PODIATRIST

## 2020-12-30 PROCEDURE — 1125F AMNT PAIN NOTED PAIN PRSNT: CPT | Mod: S$GLB,,, | Performed by: PODIATRIST

## 2020-12-30 PROCEDURE — 3072F LOW RISK FOR RETINOPATHY: CPT | Mod: S$GLB,,, | Performed by: PODIATRIST

## 2020-12-30 PROCEDURE — 1125F PR PAIN SEVERITY QUANTIFIED, PAIN PRESENT: ICD-10-PCS | Mod: S$GLB,,, | Performed by: PODIATRIST

## 2020-12-30 PROCEDURE — 99203 OFFICE O/P NEW LOW 30 MIN: CPT | Mod: 25,S$GLB,, | Performed by: PODIATRIST

## 2020-12-30 PROCEDURE — 3077F PR MOST RECENT SYSTOLIC BLOOD PRESSURE >= 140 MM HG: ICD-10-PCS | Mod: CPTII,S$GLB,, | Performed by: PODIATRIST

## 2020-12-30 PROCEDURE — 3078F PR MOST RECENT DIASTOLIC BLOOD PRESSURE < 80 MM HG: ICD-10-PCS | Mod: CPTII,S$GLB,, | Performed by: PODIATRIST

## 2020-12-30 RX ORDER — DICLOFENAC SODIUM 10 MG/G
2 GEL TOPICAL 4 TIMES DAILY
Qty: 1 TUBE | Refills: 2 | Status: SHIPPED | OUTPATIENT
Start: 2020-12-30 | End: 2022-06-16

## 2020-12-30 RX ORDER — TRIAMCINOLONE ACETONIDE 40 MG/ML
40 INJECTION, SUSPENSION INTRA-ARTICULAR; INTRAMUSCULAR
Status: COMPLETED | OUTPATIENT
Start: 2020-12-30 | End: 2020-12-30

## 2020-12-30 RX ADMIN — TRIAMCINOLONE ACETONIDE 40 MG: 40 INJECTION, SUSPENSION INTRA-ARTICULAR; INTRAMUSCULAR at 05:12

## 2021-01-04 ENCOUNTER — TELEPHONE (OUTPATIENT)
Dept: ORTHOPEDICS | Facility: CLINIC | Age: 66
End: 2021-01-04

## 2021-01-07 ENCOUNTER — OFFICE VISIT (OUTPATIENT)
Dept: ORTHOPEDICS | Facility: CLINIC | Age: 66
End: 2021-01-07
Payer: MEDICARE

## 2021-01-07 ENCOUNTER — HOSPITAL ENCOUNTER (OUTPATIENT)
Dept: RADIOLOGY | Facility: HOSPITAL | Age: 66
Discharge: HOME | End: 2021-01-07
Attending: PHYSICIAN ASSISTANT
Payer: MEDICARE

## 2021-01-07 VITALS — HEIGHT: 60 IN | BODY MASS INDEX: 48.61 KG/M2

## 2021-01-07 DIAGNOSIS — M17.11 PRIMARY OSTEOARTHRITIS OF RIGHT KNEE: ICD-10-CM

## 2021-01-07 DIAGNOSIS — M25.561 KNEE PAIN, RIGHT ANTERIOR: ICD-10-CM

## 2021-01-07 PROCEDURE — 1101F PR PT FALLS ASSESS DOC 0-1 FALLS W/OUT INJ PAST YR: ICD-10-PCS | Mod: CPTII,S$GLB,, | Performed by: PHYSICIAN ASSISTANT

## 2021-01-07 PROCEDURE — 99999 PR PBB SHADOW E&M-EST. PATIENT-LVL IV: CPT | Mod: PBBFAC,,, | Performed by: PHYSICIAN ASSISTANT

## 2021-01-07 PROCEDURE — 99203 PR OFFICE/OUTPT VISIT, NEW, LEVL III, 30-44 MIN: ICD-10-PCS | Mod: 25,S$GLB,, | Performed by: PHYSICIAN ASSISTANT

## 2021-01-07 PROCEDURE — 73564 X-RAY EXAM KNEE 4 OR MORE: CPT | Mod: TC,RT

## 2021-01-07 PROCEDURE — 3072F PR LOW RISK FOR RETINOPATHY: ICD-10-PCS | Mod: S$GLB,,, | Performed by: PHYSICIAN ASSISTANT

## 2021-01-07 PROCEDURE — 73562 XR KNEE ORTHO RIGHT WITH FLEXION: ICD-10-PCS | Mod: 26,LT,, | Performed by: RADIOLOGY

## 2021-01-07 PROCEDURE — 3288F PR FALLS RISK ASSESSMENT DOCUMENTED: ICD-10-PCS | Mod: CPTII,S$GLB,, | Performed by: PHYSICIAN ASSISTANT

## 2021-01-07 PROCEDURE — 1125F AMNT PAIN NOTED PAIN PRSNT: CPT | Mod: S$GLB,,, | Performed by: PHYSICIAN ASSISTANT

## 2021-01-07 PROCEDURE — 1101F PT FALLS ASSESS-DOCD LE1/YR: CPT | Mod: CPTII,S$GLB,, | Performed by: PHYSICIAN ASSISTANT

## 2021-01-07 PROCEDURE — 73562 X-RAY EXAM OF KNEE 3: CPT | Mod: 26,LT,, | Performed by: RADIOLOGY

## 2021-01-07 PROCEDURE — 20610 DRAIN/INJ JOINT/BURSA W/O US: CPT | Mod: RT,S$GLB,, | Performed by: PHYSICIAN ASSISTANT

## 2021-01-07 PROCEDURE — 20610 PR DRAIN/INJECT LARGE JOINT/BURSA: ICD-10-PCS | Mod: RT,S$GLB,, | Performed by: PHYSICIAN ASSISTANT

## 2021-01-07 PROCEDURE — 99203 OFFICE O/P NEW LOW 30 MIN: CPT | Mod: 25,S$GLB,, | Performed by: PHYSICIAN ASSISTANT

## 2021-01-07 PROCEDURE — 3072F LOW RISK FOR RETINOPATHY: CPT | Mod: S$GLB,,, | Performed by: PHYSICIAN ASSISTANT

## 2021-01-07 PROCEDURE — 3288F FALL RISK ASSESSMENT DOCD: CPT | Mod: CPTII,S$GLB,, | Performed by: PHYSICIAN ASSISTANT

## 2021-01-07 PROCEDURE — 99999 PR PBB SHADOW E&M-EST. PATIENT-LVL IV: ICD-10-PCS | Mod: PBBFAC,,, | Performed by: PHYSICIAN ASSISTANT

## 2021-01-07 PROCEDURE — 73564 XR KNEE ORTHO RIGHT WITH FLEXION: ICD-10-PCS | Mod: 26,RT,, | Performed by: RADIOLOGY

## 2021-01-07 PROCEDURE — 3008F PR BODY MASS INDEX (BMI) DOCUMENTED: ICD-10-PCS | Mod: CPTII,S$GLB,, | Performed by: PHYSICIAN ASSISTANT

## 2021-01-07 PROCEDURE — 73564 X-RAY EXAM KNEE 4 OR MORE: CPT | Mod: 26,RT,, | Performed by: RADIOLOGY

## 2021-01-07 PROCEDURE — 1125F PR PAIN SEVERITY QUANTIFIED, PAIN PRESENT: ICD-10-PCS | Mod: S$GLB,,, | Performed by: PHYSICIAN ASSISTANT

## 2021-01-07 PROCEDURE — 3008F BODY MASS INDEX DOCD: CPT | Mod: CPTII,S$GLB,, | Performed by: PHYSICIAN ASSISTANT

## 2021-01-07 RX ORDER — METHYLPREDNISOLONE ACETATE 80 MG/ML
80 INJECTION, SUSPENSION INTRA-ARTICULAR; INTRALESIONAL; INTRAMUSCULAR; SOFT TISSUE
Status: COMPLETED | OUTPATIENT
Start: 2021-01-07 | End: 2021-01-07

## 2021-01-07 RX ORDER — MELOXICAM 15 MG/1
15 TABLET ORAL DAILY
Qty: 14 TABLET | Refills: 0 | Status: SHIPPED | OUTPATIENT
Start: 2021-01-07 | End: 2021-02-06

## 2021-01-07 RX ADMIN — METHYLPREDNISOLONE ACETATE 80 MG: 80 INJECTION, SUSPENSION INTRA-ARTICULAR; INTRALESIONAL; INTRAMUSCULAR; SOFT TISSUE at 03:01

## 2021-01-20 ENCOUNTER — OFFICE VISIT (OUTPATIENT)
Dept: PODIATRY | Facility: CLINIC | Age: 66
End: 2021-01-20
Payer: MEDICARE

## 2021-01-20 VITALS
BODY MASS INDEX: 48.61 KG/M2 | WEIGHT: 248.88 LBS | DIASTOLIC BLOOD PRESSURE: 68 MMHG | SYSTOLIC BLOOD PRESSURE: 129 MMHG | HEART RATE: 77 BPM

## 2021-01-20 DIAGNOSIS — E11.9 ENCOUNTER FOR DIABETIC FOOT EXAM: ICD-10-CM

## 2021-01-20 DIAGNOSIS — M72.2 PLANTAR FASCIITIS: Primary | ICD-10-CM

## 2021-01-20 PROCEDURE — 3078F DIAST BP <80 MM HG: CPT | Mod: CPTII,S$GLB,, | Performed by: PODIATRIST

## 2021-01-20 PROCEDURE — 3044F HG A1C LEVEL LT 7.0%: CPT | Mod: CPTII,S$GLB,, | Performed by: PODIATRIST

## 2021-01-20 PROCEDURE — 3008F BODY MASS INDEX DOCD: CPT | Mod: CPTII,S$GLB,, | Performed by: PODIATRIST

## 2021-01-20 PROCEDURE — 3074F SYST BP LT 130 MM HG: CPT | Mod: CPTII,S$GLB,, | Performed by: PODIATRIST

## 2021-01-20 PROCEDURE — 99999 PR PBB SHADOW E&M-EST. PATIENT-LVL III: ICD-10-PCS | Mod: PBBFAC,,, | Performed by: PODIATRIST

## 2021-01-20 PROCEDURE — 3074F PR MOST RECENT SYSTOLIC BLOOD PRESSURE < 130 MM HG: ICD-10-PCS | Mod: CPTII,S$GLB,, | Performed by: PODIATRIST

## 2021-01-20 PROCEDURE — 3072F LOW RISK FOR RETINOPATHY: CPT | Mod: S$GLB,,, | Performed by: PODIATRIST

## 2021-01-20 PROCEDURE — 99214 PR OFFICE/OUTPT VISIT, EST, LEVL IV, 30-39 MIN: ICD-10-PCS | Mod: S$GLB,,, | Performed by: PODIATRIST

## 2021-01-20 PROCEDURE — 3072F PR LOW RISK FOR RETINOPATHY: ICD-10-PCS | Mod: S$GLB,,, | Performed by: PODIATRIST

## 2021-01-20 PROCEDURE — 99214 OFFICE O/P EST MOD 30 MIN: CPT | Mod: S$GLB,,, | Performed by: PODIATRIST

## 2021-01-20 PROCEDURE — 3008F PR BODY MASS INDEX (BMI) DOCUMENTED: ICD-10-PCS | Mod: CPTII,S$GLB,, | Performed by: PODIATRIST

## 2021-01-20 PROCEDURE — 3078F PR MOST RECENT DIASTOLIC BLOOD PRESSURE < 80 MM HG: ICD-10-PCS | Mod: CPTII,S$GLB,, | Performed by: PODIATRIST

## 2021-01-20 PROCEDURE — 3044F PR MOST RECENT HEMOGLOBIN A1C LEVEL <7.0%: ICD-10-PCS | Mod: CPTII,S$GLB,, | Performed by: PODIATRIST

## 2021-01-20 PROCEDURE — 99999 PR PBB SHADOW E&M-EST. PATIENT-LVL III: CPT | Mod: PBBFAC,,, | Performed by: PODIATRIST

## 2021-02-18 ENCOUNTER — PATIENT OUTREACH (OUTPATIENT)
Dept: ADMINISTRATIVE | Facility: OTHER | Age: 66
End: 2021-02-18

## 2021-02-23 ENCOUNTER — OFFICE VISIT (OUTPATIENT)
Dept: PODIATRY | Facility: CLINIC | Age: 66
End: 2021-02-23

## 2021-02-23 ENCOUNTER — PATIENT OUTREACH (OUTPATIENT)
Dept: ADMINISTRATIVE | Facility: HOSPITAL | Age: 66
End: 2021-02-23

## 2021-02-23 VITALS
SYSTOLIC BLOOD PRESSURE: 118 MMHG | BODY MASS INDEX: 48.86 KG/M2 | WEIGHT: 248.88 LBS | HEIGHT: 60 IN | DIASTOLIC BLOOD PRESSURE: 66 MMHG | HEART RATE: 68 BPM

## 2021-02-23 DIAGNOSIS — M72.2 PLANTAR FASCIITIS: Primary | ICD-10-CM

## 2021-02-23 DIAGNOSIS — Z12.11 SCREENING FOR COLON CANCER: Primary | ICD-10-CM

## 2021-02-23 PROCEDURE — 99214 OFFICE O/P EST MOD 30 MIN: CPT | Mod: S$PBB,,, | Performed by: PODIATRIST

## 2021-02-23 PROCEDURE — 99214 PR OFFICE/OUTPT VISIT, EST, LEVL IV, 30-39 MIN: ICD-10-PCS | Mod: S$PBB,,, | Performed by: PODIATRIST

## 2021-02-23 PROCEDURE — 99999 PR PBB SHADOW E&M-EST. PATIENT-LVL IV: ICD-10-PCS | Mod: PBBFAC,,, | Performed by: PODIATRIST

## 2021-02-23 PROCEDURE — 99214 OFFICE O/P EST MOD 30 MIN: CPT | Mod: PBBFAC | Performed by: PODIATRIST

## 2021-02-23 PROCEDURE — 99999 PR PBB SHADOW E&M-EST. PATIENT-LVL IV: CPT | Mod: PBBFAC,,, | Performed by: PODIATRIST

## 2021-03-22 ENCOUNTER — PATIENT OUTREACH (OUTPATIENT)
Dept: ADMINISTRATIVE | Facility: OTHER | Age: 66
End: 2021-03-22

## 2021-03-22 DIAGNOSIS — E11.9 DIABETES MELLITUS WITHOUT COMPLICATION: ICD-10-CM

## 2021-03-22 DIAGNOSIS — Z12.31 BREAST CANCER SCREENING BY MAMMOGRAM: Primary | ICD-10-CM

## 2021-03-23 ENCOUNTER — OFFICE VISIT (OUTPATIENT)
Dept: PODIATRY | Facility: CLINIC | Age: 66
End: 2021-03-23

## 2021-03-23 VITALS
HEART RATE: 81 BPM | WEIGHT: 241.63 LBS | DIASTOLIC BLOOD PRESSURE: 75 MMHG | BODY MASS INDEX: 47.44 KG/M2 | SYSTOLIC BLOOD PRESSURE: 142 MMHG | HEIGHT: 60 IN

## 2021-03-23 DIAGNOSIS — M72.2 PLANTAR FASCIITIS: Primary | ICD-10-CM

## 2021-03-23 PROCEDURE — 99213 PR OFFICE/OUTPT VISIT, EST, LEVL III, 20-29 MIN: ICD-10-PCS | Mod: S$PBB,,, | Performed by: PODIATRIST

## 2021-03-23 PROCEDURE — 99214 OFFICE O/P EST MOD 30 MIN: CPT | Mod: PBBFAC | Performed by: PODIATRIST

## 2021-03-23 PROCEDURE — 99999 PR PBB SHADOW E&M-EST. PATIENT-LVL IV: ICD-10-PCS | Mod: PBBFAC,,, | Performed by: PODIATRIST

## 2021-03-23 PROCEDURE — 99213 OFFICE O/P EST LOW 20 MIN: CPT | Mod: S$PBB,,, | Performed by: PODIATRIST

## 2021-03-23 PROCEDURE — 99999 PR PBB SHADOW E&M-EST. PATIENT-LVL IV: CPT | Mod: PBBFAC,,, | Performed by: PODIATRIST

## 2021-03-23 RX ORDER — METHYLPREDNISOLONE 4 MG/1
TABLET ORAL
Qty: 1 PACKAGE | Refills: 1 | Status: SHIPPED | OUTPATIENT
Start: 2021-03-23 | End: 2021-04-13

## 2021-03-29 ENCOUNTER — APPOINTMENT (OUTPATIENT)
Dept: RADIOLOGY | Facility: HOSPITAL | Age: 66
End: 2021-03-29
Attending: EMERGENCY MEDICINE

## 2021-03-29 ENCOUNTER — HOSPITAL ENCOUNTER (EMERGENCY)
Facility: HOSPITAL | Age: 66
Discharge: HOME | End: 2021-03-29
Attending: EMERGENCY MEDICINE

## 2021-03-29 VITALS
SYSTOLIC BLOOD PRESSURE: 142 MMHG | WEIGHT: 240 LBS | DIASTOLIC BLOOD PRESSURE: 64 MMHG | TEMPERATURE: 99 F | HEIGHT: 60 IN | BODY MASS INDEX: 47.12 KG/M2 | RESPIRATION RATE: 18 BRPM | OXYGEN SATURATION: 97 % | HEART RATE: 68 BPM

## 2021-03-29 DIAGNOSIS — M25.561 CHRONIC PAIN OF RIGHT KNEE: Primary | ICD-10-CM

## 2021-03-29 DIAGNOSIS — G89.29 CHRONIC PAIN OF RIGHT KNEE: Primary | ICD-10-CM

## 2021-03-29 DIAGNOSIS — M76.61 RIGHT ACHILLES TENDINITIS: ICD-10-CM

## 2021-03-29 DIAGNOSIS — R52 PAIN: ICD-10-CM

## 2021-03-29 PROCEDURE — 25000003 PHARM REV CODE 250: Performed by: EMERGENCY MEDICINE

## 2021-03-29 PROCEDURE — 96372 THER/PROPH/DIAG INJ SC/IM: CPT

## 2021-03-29 PROCEDURE — 99284 EMERGENCY DEPT VISIT MOD MDM: CPT | Mod: 25

## 2021-03-29 PROCEDURE — 73610 X-RAY EXAM OF ANKLE: CPT | Mod: TC,FY,RT

## 2021-03-29 PROCEDURE — 73610 XR ANKLE COMPLETE 3 VIEW RIGHT: ICD-10-PCS | Mod: 26,RT,, | Performed by: RADIOLOGY

## 2021-03-29 PROCEDURE — 63600175 PHARM REV CODE 636 W HCPCS: Performed by: EMERGENCY MEDICINE

## 2021-03-29 PROCEDURE — 73610 X-RAY EXAM OF ANKLE: CPT | Mod: 26,RT,, | Performed by: RADIOLOGY

## 2021-03-29 RX ORDER — IBUPROFEN 600 MG/1
600 TABLET ORAL EVERY 6 HOURS PRN
Qty: 15 TABLET | Refills: 0 | Status: SHIPPED | OUTPATIENT
Start: 2021-03-29 | End: 2022-06-16

## 2021-03-29 RX ORDER — KETOROLAC TROMETHAMINE 30 MG/ML
30 INJECTION, SOLUTION INTRAMUSCULAR; INTRAVENOUS
Status: COMPLETED | OUTPATIENT
Start: 2021-03-29 | End: 2021-03-29

## 2021-03-29 RX ORDER — LIDOCAINE 50 MG/G
1 PATCH TOPICAL
Status: DISCONTINUED | OUTPATIENT
Start: 2021-03-29 | End: 2021-03-29 | Stop reason: HOSPADM

## 2021-03-29 RX ORDER — LIDOCAINE 50 MG/G
1 PATCH TOPICAL DAILY
Qty: 5 PATCH | Refills: 0 | Status: SHIPPED | OUTPATIENT
Start: 2021-03-29 | End: 2022-06-16

## 2021-03-29 RX ADMIN — LIDOCAINE 1 PATCH: 50 PATCH TOPICAL at 09:03

## 2021-03-29 RX ADMIN — KETOROLAC TROMETHAMINE 30 MG: 30 INJECTION, SOLUTION INTRAMUSCULAR; INTRAVENOUS at 09:03

## 2021-11-16 DIAGNOSIS — Z78.0 MENOPAUSE: Primary | ICD-10-CM

## 2022-06-16 ENCOUNTER — HOSPITAL ENCOUNTER (EMERGENCY)
Facility: HOSPITAL | Age: 67
Discharge: HOME | End: 2022-06-16
Attending: EMERGENCY MEDICINE
Payer: MEDICARE

## 2022-06-16 VITALS
WEIGHT: 225 LBS | SYSTOLIC BLOOD PRESSURE: 132 MMHG | TEMPERATURE: 98 F | RESPIRATION RATE: 20 BRPM | BODY MASS INDEX: 43.94 KG/M2 | OXYGEN SATURATION: 96 % | DIASTOLIC BLOOD PRESSURE: 62 MMHG | HEART RATE: 62 BPM

## 2022-06-16 DIAGNOSIS — I25.10 ATHEROSCLEROTIC HEART DISEASE OF NATIVE CORONARY ARTERY WITHOUT ANGINA PECTORIS: Primary | ICD-10-CM

## 2022-06-16 DIAGNOSIS — R06.02 SHORTNESS OF BREATH: ICD-10-CM

## 2022-06-16 DIAGNOSIS — R07.9 CHEST PAIN, UNSPECIFIED TYPE: Primary | ICD-10-CM

## 2022-06-16 DIAGNOSIS — E11.65 TYPE 2 DIABETES MELLITUS WITH HYPERGLYCEMIA, UNSPECIFIED WHETHER LONG TERM INSULIN USE: ICD-10-CM

## 2022-06-16 LAB
ALBUMIN SERPL BCP-MCNC: 4.3 G/DL (ref 3.5–5.2)
ALP SERPL-CCNC: 78 U/L (ref 55–135)
ALT SERPL W/O P-5'-P-CCNC: 21 U/L (ref 10–44)
ANION GAP SERPL CALC-SCNC: 17 MMOL/L (ref 8–16)
AST SERPL-CCNC: 16 U/L (ref 10–40)
BASOPHILS # BLD AUTO: 0.04 K/UL (ref 0–0.2)
BASOPHILS NFR BLD: 0.7 % (ref 0–1.9)
BILIRUB SERPL-MCNC: 0.4 MG/DL (ref 0.1–1)
BNP SERPL-MCNC: <10 PG/ML (ref 0–99)
BUN SERPL-MCNC: 27 MG/DL (ref 8–23)
CALCIUM SERPL-MCNC: 9.8 MG/DL (ref 8.7–10.5)
CHLORIDE SERPL-SCNC: 102 MMOL/L (ref 95–110)
CO2 SERPL-SCNC: 21 MMOL/L (ref 23–29)
CREAT SERPL-MCNC: 1.4 MG/DL (ref 0.5–1.4)
DIFFERENTIAL METHOD: ABNORMAL
EOSINOPHIL # BLD AUTO: 0.1 K/UL (ref 0–0.5)
EOSINOPHIL NFR BLD: 1.3 % (ref 0–8)
ERYTHROCYTE [DISTWIDTH] IN BLOOD BY AUTOMATED COUNT: 14.4 % (ref 11.5–14.5)
EST. GFR  (AFRICAN AMERICAN): 45 ML/MIN/1.73 M^2
EST. GFR  (NON AFRICAN AMERICAN): 39 ML/MIN/1.73 M^2
GLUCOSE SERPL-MCNC: 195 MG/DL (ref 70–110)
HCT VFR BLD AUTO: 40.2 % (ref 37–48.5)
HGB BLD-MCNC: 12.9 G/DL (ref 12–16)
IMM GRANULOCYTES # BLD AUTO: 0.01 K/UL (ref 0–0.04)
IMM GRANULOCYTES NFR BLD AUTO: 0.2 % (ref 0–0.5)
LYMPHOCYTES # BLD AUTO: 3.4 K/UL (ref 1–4.8)
LYMPHOCYTES NFR BLD: 56.4 % (ref 18–48)
MCH RBC QN AUTO: 27.4 PG (ref 27–31)
MCHC RBC AUTO-ENTMCNC: 32.1 G/DL (ref 32–36)
MCV RBC AUTO: 85 FL (ref 82–98)
MONOCYTES # BLD AUTO: 0.4 K/UL (ref 0.3–1)
MONOCYTES NFR BLD: 6.5 % (ref 4–15)
NEUTROPHILS # BLD AUTO: 2.1 K/UL (ref 1.8–7.7)
NEUTROPHILS NFR BLD: 34.9 % (ref 38–73)
NRBC BLD-RTO: 0 /100 WBC
PLATELET # BLD AUTO: 255 K/UL (ref 150–450)
PMV BLD AUTO: 10.9 FL (ref 9.2–12.9)
POTASSIUM SERPL-SCNC: 3.6 MMOL/L (ref 3.5–5.1)
PROT SERPL-MCNC: 8 G/DL (ref 6–8.4)
RBC # BLD AUTO: 4.71 M/UL (ref 4–5.4)
SODIUM SERPL-SCNC: 140 MMOL/L (ref 136–145)
TROPONIN I SERPL DL<=0.01 NG/ML-MCNC: <0.006 NG/ML (ref 0–0.03)
TROPONIN I SERPL DL<=0.01 NG/ML-MCNC: <0.006 NG/ML (ref 0–0.03)
WBC # BLD AUTO: 6.03 K/UL (ref 3.9–12.7)

## 2022-06-16 PROCEDURE — 99285 EMERGENCY DEPT VISIT HI MDM: CPT | Mod: 25

## 2022-06-16 PROCEDURE — 93005 ELECTROCARDIOGRAM TRACING: CPT

## 2022-06-16 PROCEDURE — 25000003 PHARM REV CODE 250: Performed by: NURSE PRACTITIONER

## 2022-06-16 PROCEDURE — 96360 HYDRATION IV INFUSION INIT: CPT

## 2022-06-16 PROCEDURE — 84484 ASSAY OF TROPONIN QUANT: CPT | Mod: 91 | Performed by: EMERGENCY MEDICINE

## 2022-06-16 PROCEDURE — 84484 ASSAY OF TROPONIN QUANT: CPT | Performed by: NURSE PRACTITIONER

## 2022-06-16 PROCEDURE — 93010 EKG 12-LEAD: ICD-10-PCS | Mod: ,,, | Performed by: INTERNAL MEDICINE

## 2022-06-16 PROCEDURE — 80053 COMPREHEN METABOLIC PANEL: CPT | Performed by: NURSE PRACTITIONER

## 2022-06-16 PROCEDURE — 93010 ELECTROCARDIOGRAM REPORT: CPT | Mod: ,,, | Performed by: INTERNAL MEDICINE

## 2022-06-16 PROCEDURE — 83880 ASSAY OF NATRIURETIC PEPTIDE: CPT | Performed by: NURSE PRACTITIONER

## 2022-06-16 PROCEDURE — 85025 COMPLETE CBC W/AUTO DIFF WBC: CPT | Performed by: NURSE PRACTITIONER

## 2022-06-16 RX ORDER — DICLOFENAC SODIUM 75 MG/1
75 TABLET, DELAYED RELEASE ORAL 2 TIMES DAILY
COMMUNITY
Start: 2022-01-03 | End: 2023-01-09

## 2022-06-16 RX ORDER — ATORVASTATIN CALCIUM 80 MG/1
80 TABLET, FILM COATED ORAL NIGHTLY
COMMUNITY
Start: 2022-02-21

## 2022-06-16 RX ORDER — METFORMIN HYDROCHLORIDE 500 MG/1
1000 TABLET, EXTENDED RELEASE ORAL 2 TIMES DAILY
COMMUNITY
Start: 2022-01-27

## 2022-06-16 RX ORDER — ALBUTEROL SULFATE 90 UG/1
2 AEROSOL, METERED RESPIRATORY (INHALATION) EVERY 6 HOURS PRN
COMMUNITY

## 2022-06-16 RX ORDER — TORSEMIDE 20 MG/1
20 TABLET ORAL DAILY
COMMUNITY
Start: 2022-05-20

## 2022-06-16 RX ORDER — SPIRONOLACTONE 50 MG/1
50 TABLET, FILM COATED ORAL DAILY
COMMUNITY
Start: 2022-03-30

## 2022-06-16 RX ORDER — PRASUGREL 10 MG/1
TABLET, FILM COATED ORAL
COMMUNITY
Start: 2022-03-07 | End: 2023-01-09

## 2022-06-16 RX ORDER — METHOCARBAMOL 750 MG/1
TABLET, FILM COATED ORAL
COMMUNITY
Start: 2022-05-20 | End: 2022-06-16

## 2022-06-16 RX ORDER — MECLIZINE HYDROCHLORIDE 25 MG/1
25 TABLET ORAL
COMMUNITY
Start: 2022-05-26 | End: 2023-01-09

## 2022-06-16 RX ORDER — EMPAGLIFLOZIN 10 MG/1
10 TABLET, FILM COATED ORAL DAILY
COMMUNITY
Start: 2022-04-11 | End: 2023-01-09

## 2022-06-16 RX ORDER — POTASSIUM CHLORIDE 1500 MG/1
20 TABLET, EXTENDED RELEASE ORAL DAILY
COMMUNITY
Start: 2022-05-26

## 2022-06-16 RX ORDER — METOPROLOL TARTRATE 100 MG/1
100 TABLET ORAL DAILY
COMMUNITY

## 2022-06-16 RX ADMIN — SODIUM CHLORIDE 500 ML: 0.9 INJECTION, SOLUTION INTRAVENOUS at 10:06

## 2022-06-16 NOTE — PHARMACY MED REC
"  [facility]           Pharmacy  Admission Medication History     The home medication history was taken by [facility].      Medication history obtained from Medications listed below were obtained from: Patient/family    Based on information gathered for medication list, you may go to "Admission" then "Reconcile Home Medications" tabs to review and/or act upon those items.      The home medication list has been updated by the Pharmacy department.    Please read ALL comments highlighted in yellow.    Please address this information as you see fit.     Feel free to contact us if you have any questions or require assistance.    The medications listed below were removed from the home medication list.  Please reorder if appropriate:     Patient reports NOT TAKING the following medication(s):  o Flexeril 10mg  o voltaren 1% gel  o Lasix 40mg  o Ibuprofen 600mg  o lidoderm patch 5%  o Medrol 32mg  o zofran-odt 4mg  o Percocet 5-325mg  o Kenalog 0.1% cream  o Methocarbamol 750mg      No current facility-administered medications on file prior to encounter.     Current Outpatient Medications on File Prior to Encounter   Medication Sig Dispense Refill    albuterol (PROVENTIL/VENTOLIN HFA) 90 mcg/actuation inhaler Inhale 2 puffs into the lungs every 6 (six) hours as needed for Shortness of Breath. Rescue      amitriptyline (ELAVIL) 10 MG tablet Take 10 mg by mouth nightly as needed for Insomnia.      amLODIPine (NORVASC) 10 MG tablet Take 10 mg by mouth once daily.      aspirin 81 MG Chew Take 81 mg by mouth once daily.      atorvastatin (LIPITOR) 80 MG tablet Take 80 mg by mouth every evening.      diclofenac (VOLTAREN) 75 MG EC tablet Take 75 mg by mouth 2 (two) times daily.      gabapentin (NEURONTIN) 300 MG capsule Take 1 capsule (300 mg total) by mouth 3 (three) times daily. 270 capsule 3    JARDIANCE 10 mg tablet Take 10 mg by mouth once daily.      losartan (COZAAR) 100 MG tablet " Take 100 mg by mouth once daily.      meclizine (ANTIVERT) 25 mg tablet Take 25 mg by mouth as needed.      metFORMIN (GLUCOPHAGE-XR) 500 MG ER 24hr tablet Take 1,000 mg by mouth 2 (two) times daily.      metoprolol tartrate (LOPRESSOR) 100 MG tablet Take 100 mg by mouth 2 (two) times daily.      pantoprazole (PROTONIX) 40 MG tablet Take 40 mg by mouth once daily.      potassium chloride (K-TAB) 20 mEq Take 20 mEq by mouth 2 (two) times a day.      spironolactone (ALDACTONE) 50 MG tablet Take 50 mg by mouth once daily.      torsemide (DEMADEX) 20 MG Tab Take 20 mg by mouth 2 (two) times daily.      prasugreL (EFFIENT) 10 mg Tab       [DISCONTINUED] albuterol (PROVENTIL/VENTOLIN HFA) 90 mcg/actuation inhaler Inhale 2 puffs into the lungs every 6 (six) hours as needed for Shortness of Breath. Rescue 18 g 0    [DISCONTINUED] atorvastatin (LIPITOR) 40 MG tablet Take 40 mg by mouth once daily.      [DISCONTINUED] cyclobenzaprine (FLEXERIL) 10 MG tablet Take 10 mg by mouth 2 (two) times daily as needed.      [DISCONTINUED] diclofenac sodium (VOLTAREN ARTHRITIS PAIN) 1 % Gel Apply 2 g topically once daily. 1 Tube 0    [DISCONTINUED] diclofenac sodium (VOLTAREN) 1 % Gel Apply 2 g topically 4 (four) times daily. 1 Tube 2    [DISCONTINUED] furosemide (LASIX) 40 MG tablet furosemide 40 mg tablet   TAKE 1 TABLET BY MOUTH ONCE DAILY      [DISCONTINUED] ibuprofen (ADVIL,MOTRIN) 600 MG tablet Take 1 tablet (600 mg total) by mouth every 6 (six) hours as needed for Pain. 15 tablet 0    [DISCONTINUED] JARDIANCE 25 mg tablet Take 25 mg by mouth once daily.      [DISCONTINUED] LIDOcaine (LIDODERM) 5 % Place 1 patch onto the skin once daily. Remove & Discard patch within 12 hours or as directed by MD Moreira patch 0    [DISCONTINUED] metFORMIN (GLUCOPHAGE) 500 MG tablet Take 500 mg by mouth 2 (two) times daily with meals.      [DISCONTINUED] methocarbamoL (ROBAXIN) 750 MG Tab TAKE 1 TABLET BY MOUTH THREE TIMES DAILY AS  NEEDED FOR 10 DAYS      [DISCONTINUED] methylPREDNISolone (MEDROL) 32 MG tablet TAKE ONE TABLET BY MOUTH ONCE 12 HOURS BEFORE TEST AND 1 TAB 2 HOURS BEFORE TEST      [DISCONTINUED] metoprolol tartrate (LOPRESSOR) 100 MG tablet Take 1 tablet (100 mg total) by mouth 2 (two) times daily. 180 tablet 3    [DISCONTINUED] ondansetron (ZOFRAN-ODT) 4 MG TbDL Take 1 tablet (4 mg total) by mouth every 8 (eight) hours as needed. 14 tablet 1    [DISCONTINUED] oxyCODONE-acetaminophen (PERCOCET) 5-325 mg per tablet Take 1 tablet by mouth every 4 (four) hours as needed for Pain. 15 tablet 0    [DISCONTINUED] spironolactone (ALDACTONE) 25 MG tablet Take 25 mg by mouth once daily.      [DISCONTINUED] triamcinolone acetonide 0.1% (KENALOG) 0.1 % cream Apply topically 2 (two) times daily. for 10 days 15 g 0       Please address this information as you see fit.  Feel free to contact us if you have any questions or require assistance.    [facility]  [phone]                .

## 2022-06-16 NOTE — ED PROVIDER NOTES
Encounter Date: 6/16/2022       History     Chief Complaint   Patient presents with    Chest Pain    Shortness of Breath     Pt c/o pain in midsternal chest and upper back for the past week. Also reports mild SOB. Reports hx of CHF.      66-year-old female presents emergency room with reports of midsternal chest pain with radiation into her back for the past week. Pt states she is followed per Dr Meek. Pt states the pain is intermittent in nature. No hemoptysis or coughing. No increase in swelling to the lower exts reported. PMH of arthritis, DM, cataracts, HTN, Hyperlipidemia, sciatica.     The history is provided by the patient and a relative. No  was used.     Review of patient's allergies indicates:   Allergen Reactions    Codeine Nausea And Vomiting    Lisinopril      Past Medical History:   Diagnosis Date    Arthritis     Cataract     Diabetes mellitus     Hyperlipidemia     Hypertension     Pre-diabetes     Sciatica      Past Surgical History:   Procedure Laterality Date    HYSTERECTOMY      LEG SURGERY      TONSILLECTOMY       Family History   Problem Relation Age of Onset    Diabetes Mother     Glaucoma Mother     No Known Problems Father     No Known Problems Sister     No Known Problems Brother     No Known Problems Maternal Aunt     No Known Problems Maternal Uncle     No Known Problems Paternal Aunt     No Known Problems Paternal Uncle     No Known Problems Maternal Grandmother     No Known Problems Maternal Grandfather     No Known Problems Paternal Grandmother     No Known Problems Paternal Grandfather     Amblyopia Neg Hx     Blindness Neg Hx     Cancer Neg Hx     Cataracts Neg Hx     Hypertension Neg Hx     Macular degeneration Neg Hx     Retinal detachment Neg Hx     Strabismus Neg Hx     Stroke Neg Hx     Thyroid disease Neg Hx      Social History     Tobacco Use    Smoking status: Never Smoker    Smokeless tobacco: Never Used   Substance  Use Topics    Alcohol use: No    Drug use: Never     Review of Systems   Constitutional: Negative for fever.   Respiratory: Positive for shortness of breath.    Cardiovascular: Positive for chest pain. Negative for palpitations and leg swelling.   Gastrointestinal: Negative for diarrhea, nausea and vomiting.   Genitourinary: Negative for dysuria.   Musculoskeletal: Negative for neck pain and neck stiffness.   Skin: Negative for rash and wound.       Physical Exam     Initial Vitals   BP Pulse Resp Temp SpO2   06/16/22 0845 06/16/22 0908 06/16/22 1102 -- 06/16/22 0845   (!) 148/71 78 20  97 %      MAP       --                Physical Exam    Constitutional: She appears well-developed and well-nourished. She is Obese .  Non-toxic appearance. She does not have a sickly appearance. She does not appear ill.   HENT:   Head: Normocephalic.   Right Ear: Hearing and tympanic membrane normal.   Left Ear: Hearing and tympanic membrane normal.   Nose: Nose normal.   Mouth/Throat: Oropharynx is clear and moist.   Eyes: Lids are normal. Pupils are equal, round, and reactive to light.   Neck:   Normal range of motion.  Cardiovascular: Normal rate.   Pulmonary/Chest: Breath sounds normal. No respiratory distress. She has no wheezes. She has no rhonchi.   Abdominal: Abdomen is soft. There is no abdominal tenderness.   Musculoskeletal:         General: Normal range of motion.      Cervical back: Normal range of motion. No rigidity.     Neurological: She is alert and oriented to person, place, and time.   Skin: Skin is warm and dry. No rash noted.   Psychiatric: She has a normal mood and affect. Her behavior is normal. Judgment and thought content normal.         ED Course   Procedures  Labs Reviewed   CBC W/ AUTO DIFFERENTIAL - Abnormal; Notable for the following components:       Result Value    Gran % 34.9 (*)     Lymph % 56.4 (*)     All other components within normal limits   COMPREHENSIVE METABOLIC PANEL - Abnormal; Notable  for the following components:    CO2 21 (*)     Glucose 195 (*)     BUN 27 (*)     Anion Gap 17 (*)     eGFR if  45 (*)     eGFR if non  39 (*)     All other components within normal limits   TROPONIN I   B-TYPE NATRIURETIC PEPTIDE   TROPONIN I        ECG Results          EKG 12-lead (In process)  Result time 06/16/22 11:52:24    In process by Interface, Lab In [facility] (06/16/22 11:52:24)                 Narrative:    Test Reason : R06.02,    Vent. Rate : 078 BPM     Atrial Rate : 078 BPM     P-R Int : 198 ms          QRS Dur : 088 ms      QT Int : 384 ms       P-R-T Axes : 071 -10 044 degrees     QTc Int : 437 ms    Normal sinus rhythm  Low voltage QRS  Borderline Abnormal ECG  When compared with ECG of 09-MAY-2021 01:31,  No significant change was found    Referred By: [email or web address]   SELF           Confirmed By:                             Imaging Results          X-Ray Chest AP Portable (Final result)  Result time 06/16/22 09:35:39    Final result by Yg Garcia MD (06/16/22 09:35:39)                 Impression:      See above      Electronically signed by: Yg Garcia MD  Date:    06/16/2022  Time:    09:35             Narrative:    EXAMINATION:  XR CHEST AP PORTABLE    CLINICAL HISTORY:  CHF;    TECHNIQUE:  Single frontal view of the chest was performed.    COMPARISON:  05/08/2021    FINDINGS:  Cardiac size is normal.  Lungs are clear no cardiac failure, infiltrate or pleural effusion is seen.                                 Medications   sodium chloride 0.9% bolus 500 mL (0 mLs Intravenous Stopped 6/16/22 1152)     Medical Decision Making:   Clinical Tests:   Lab Tests: Ordered and Reviewed  Radiological Study: Ordered and Reviewed  Medical Tests: Ordered and Reviewed             ED Course as of 06/16/22 1338   Thu Jun 16, 2022   1055 EKG with rate of 78, NSR with no stemi noted. Signed per Dr Rg.  [DT]   1220 Repeat troponin pending.  [DT]   1333 Repeat troponin  is negative. Follow up appmt with Dr. Meek recommended. Pt is not toxic in appearance and is stable at this time for dc.  [DT]      ED Course User Index  [DT] Anni Proctor NP             Clinical Impression:   Final diagnoses:  [R06.02] Shortness of breath  [R07.9] Chest pain, unspecified type (Primary)  [E11.65] Type 2 diabetes mellitus with hyperglycemia, unspecified whether long term insulin use          ED Disposition Condition    Discharge Stable        ED Prescriptions     None        Follow-up Information     Follow up With Specialties Details Why Contact Info    Levi Meek MD Interventional Cardiology, Cardiology Schedule an appointment as soon as possible for a visit in 2 days  [address] [address]  [phone]             Anni Proctor NP  06/16/22 4617

## 2022-06-16 NOTE — DISCHARGE INSTRUCTIONS

## 2022-06-16 NOTE — ED PROVIDER NOTES
Encounter Date: 6/16/2022    SCRIBE #1 NOTE: I, Martin Whaley , am scribing for, and in the presence of, Carmencita Rg MD.       History     Chief Complaint   Patient presents with    Chest Pain    Shortness of Breath     Pt c/o pain in midsternal chest and upper back for the past week. Also reports mild SOB. Reports hx of CHF.      Christie Damian is a 66 year old female who presents to the ED Chest Pain. Patient reports intermittent mid sternal chest pain and back pain for one week. She also reports shortness of breath.She also states she had 2 stents placed  March 7, 2022 by Doctor Levi Meek.     The history is provided by the patient.     Review of patient's allergies indicates:   Allergen Reactions    Codeine Nausea And Vomiting    Lisinopril      Past Medical History:   Diagnosis Date    Arthritis     Cataract     Diabetes mellitus     Hyperlipidemia     Hypertension     Pre-diabetes     Sciatica      Past Surgical History:   Procedure Laterality Date    HYSTERECTOMY      LEG SURGERY      TONSILLECTOMY       Family History   Problem Relation Age of Onset    Diabetes Mother     Glaucoma Mother     No Known Problems Father     No Known Problems Sister     No Known Problems Brother     No Known Problems Maternal Aunt     No Known Problems Maternal Uncle     No Known Problems Paternal Aunt     No Known Problems Paternal Uncle     No Known Problems Maternal Grandmother     No Known Problems Maternal Grandfather     No Known Problems Paternal Grandmother     No Known Problems Paternal Grandfather     Amblyopia Neg Hx     Blindness Neg Hx     Cancer Neg Hx     Cataracts Neg Hx     Hypertension Neg Hx     Macular degeneration Neg Hx     Retinal detachment Neg Hx     Strabismus Neg Hx     Stroke Neg Hx     Thyroid disease Neg Hx      Social History     Tobacco Use    Smoking status: Never Smoker    Smokeless tobacco: Never Used   Substance Use Topics     Alcohol use: No    Drug use: Never     Review of Systems   Constitutional: Negative for fever.   HENT: Negative for sore throat.    Respiratory: Positive for shortness of breath.    Cardiovascular: Positive for chest pain.   Gastrointestinal: Negative for nausea.   Genitourinary: Negative for dysuria.   Musculoskeletal: Negative for back pain.   Skin: Negative for rash.   Neurological: Negative for weakness.   Hematological: Does not bruise/bleed easily.       Physical Exam     Initial Vitals   BP Pulse Resp Temp SpO2   06/16/22 0845 06/16/22 0908 06/16/22 1102 -- 06/16/22 0845   (!) 148/71 78 20  97 %      MAP       --                Physical Exam  ***  ED Course   Procedures  Labs Reviewed   CBC W/ AUTO DIFFERENTIAL - Abnormal; Notable for the following components:       Result Value    Gran % 34.9 (*)     Lymph % 56.4 (*)     All other components within normal limits   COMPREHENSIVE METABOLIC PANEL - Abnormal; Notable for the following components:    CO2 21 (*)     Glucose 195 (*)     BUN 27 (*)     Anion Gap 17 (*)     eGFR if  45 (*)     eGFR if non  39 (*)     All other components within normal limits   TROPONIN I   B-TYPE NATRIURETIC PEPTIDE   TROPONIN I        ECG Results          EKG 12-lead (In process)  Result time 06/16/22 11:52:24    In process by Interface, Lab In [facility] (06/16/22 11:52:24)                 Narrative:    Test Reason : R06.02,    Vent. Rate : 078 BPM     Atrial Rate : 078 BPM     P-R Int : 198 ms          QRS Dur : 088 ms      QT Int : 384 ms       P-R-T Axes : 071 -10 044 degrees     QTc Int : 437 ms    Normal sinus rhythm  Low voltage QRS  Borderline Abnormal ECG  When compared with ECG of 09-MAY-2021 01:31,  No significant change was found    Referred By: [email or web address]   SELF           Confirmed By:                             Imaging Results          X-Ray Chest AP Portable (Final result)  Result time 06/16/22 09:35:39    Final result by Yg HANSEN  MD Jose (06/16/22 09:35:39)                 Impression:      See above      Electronically signed by: Yg Garcia MD  Date:    06/16/2022  Time:    09:35             Narrative:    EXAMINATION:  XR CHEST AP PORTABLE    CLINICAL HISTORY:  CHF;    TECHNIQUE:  Single frontal view of the chest was performed.    COMPARISON:  05/08/2021    FINDINGS:  Cardiac size is normal.  Lungs are clear no cardiac failure, infiltrate or pleural effusion is seen.                                 Medications   sodium chloride 0.9% bolus 500 mL (0 mLs Intravenous Stopped 6/16/22 1152)              Scribe Attestation:   Scribe #1: I performed the above scribed service and the documentation accurately describes the services I performed. I attest to the accuracy of the note.        ED Course as of 06/16/22 1158   Thu Jun 16, 2022   1055 EKG with rate of 78, NSR with no stemi noted. Signed per Dr Rg.  [DT]      ED Course User Index  [DT] Anni Proctor NP             Clinical Impression:   Final diagnoses:  [R06.02] Shortness of breath         ***

## 2022-06-16 NOTE — ED TRIAGE NOTES
Patient reports one week hx of intermittent, midsternal chest pain, back pain, increased BURCH and orthopnea x1 week. Hx of CHF. No BLE edema noted.    Two patient identifiers have been checked and are correct.      Appearance: Pt awake, alert & oriented to person, place & time. Pt in no acute distress at present time. Pt is clean and well groomed with clothes appropriately fastened.   Skin: Skin warm, dry & intact. Color consistent with ethnicity. Mucous membranes moist. No breakdown or brusing noted.   Musculoskeletal: Patient moving all extremities well, no obvious swelling or deformities noted.   Respiratory: Respirations spontaneous, even, and non-labored. Visible chest rise noted. Airway is open and patent. No accessory muscle use noted.   Neurologic: Sensation is intact. Speech is clear and appropriate. Eyes open spontaneously, behavior appropriate to situation, follows commands, facial expression symmetrical, bilateral hand grasp equal and even, purposeful motor response noted.  Cardiac: All peripheral pulses present. No Bilateral lower extremity edema. Cap refill is <3 seconds.  Abdomen: Abdomen soft, non-tender to palpation.   : Pt reports no dysuria or hematuria.

## 2023-01-09 ENCOUNTER — HOSPITAL ENCOUNTER (EMERGENCY)
Facility: HOSPITAL | Age: 68
Discharge: HOME | End: 2023-01-09
Attending: EMERGENCY MEDICINE
Payer: MEDICARE

## 2023-01-09 VITALS
RESPIRATION RATE: 14 BRPM | WEIGHT: 220 LBS | TEMPERATURE: 98 F | HEART RATE: 73 BPM | DIASTOLIC BLOOD PRESSURE: 72 MMHG | OXYGEN SATURATION: 99 % | SYSTOLIC BLOOD PRESSURE: 118 MMHG | HEIGHT: 60 IN | BODY MASS INDEX: 43.19 KG/M2

## 2023-01-09 DIAGNOSIS — R07.9 CHEST PAIN, UNSPECIFIED TYPE: Primary | ICD-10-CM

## 2023-01-09 DIAGNOSIS — R07.9 CHEST PAIN: ICD-10-CM

## 2023-01-09 LAB
ALBUMIN SERPL BCP-MCNC: 4.6 G/DL (ref 3.5–5.2)
ALP SERPL-CCNC: 90 U/L (ref 55–135)
ALT SERPL W/O P-5'-P-CCNC: 19 U/L (ref 10–44)
ANION GAP SERPL CALC-SCNC: 11 MMOL/L (ref 8–16)
AST SERPL-CCNC: 15 U/L (ref 10–40)
BASOPHILS # BLD AUTO: 0.05 K/UL (ref 0–0.2)
BASOPHILS NFR BLD: 0.8 % (ref 0–1.9)
BILIRUB SERPL-MCNC: 0.5 MG/DL (ref 0.1–1)
BILIRUB UR QL STRIP: NEGATIVE
BNP SERPL-MCNC: <10 PG/ML (ref 0–99)
BUN SERPL-MCNC: 12 MG/DL (ref 8–23)
CALCIUM SERPL-MCNC: 10.1 MG/DL (ref 8.7–10.5)
CHLORIDE SERPL-SCNC: 102 MMOL/L (ref 95–110)
CLARITY UR: CLEAR
CO2 SERPL-SCNC: 27 MMOL/L (ref 23–29)
COLOR UR: COLORLESS
CREAT SERPL-MCNC: 1.1 MG/DL (ref 0.5–1.4)
D DIMER PPP IA.FEU-MCNC: 0.32 MG/L FEU
DIFFERENTIAL METHOD: ABNORMAL
EOSINOPHIL # BLD AUTO: 0.1 K/UL (ref 0–0.5)
EOSINOPHIL NFR BLD: 1.5 % (ref 0–8)
ERYTHROCYTE [DISTWIDTH] IN BLOOD BY AUTOMATED COUNT: 14.3 % (ref 11.5–14.5)
EST. GFR  (NO RACE VARIABLE): 55 ML/MIN/1.73 M^2
GLUCOSE SERPL-MCNC: 92 MG/DL (ref 70–110)
GLUCOSE UR QL STRIP: NEGATIVE
HCT VFR BLD AUTO: 38.8 % (ref 37–48.5)
HGB BLD-MCNC: 12.5 G/DL (ref 12–16)
HGB UR QL STRIP: NEGATIVE
IMM GRANULOCYTES # BLD AUTO: 0.01 K/UL (ref 0–0.04)
IMM GRANULOCYTES NFR BLD AUTO: 0.2 % (ref 0–0.5)
KETONES UR QL STRIP: NEGATIVE
LEUKOCYTE ESTERASE UR QL STRIP: NEGATIVE
LYMPHOCYTES # BLD AUTO: 3.3 K/UL (ref 1–4.8)
LYMPHOCYTES NFR BLD: 56.3 % (ref 18–48)
MCH RBC QN AUTO: 27.5 PG (ref 27–31)
MCHC RBC AUTO-ENTMCNC: 32.2 G/DL (ref 32–36)
MCV RBC AUTO: 86 FL (ref 82–98)
MONOCYTES # BLD AUTO: 0.4 K/UL (ref 0.3–1)
MONOCYTES NFR BLD: 6.8 % (ref 4–15)
NEUTROPHILS # BLD AUTO: 2 K/UL (ref 1.8–7.7)
NEUTROPHILS NFR BLD: 34.4 % (ref 38–73)
NITRITE UR QL STRIP: NEGATIVE
NRBC BLD-RTO: 0 /100 WBC
PH UR STRIP: 6 [PH] (ref 5–8)
PLATELET # BLD AUTO: 305 K/UL (ref 150–450)
PMV BLD AUTO: 9.9 FL (ref 9.2–12.9)
POTASSIUM SERPL-SCNC: 4.3 MMOL/L (ref 3.5–5.1)
PROT SERPL-MCNC: 8 G/DL (ref 6–8.4)
PROT UR QL STRIP: NEGATIVE
RBC # BLD AUTO: 4.54 M/UL (ref 4–5.4)
SODIUM SERPL-SCNC: 140 MMOL/L (ref 136–145)
SP GR UR STRIP: 1 (ref 1–1.03)
TROPONIN I SERPL DL<=0.01 NG/ML-MCNC: 0.01 NG/ML (ref 0–0.03)
TROPONIN I SERPL DL<=0.01 NG/ML-MCNC: <0.006 NG/ML (ref 0–0.03)
URN SPEC COLLECT METH UR: ABNORMAL
UROBILINOGEN UR STRIP-ACNC: NEGATIVE EU/DL
WBC # BLD AUTO: 5.92 K/UL (ref 3.9–12.7)

## 2023-01-09 PROCEDURE — 85025 COMPLETE CBC W/AUTO DIFF WBC: CPT | Performed by: NURSE PRACTITIONER

## 2023-01-09 PROCEDURE — 84484 ASSAY OF TROPONIN QUANT: CPT | Mod: 91 | Performed by: NURSE PRACTITIONER

## 2023-01-09 PROCEDURE — 81003 URINALYSIS AUTO W/O SCOPE: CPT | Performed by: EMERGENCY MEDICINE

## 2023-01-09 PROCEDURE — 83880 ASSAY OF NATRIURETIC PEPTIDE: CPT | Performed by: NURSE PRACTITIONER

## 2023-01-09 PROCEDURE — 84484 ASSAY OF TROPONIN QUANT: CPT | Performed by: EMERGENCY MEDICINE

## 2023-01-09 PROCEDURE — 85379 FIBRIN DEGRADATION QUANT: CPT | Performed by: EMERGENCY MEDICINE

## 2023-01-09 PROCEDURE — 93010 EKG 12-LEAD: ICD-10-PCS | Mod: ,,, | Performed by: INTERNAL MEDICINE

## 2023-01-09 PROCEDURE — 93005 ELECTROCARDIOGRAM TRACING: CPT

## 2023-01-09 PROCEDURE — 99285 EMERGENCY DEPT VISIT HI MDM: CPT | Mod: 25

## 2023-01-09 PROCEDURE — 93010 ELECTROCARDIOGRAM REPORT: CPT | Mod: ,,, | Performed by: INTERNAL MEDICINE

## 2023-01-09 PROCEDURE — 80053 COMPREHEN METABOLIC PANEL: CPT | Performed by: NURSE PRACTITIONER

## 2023-01-09 RX ORDER — ASCORBIC ACID 500 MG
500 TABLET ORAL DAILY
COMMUNITY

## 2023-01-09 RX ORDER — CYCLOBENZAPRINE HCL 5 MG
5 TABLET ORAL 2 TIMES DAILY PRN
COMMUNITY
Start: 2022-11-11

## 2023-01-09 RX ORDER — FLUCONAZOLE 150 MG/1
150 TABLET ORAL
COMMUNITY
Start: 2022-08-03 | End: 2023-01-09

## 2023-01-09 RX ORDER — NEOMYCIN SULFATE, POLYMYXIN B SULFATE AND HYDROCORTISONE 10; 3.5; 1 MG/ML; MG/ML; [USP'U]/ML
3 SUSPENSION/ DROPS AURICULAR (OTIC) 3 TIMES DAILY
COMMUNITY
Start: 2022-11-29

## 2023-01-09 RX ORDER — PNV NO.95/FERROUS FUM/FOLIC AC 28MG-0.8MG
100 TABLET ORAL DAILY
COMMUNITY

## 2023-01-09 RX ORDER — SEMAGLUTIDE 1.34 MG/ML
INJECTION, SOLUTION SUBCUTANEOUS
COMMUNITY
Start: 2023-01-06 | End: 2023-01-09 | Stop reason: DRUGHIGH

## 2023-01-09 RX ORDER — FUROSEMIDE 40 MG/1
40 TABLET ORAL 2 TIMES DAILY
COMMUNITY
Start: 2022-11-02 | End: 2023-01-09

## 2023-01-09 RX ORDER — AMLODIPINE BESYLATE 5 MG/1
5 TABLET ORAL DAILY
COMMUNITY
Start: 2022-09-28

## 2023-01-09 RX ORDER — BUDESONIDE AND FORMOTEROL FUMARATE DIHYDRATE 80; 4.5 UG/1; UG/1
2 AEROSOL RESPIRATORY (INHALATION) 2 TIMES DAILY
COMMUNITY
Start: 2022-11-01

## 2023-01-09 RX ORDER — CHOLECALCIFEROL (VITAMIN D3) 25 MCG
1000 TABLET ORAL DAILY
COMMUNITY

## 2023-01-09 RX ORDER — SEMAGLUTIDE 1.34 MG/ML
0.5 INJECTION, SOLUTION SUBCUTANEOUS
COMMUNITY
Start: 2022-10-14

## 2023-01-09 NOTE — PHARMACY MED REC
"Admission Medication History     The home medication history was taken by Marian Knott CPhT.    Medication history obtained from, Patient Verified    You may go to "Admission" then "Reconcile Home Medications" tabs to review and/or act upon these items.     The home medication list has been updated by the Pharmacy department.   Please read ALL comments highlighted in yellow.   Please address this information as you see fit.    Feel free to contact us if you have any questions or require assistance.      The medications listed below were removed from the home medication list.  Please reorder if appropriate:  Patient reports no longer taking the following medication(s):  Meclizine 25 mg  Diclofenac 75 mg  Jardiance 10 mg  Fluconazole 150 mg  Furosemide 40 mg  Prasugrel 10 mg      Marian Knott CPhT.  Ext [phone]               .        "

## 2023-01-09 NOTE — FIRST PROVIDER EVALUATION
Emergency Department TeleTriage Encounter Note      CHIEF COMPLAINT    Chief Complaint   Patient presents with    Chest Pain     Pt c/o chest pain that started at rest in Saturday. The pain has been intermittent. She describes it as a pressure and it radiates to her back. Pt has also had some intermittent dizziness and shortness of breath. Pt has a history of CHF and denies any increased swelling.        VITAL SIGNS   Initial Vitals [01/09/23 1142]   BP Pulse Resp Temp SpO2   127/76 80 18 98.3 °F (36.8 °C) 98 %      MAP       --            ALLERGIES    Review of patient's allergies indicates:   Allergen Reactions    Codeine Nausea And Vomiting    Lisinopril        PROVIDER TRIAGE NOTE  This is a teletriage evaluation of a 67 y.o. female presenting to the ED complaining of intermittent CP and SOB since Saturday.  No current CP.  Pain radiates to back.  No fever.     Well-appearing, speaking in full sentences.  No distress.     Initial orders will be placed and care will be transferred to an alternate provider when patient is roomed for a full evaluation. Any additional orders and the final disposition will be determined by that provider.         ORDERS  Labs Reviewed   CBC W/ AUTO DIFFERENTIAL   COMPREHENSIVE METABOLIC PANEL   TROPONIN I   B-TYPE NATRIURETIC PEPTIDE       ED Orders (720h ago, onward)      Start Ordered     Status Ordering Provider    01/09/23 1218 01/09/23 1217  Vital signs  Every 15 min         Ordered JACKY TORRES N.    01/09/23 1218 01/09/23 1217  Cardiac Monitoring - Adult  Continuous        Comments: Notify Physician If:    Ordered JACKY TORRES N.    01/09/23 1218 01/09/23 1217  Pulse Oximetry Continuous  Continuous         Ordered JACKY TORRES N.    01/09/23 1218 01/09/23 1217  Diet NPO  Diet effective now         Ordered JACKY TORRES N.    01/09/23 1218 01/09/23 1217  Saline lock IV  Once         Ordered JACKY TORRES N.    01/09/23 1218  01/09/23 1217  EKG 12-lead  Once        Comments: Do not perform if previously done during this visit/ triage    Ordered BRIAN JACKY N.    01/09/23 1218 01/09/23 1217  CBC auto differential  STAT         Ordered JACKY TORRES N.    01/09/23 1218 01/09/23 1217  Comprehensive metabolic panel  STAT         Ordered VIBHADORINDA JACKY N.    01/09/23 1218 01/09/23 1217  Troponin I #1  STAT         Ordered VIBHADORINDA JACKY N.    01/09/23 1218 01/09/23 1217  BNP  STAT         Ordered VIBHADORINDA JACKY N.    01/09/23 1218 01/09/23 1217  X-Ray Chest AP Portable  1 time imaging         Ordered STANRAJ JACKY N.              Virtual Visit Note: The provider triage portion of this emergency department evaluation and documentation was performed via [employer], a HIPAA-compliant telemedicine application, in concert with a tele-presenter in the room. A face to face patient evaluation with one of my colleagues will occur once the patient is placed in an emergency department room.      DISCLAIMER: This note was prepared with [employer] voice recognition transcription software. Garbled syntax, mangled pronouns, and other bizarre constructions may be attributed to that software system.

## 2023-01-09 NOTE — ED PROVIDER NOTES
Encounter Date: 1/9/2023       History     Chief Complaint   Patient presents with    Chest Pain     Pt c/o chest pain that started at rest in Saturday. The pain has been intermittent. She describes it as a pressure and it radiates to her back. Pt has also had some intermittent dizziness and shortness of breath. Pt has a history of CHF and denies any increased swelling.      67-year-old female presents with intermittent substernal chest pain that began 2 days denies radiating pain.  Is any exacerbating or relieving symptoms.  Does comment on some dizziness and shortness of breath.  Denies any is any focal motor weakness or paresthesias./chills comment admits to some nausea with no vomiting.  States it is not been taking any medications to help.  She sees Dr. IZAIAH Meek for Cardiology.    Review of patient's allergies indicates:   Allergen Reactions    Codeine Nausea And Vomiting    Lisinopril      Past Medical History:   Diagnosis Date    Arthritis     Cataract     Diabetes mellitus     Hyperlipidemia     Hypertension     Pre-diabetes     Sciatica      Past Surgical History:   Procedure Laterality Date    HYSTERECTOMY      LEG SURGERY      TONSILLECTOMY       Family History   Problem Relation Age of Onset    Diabetes Mother     Glaucoma Mother     No Known Problems Father     No Known Problems Sister     No Known Problems Brother     No Known Problems Maternal Aunt     No Known Problems Maternal Uncle     No Known Problems Paternal Aunt     No Known Problems Paternal Uncle     No Known Problems Maternal Grandmother     No Known Problems Maternal Grandfather     No Known Problems Paternal Grandmother     No Known Problems Paternal Grandfather     Amblyopia Neg Hx     Blindness Neg Hx     Cancer Neg Hx     Cataracts Neg Hx     Hypertension Neg Hx     Macular degeneration Neg Hx     Retinal detachment Neg Hx     Strabismus Neg Hx     Stroke Neg Hx     Thyroid disease Neg Hx      Social History     Tobacco Use    Smoking  status: Never    Smokeless tobacco: Never   Substance Use Topics    Alcohol use: No    Drug use: Never     Review of Systems   Respiratory:  Positive for shortness of breath.    Cardiovascular:  Positive for chest pain.     Physical Exam     Initial Vitals [01/09/23 1142]   BP Pulse Resp Temp SpO2   127/76 80 18 98.3 °F (36.8 °C) 98 %      MAP       --         Physical Exam    Nursing note and vitals reviewed.  Constitutional: She appears well-developed and well-nourished.   Cardiovascular:  Normal rate and regular rhythm.           No murmur heard.  Pulmonary/Chest: Breath sounds normal. She has no wheezes.   Abdominal: Abdomen is soft.   Musculoskeletal:         General: Normal range of motion.     Neurological: She is alert and oriented to person, place, and time.   Skin: Skin is warm and dry.       ED Course   Procedures  Labs Reviewed   CBC W/ AUTO DIFFERENTIAL - Abnormal; Notable for the following components:       Result Value    Gran % 34.4 (*)     Lymph % 56.3 (*)     All other components within normal limits   COMPREHENSIVE METABOLIC PANEL - Abnormal; Notable for the following components:    eGFR 55 (*)     All other components within normal limits   URINALYSIS, REFLEX TO URINE CULTURE - Abnormal; Notable for the following components:    Color, UA Colorless (*)     All other components within normal limits    Narrative:     Specimen Source->Urine   TROPONIN I   B-TYPE NATRIURETIC PEPTIDE   D DIMER, QUANTITATIVE   TROPONIN I     EKG Readings: (Independently Interpreted)   Sinus rhythm rate 81, left axis deviation, nonspecific ST-T wave abnormalities interpreted by me.    EKG #2 shows sinus rhythm rate of 73, left axis nonspecific ST-T wave abnormalities interpreted by me   ECG Results              EKG 12-lead (In process)  Result time 01/09/23 13:42:12      In process by Interface, Lab In [facility] (01/09/23 13:42:12)                   Narrative:    Test Reason : R07.9,    Vent. Rate : 081 BPM     Atrial Rate  : 081 BPM     P-R Int : 198 ms          QRS Dur : 082 ms      QT Int : 358 ms       P-R-T Axes : 064 001 026 degrees     QTc Int : 415 ms    Normal sinus rhythm  Low voltage QRS  Cannot rule out Anterior infarct ,age undetermined  Abnormal ECG  When compared with ECG of 16-JUN-2022 08:48,  No significant change was found    Referred By: [email or web address]   SELF           Confirmed By:                                   Imaging Results              X-Ray Chest AP Portable (Final result)  Result time 01/09/23 13:46:19      Final result by Alex Dacosta MD (01/09/23 13:46:19)                   Impression:      No acute radiographic findings on single view of the chest.      Electronically signed by: Alex Dacosta MD  Date:    01/09/2023  Time:    13:46               Narrative:    EXAMINATION:  XR CHEST AP PORTABLE    CLINICAL HISTORY:  Chest Pain;    TECHNIQUE:  Single frontal view of the chest was performed.    COMPARISON:  06/16/2022    FINDINGS:  Cardiac silhouette is normal in size.  Lungs are symmetrically expanded and show no significant consolidation, large effusion or pneumothorax.  Minimal bandlike densities in the lungs stable from prior suggests either mild atelectasis or scarring.  Otherwise, the lungs are clear.  Osseous structures show no acute abnormalities.                                       Medications - No data to display  Medical Decision Making:   ED Management:  No dizziness or feelings of being off balance.  She is had no chest pain while in the emergency department.  She sees Dr. Meek.  Instructed patient to return immediately for any new or worsening symptoms and she verbalized understanding.           ED Course as of 01/09/23 1800   Mon Jan 09, 2023   1324 CBC auto differential(!)  Reviewed, nonspecific abnormalities [CD]   1324 Comprehensive metabolic panel(!)  Reviewed nonspecific abnormalities [CD]   1328 Troponin I #1  Reviewed, negative, anticipate 3 hour delta troponin level [CD]   1328  BNP  Reviewed, negative [CD]   1404 D dimer, quantitative  Reviewed, neg low risk for PE [CD]   1412 I ambulated patient myself outside of room with no dizziness.  Her symptoms were intermittent sound orthostatic.  She states that the last time this happened she was getting out of her car at the grocery store, she gave it a few min and symptoms resolved.  Discussed with patient repeat EKG and 3 hour delta troponin, we discussed that this is in line with current guidelines for intermittent chest pain and my recommendation.  She states she will call her daughter and give me an answer. [CD]   1424 Patient has decided to stay for repeat EKG and troponin [CD]   1439 Urinalysis, Reflex to Urine Culture Urine, Clean Catch(!)  Reviewed, negative [CD]   1643 Troponin I: 0.008  Reviewed, negative will discharge [CD]      ED Course User Index  [CD] Levi Dowell DO                   Clinical Impression:   Final diagnoses:  [R07.9] Chest pain  [R07.9] Chest pain, unspecified type (Primary)        ED Disposition Condition    Discharge Stable          ED Prescriptions    None       Follow-up Information       Follow up With Specialties Details Why Contact Info    Levi Meek MD Interventional Cardiology, Cardiology Schedule an appointment as soon as possible for a visit   [address] [facility] [address] [phone]               Levi Dowell DO  01/09/23 1324

## 2023-07-23 ENCOUNTER — HOSPITAL ENCOUNTER (EMERGENCY)
Facility: HOSPITAL | Age: 68
Discharge: HOME | End: 2023-07-23
Attending: EMERGENCY MEDICINE
Payer: MEDICARE

## 2023-07-23 VITALS
BODY MASS INDEX: 43.75 KG/M2 | TEMPERATURE: 98 F | OXYGEN SATURATION: 97 % | HEART RATE: 80 BPM | DIASTOLIC BLOOD PRESSURE: 66 MMHG | RESPIRATION RATE: 24 BRPM | WEIGHT: 224 LBS | SYSTOLIC BLOOD PRESSURE: 139 MMHG

## 2023-07-23 DIAGNOSIS — R42 DIZZINESS: ICD-10-CM

## 2023-07-23 DIAGNOSIS — N17.9 AKI (ACUTE KIDNEY INJURY): ICD-10-CM

## 2023-07-23 DIAGNOSIS — R06.02 SHORTNESS OF BREATH: ICD-10-CM

## 2023-07-23 DIAGNOSIS — R51.9 NONINTRACTABLE HEADACHE, UNSPECIFIED CHRONICITY PATTERN, UNSPECIFIED HEADACHE TYPE: Primary | ICD-10-CM

## 2023-07-23 LAB
ALBUMIN SERPL BCP-MCNC: 4.7 G/DL (ref 3.5–5.2)
ALP SERPL-CCNC: 62 U/L (ref 55–135)
ALT SERPL W/O P-5'-P-CCNC: 34 U/L (ref 10–44)
ANION GAP SERPL CALC-SCNC: 19 MMOL/L (ref 8–16)
AST SERPL-CCNC: 23 U/L (ref 10–40)
BASOPHILS # BLD AUTO: 0.02 K/UL (ref 0–0.2)
BASOPHILS NFR BLD: 0.3 % (ref 0–1.9)
BILIRUB SERPL-MCNC: 0.4 MG/DL (ref 0.1–1)
BILIRUB UR QL STRIP: NEGATIVE
BNP SERPL-MCNC: <10 PG/ML (ref 0–99)
BUN SERPL-MCNC: 32 MG/DL (ref 8–23)
CALCIUM SERPL-MCNC: 10.5 MG/DL (ref 8.7–10.5)
CHLORIDE SERPL-SCNC: 100 MMOL/L (ref 95–110)
CLARITY UR: CLEAR
CO2 SERPL-SCNC: 22 MMOL/L (ref 23–29)
COLOR UR: COLORLESS
CREAT SERPL-MCNC: 1.6 MG/DL (ref 0.5–1.4)
D DIMER PPP IA.FEU-MCNC: 0.65 MG/L FEU
DIFFERENTIAL METHOD: ABNORMAL
EOSINOPHIL # BLD AUTO: 0.1 K/UL (ref 0–0.5)
EOSINOPHIL NFR BLD: 1.7 % (ref 0–8)
ERYTHROCYTE [DISTWIDTH] IN BLOOD BY AUTOMATED COUNT: 15 % (ref 11.5–14.5)
EST. GFR  (NO RACE VARIABLE): 35 ML/MIN/1.73 M^2
GLUCOSE SERPL-MCNC: 101 MG/DL (ref 70–110)
GLUCOSE UR QL STRIP: NEGATIVE
HCT VFR BLD AUTO: 37.3 % (ref 37–48.5)
HGB BLD-MCNC: 12.2 G/DL (ref 12–16)
HGB UR QL STRIP: NEGATIVE
IMM GRANULOCYTES # BLD AUTO: 0.02 K/UL (ref 0–0.04)
IMM GRANULOCYTES NFR BLD AUTO: 0.3 % (ref 0–0.5)
KETONES UR QL STRIP: NEGATIVE
LEUKOCYTE ESTERASE UR QL STRIP: NEGATIVE
LYMPHOCYTES # BLD AUTO: 3.9 K/UL (ref 1–4.8)
LYMPHOCYTES NFR BLD: 54.7 % (ref 18–48)
MCH RBC QN AUTO: 27.9 PG (ref 27–31)
MCHC RBC AUTO-ENTMCNC: 32.7 G/DL (ref 32–36)
MCV RBC AUTO: 85 FL (ref 82–98)
MONOCYTES # BLD AUTO: 0.5 K/UL (ref 0.3–1)
MONOCYTES NFR BLD: 6.7 % (ref 4–15)
NEUTROPHILS # BLD AUTO: 2.6 K/UL (ref 1.8–7.7)
NEUTROPHILS NFR BLD: 36.3 % (ref 38–73)
NITRITE UR QL STRIP: NEGATIVE
NRBC BLD-RTO: 0 /100 WBC
PH UR STRIP: 6 [PH] (ref 5–8)
PLATELET # BLD AUTO: 267 K/UL (ref 150–450)
PMV BLD AUTO: 10.1 FL (ref 9.2–12.9)
POTASSIUM SERPL-SCNC: 4.2 MMOL/L (ref 3.5–5.1)
PROT SERPL-MCNC: 8.5 G/DL (ref 6–8.4)
PROT UR QL STRIP: NEGATIVE
RBC # BLD AUTO: 4.38 M/UL (ref 4–5.4)
SODIUM SERPL-SCNC: 141 MMOL/L (ref 136–145)
SP GR UR STRIP: 1.03 (ref 1–1.03)
TROPONIN I SERPL DL<=0.01 NG/ML-MCNC: <0.006 NG/ML (ref 0–0.03)
URN SPEC COLLECT METH UR: ABNORMAL
UROBILINOGEN UR STRIP-ACNC: NEGATIVE EU/DL
WBC # BLD AUTO: 7.13 K/UL (ref 3.9–12.7)

## 2023-07-23 PROCEDURE — 85025 COMPLETE CBC W/AUTO DIFF WBC: CPT

## 2023-07-23 PROCEDURE — 93005 ELECTROCARDIOGRAM TRACING: CPT

## 2023-07-23 PROCEDURE — 63600175 PHARM REV CODE 636 W HCPCS

## 2023-07-23 PROCEDURE — 25500020 PHARM REV CODE 255: Performed by: EMERGENCY MEDICINE

## 2023-07-23 PROCEDURE — 80053 COMPREHEN METABOLIC PANEL: CPT

## 2023-07-23 PROCEDURE — 93010 ELECTROCARDIOGRAM REPORT: CPT | Mod: ,,, | Performed by: INTERNAL MEDICINE

## 2023-07-23 PROCEDURE — 85379 FIBRIN DEGRADATION QUANT: CPT

## 2023-07-23 PROCEDURE — 25000003 PHARM REV CODE 250

## 2023-07-23 PROCEDURE — 83880 ASSAY OF NATRIURETIC PEPTIDE: CPT

## 2023-07-23 PROCEDURE — 99285 EMERGENCY DEPT VISIT HI MDM: CPT | Mod: 25

## 2023-07-23 PROCEDURE — 93010 EKG 12-LEAD: ICD-10-PCS | Mod: ,,, | Performed by: INTERNAL MEDICINE

## 2023-07-23 PROCEDURE — 84484 ASSAY OF TROPONIN QUANT: CPT

## 2023-07-23 PROCEDURE — 81003 URINALYSIS AUTO W/O SCOPE: CPT

## 2023-07-23 PROCEDURE — 96361 HYDRATE IV INFUSION ADD-ON: CPT

## 2023-07-23 PROCEDURE — 96374 THER/PROPH/DIAG INJ IV PUSH: CPT

## 2023-07-23 RX ORDER — ACETAMINOPHEN 500 MG
1000 TABLET ORAL
Status: COMPLETED | OUTPATIENT
Start: 2023-07-23 | End: 2023-07-23

## 2023-07-23 RX ORDER — ONDANSETRON 2 MG/ML
4 INJECTION INTRAMUSCULAR; INTRAVENOUS
Status: COMPLETED | OUTPATIENT
Start: 2023-07-23 | End: 2023-07-23

## 2023-07-23 RX ADMIN — ACETAMINOPHEN 1000 MG: 500 TABLET ORAL at 06:07

## 2023-07-23 RX ADMIN — IOHEXOL 100 ML: 350 INJECTION, SOLUTION INTRAVENOUS at 06:07

## 2023-07-23 RX ADMIN — SODIUM CHLORIDE 500 ML: 9 INJECTION, SOLUTION INTRAVENOUS at 05:07

## 2023-07-23 RX ADMIN — ONDANSETRON HYDROCHLORIDE 4 MG: 2 SOLUTION INTRAMUSCULAR; INTRAVENOUS at 06:07

## 2023-07-23 NOTE — ED TRIAGE NOTES
Pt to 15 with c/o SOB, dizziness and a headache X3 days. Pt states a pain on the left posterior of her head at a 7 out of 10. Pt resting on stretcher; no family at bedside. AAOX4. NADN. VitalsWNL. Pt placed on caediac monitor. Respirations even and non labored, skin warm, dry, and intact.

## 2023-07-23 NOTE — ED PROVIDER NOTES
Encounter Date: 7/23/2023       History     Chief Complaint   Patient presents with    Dizziness     Intermittent dizziness, HA, and nausea x 3 days      68 year old female with PMHx HTN, HLD, DM, cataract, arthritis, CHF, presents to the ED with headache.  Patient states headache has waxed and waned for the past 3 days, 7/10 in severity, located to posterior head. SOB associated with headache, also intermittent. She has been experiencing dizziness for months, was seen by ENT, completed physical therapy and reports no improvement of symptoms. Has also tried meclizine that does not help symptoms. She is not dizzy at this time, states it occurs when she is walking and feels off balance. Denies room spinning sensation, lightheadedness. Denies fever, chills, vision changes, calf pain, stasis, hemoptysis, vomiting. She does occasionally take aleve. No head trauma or falls.     The history is provided by the patient.   Review of patient's allergies indicates:   Allergen Reactions    Codeine Nausea And Vomiting    Lisinopril      Past Medical History:   Diagnosis Date    Arthritis     Cataract     Diabetes mellitus     Hyperlipidemia     Hypertension     Pre-diabetes     Sciatica      Past Surgical History:   Procedure Laterality Date    HYSTERECTOMY      LEG SURGERY      TONSILLECTOMY       Family History   Problem Relation Age of Onset    Diabetes Mother     Glaucoma Mother     No Known Problems Father     No Known Problems Sister     No Known Problems Brother     No Known Problems Maternal Aunt     No Known Problems Maternal Uncle     No Known Problems Paternal Aunt     No Known Problems Paternal Uncle     No Known Problems Maternal Grandmother     No Known Problems Maternal Grandfather     No Known Problems Paternal Grandmother     No Known Problems Paternal Grandfather     Amblyopia Neg Hx     Blindness Neg Hx     Cancer Neg Hx     Cataracts Neg Hx     Hypertension Neg Hx     Macular degeneration Neg Hx     Retinal  detachment Neg Hx     Strabismus Neg Hx     Stroke Neg Hx     Thyroid disease Neg Hx      Social History     Tobacco Use    Smoking status: Never    Smokeless tobacco: Never   Substance Use Topics    Alcohol use: No    Drug use: Never     Review of Systems   Constitutional:  Negative for chills and fever.   HENT:  Negative for congestion, ear discharge, ear pain, postnasal drip, rhinorrhea and sinus pressure.    Eyes:  Negative for photophobia and visual disturbance.   Respiratory:  Positive for shortness of breath (not at this time). Negative for cough and wheezing.    Cardiovascular:  Negative for chest pain, palpitations and leg swelling.   Gastrointestinal:  Positive for nausea (intermittent, none at this time). Negative for abdominal pain and vomiting.   Genitourinary:  Negative for dysuria and hematuria.   Musculoskeletal:  Negative for neck pain.   Skin:  Negative for color change.   Neurological:  Positive for dizziness (not at this time) and headaches (7/10). Negative for facial asymmetry, speech difficulty, weakness, light-headedness and numbness.   Psychiatric/Behavioral:  Negative for agitation and confusion.      Physical Exam     Initial Vitals [07/23/23 1520]   BP Pulse Resp Temp SpO2   (!) 113/58 79 18 97.9 °F (36.6 °C) 100 %      MAP       --         Physical Exam    Nursing note and vitals reviewed.  Constitutional: She appears well-developed and well-nourished. She is not diaphoretic.  Non-toxic appearance. No distress.   HENT:   Head: Normocephalic and atraumatic.   Right Ear: Hearing, tympanic membrane, external ear and ear canal normal.   Left Ear: Hearing, tympanic membrane, external ear and ear canal normal.   Nose: Nose normal.   Mouth/Throat: Uvula is midline and oropharynx is clear and moist.   Eyes: Conjunctivae and EOM are normal. Pupils are equal, round, and reactive to light.   Neck: Neck supple.   Normal range of motion.  Cardiovascular:  Normal rate, regular rhythm and normal heart  sounds.           Pulmonary/Chest: Breath sounds normal. No respiratory distress. She has no wheezes. She has no rales.   Abdominal: Abdomen is soft. She exhibits no distension. There is no abdominal tenderness.   Musculoskeletal:         General: Normal range of motion.      Cervical back: Normal range of motion and neck supple. Normal range of motion.      Comments: Tenderness to left tibial tuberosity.  No calf tenderness.  No erythema, edema, color changes.  Bilateral LE compartments soft and compressible     Neurological: She is alert and oriented to person, place, and time. She has normal strength. No cranial nerve deficit or sensory deficit. Coordination and gait normal. GCS score is 15. GCS eye subscore is 4. GCS verbal subscore is 5. GCS motor subscore is 6.   AAOx3. Recent and remote memory normal. Attention span and concentration normal. Cranial nerves intact. Normal coordination (finger to nose with no missed endpoints, normal rapid alternating movement, normal heel to shin). Normal tone. Sensation in tact to light touch BUE, BLE. 5/5 strength to BUE, BLE.     Skin: Skin is warm and dry.   Psychiatric: She has a normal mood and affect. Her behavior is normal. Judgment and thought content normal.       ED Course   Procedures  Labs Reviewed   CBC W/ AUTO DIFFERENTIAL - Abnormal; Notable for the following components:       Result Value    RDW 15.0 (*)     Gran % 36.3 (*)     Lymph % 54.7 (*)     All other components within normal limits   COMPREHENSIVE METABOLIC PANEL - Abnormal; Notable for the following components:    CO2 22 (*)     BUN 32 (*)     Creatinine 1.6 (*)     Total Protein 8.5 (*)     eGFR 35 (*)     Anion Gap 19 (*)     All other components within normal limits   D DIMER, QUANTITATIVE - Abnormal; Notable for the following components:    D-Dimer 0.65 (*)     All other components within normal limits   URINALYSIS, REFLEX TO URINE CULTURE - Abnormal; Notable for the following components:     Color, UA Colorless (*)     All other components within normal limits    Narrative:     Specimen Source->Urine   B-TYPE NATRIURETIC PEPTIDE   TROPONIN I   B-TYPE NATRIURETIC PEPTIDE   TROPONIN I          Imaging Results              CTA Chest Non-Coronary (PE Studies) (Final result)  Result time 07/23/23 19:48:44      Final result by Salvador Pruitt MD (07/23/23 19:48:44)                   Impression:      1. No evidence of acute pulmonary thromboembolism.  2. No acute findings elsewhere in the chest.      Electronically signed by: Salvador Pruitt  Date:    07/23/2023  Time:    19:48               Narrative:    EXAMINATION:  CTA CHEST NON CORONARY (PE STUDIES)    CLINICAL HISTORY:  Pulmonary embolism (PE) suspected, positive D-dimer;    TECHNIQUE:  Following the intravenous administration of 75 cc of Omnipaque 350 contrast material, 1.25 mm contiguous axial images were acquired through the chest utilizing the CT pulmonary angiogram protocol.  2-D coronal and sagittal reconstructed images of the chest and sagittal oblique MIP images of the pulmonary arterial system were generated from the source data.    COMPARISON:  None.    FINDINGS:  Pulmonary arterial system: This is a good quality examination for the evaluation of pulmonary thromboembolism with good opacification of the pulmonary arteries to the level of the segmental branches of the pulmonary arterial system. There is no evidence of acute pulmonary thromboembolism. No large saddle pulmonary embolism, enlargement of the main pulmonary artery, or secondary findings of right ventricular strain.    Aorta and heart: The heart is normal in size.  No pericardial fluid.  No thoracic aortic aneurysm.  No thoracic aortic dissection.    Airways: Unremarkable.    Lymph nodes: No pathologically enlarged lymph nodes in the chest or axilla.    Lungs: The lungs are clear with no evidence of significant airspace consolidation, mass, or bronchiectasis.  No emphysematous lung  architecture.  Minimal band of fibrosis or atelectasis in the lingula.    Pleura: No significant volume of pleural fluid or pneumothorax.  No pleural based masses.    Visualized upper abdominal soft tissues: No significant abnormalities appreciated.    Bones: There is degenerative change of the thoracic spine.                                       CT Head Without Contrast (Final result)  Result time 07/23/23 19:38:01      Final result by Tomas Catalan MD (07/23/23 19:38:01)                   Impression:      1. No acute intracranial abnormalities noting sequela of chronic microvascular ischemic change and senescent change.      Electronically signed by: Tomas Catalan MD  Date:    07/23/2023  Time:    19:38               Narrative:    EXAMINATION:  CT HEAD WITHOUT CONTRAST    CLINICAL HISTORY:  Dizziness, persistent/recurrent, cardiac or vascular cause suspected;Headache, new or worsening (Age >= 50y);    TECHNIQUE:  Low dose axial images were obtained through the head.  Coronal and sagittal reformations were also performed. Contrast was not administered.    COMPARISON:  05/15/2013    FINDINGS:  There is generalized cerebral volume loss.  There is hypoattenuation in a periventricular fashion, likely sequela of chronic microvascular ischemic change.  There is no evidence of acute major vascular territory infarct, hemorrhage, or mass.  There is no hydrocephalus.  There are no abnormal extra-axial fluid collections.  The paranasal sinuses and mastoid air cells are clear, and there is no evidence of calvarial fracture.  The visualized soft tissues are unremarkable.                                       X-Ray Chest PA And Lateral (Final result)  Result time 07/23/23 16:49:37      Final result by Gilson Curiel MD (07/23/23 16:49:37)                   Impression:      No acute cardiopulmonary process.      Electronically signed by: Gilson Curiel MD  Date:    07/23/2023  Time:    16:49               Narrative:     EXAMINATION:  XR CHEST PA AND LATERAL    CLINICAL HISTORY:  Shortness of breath    TECHNIQUE:  PA and lateral views of the chest were performed.    COMPARISON:  01/09/2023    FINDINGS:  There is no consolidation, effusion, or pneumothorax.  Cardiomediastinal silhouette is unremarkable.  Regional osseous structures are unremarkable.                                       Medications   sodium chloride 0.9% bolus 500 mL 500 mL (0 mLs Intravenous Stopped 7/23/23 1837)   acetaminophen tablet 1,000 mg (1,000 mg Oral Given 7/23/23 1810)   ondansetron injection 4 mg (4 mg Intravenous Given 7/23/23 1810)   iohexoL (OMNIPAQUE 350) injection 100 mL (100 mLs Intravenous Given 7/23/23 1849)     Medical Decision Making:   Initial Assessment:   Well-appearing 68-year-old female with intermittent headache for 3 days. Vitals WNL. Headache waxes and wane, currently 7/10 in severity. Intermittent SOB. Lungs CTAB. Normal O2 saturations. Dizziness when walking for months, none at this time.  Differential Diagnosis:   Differential Diagnosis includes, but is not limited to:  Peripheral vertigo (labyrinthitis, vestibular neuritis, BPPV, Meniere's disease), cerebellar stroke/CVA, TIA, SAH, carotid artery dissection, intracranial mass, medication reaction/noncompliance, substance abuse, depression, electrolyte abnormality, anemia, hemorrhage, renal failure, hepatic failure, sepsis/infection, UTI, viral illness, arrhythmia, CHF, thyroid disease, dehydration, depression, chronic disease.         APC / Resident Notes:   I have reviewed the case with my  MERISSA and agree with the history, review of systems, physical exam, assessment, and plan of care as documented. I have also physically saw the patient and performed an independent HPI and exam.        ED Course as of 07/24/23 0309   Sun Jul 23, 2023   1648 CBC auto differential(!)  No leukocytosis. Stable H&H [CS]   1651 D-Dimer(!): 0.65  Will proceed with CTA chest due to intermittent SOB. [CS]    1726 Creatinine(!): 1.6  [place]. Will give 500 ml IVF. Patient does have history of CHF for which she takes lasix. No signs of volume overload at this time. [CS]   1727 eGFR(!): 35  Decreased from prior. Baseline approx 55? [CS]   1727 Anion Gap(!): 19 [CS]   1728 CO2(!): 22 [CS]   1728 X-Ray Chest PA And Lateral  No acute cardiopulmonary process. [CS]   1743 Troponin I: <0.006  WNL [CS]   1830 BNP: <10  WNL [CS]   1940 CT Head Without Contrast  1. No acute intracranial abnormalities noting sequela of chronic microvascular ischemic change and senescent change. [CS]   1952 CTA Chest Non-Coronary (PE Studies)  1. No evidence of acute pulmonary thromboembolism.  2. No acute findings elsewhere in the chest. [CS]   1953 Patient reports marked improvement of headache. No CP, SOB, dizziness at this time. Discussed all results in detail with patient. Patient to schedule follow up with PCP. ED return precautions discussed with patient.     Discussed patient with Dr. Rasmussen at end of my shift. Pending UA.  [CS]   2039 Patient ambulatory to restroom at this time with stable gait.  [CS]   2147 Urinalysis, Reflex to Urine Culture Urine, Clean Catch(!)  Pt is currently stable for discharge.   I see no indication of an emergent process beyond that addressed during our encounter but have duly counseled the patient/family regarding the need for prompt follow-up as well as the indications that should prompt immediate return to the emergency room should new or worrisome developments occur. I discussed the ED work up and diagnostic findings with the patient. The patient/family has been provided with verbal and printed direction regarding our final diagnosis(es) as well as instructions regarding use of OTC and/or Rx medications intended to manage the patient's aforementioned conditions. The patient/family verbalized an understanding. The patient/family is asked if there are any questions or concerns. We discuss the case, until all issues  are addressed to the patient/family's satisfaction. Patient/family understands and is agreeable to the plan.  [AS]      ED Course User Index  [AS] Stan Rasmussen MD  [CS] Megha Kumar PA-C                 Clinical Impression:   Final diagnoses:  [R42] Dizziness  [R06.02] Shortness of breath  [R51.9] Nonintractable headache, unspecified chronicity pattern, unspecified headache type (Primary)  [N17.9] DALLAS (acute kidney injury)        ED Disposition Condition    Discharge Stable          ED Prescriptions    None       Follow-up Information       Follow up With Specialties Details Why Contact Info    [facility] Emergency Dept Emergency Medicine  If symptoms worsen [address] [facility] [address] [phone]    Primary care doctor  Schedule an appointment as soon as possible for a visit  for reassesment              Megha Kumar PA-C  07/23/23 0719       Stan Rasmussen MD  07/24/23 4214

## 2023-07-24 NOTE — DISCHARGE INSTRUCTIONS
Thank you for coming to our Emergency Department today. It is important to remember that some problems are difficult to diagnose and may not be found during your first visit. Be sure to follow up with your primary care doctor and review any labs/imaging that was performed with them. If you do not have a primary care doctor, you may contact the one listed on your discharge paperwork or you may also call the [facility] Appointment Desk at [phone] to schedule an appointment with one.     All medications may potentially have side effects and it is impossible to predict which medications may give you side effects. If you feel that you are having a negative effect of any medication you should immediately stop taking them and seek medical attention.    Return to the ER with any questions/concerns, new/concerning symptoms, worsening or failure to improve. Do not drive or make any important decisions for 24 hours if you have received any pain medications, sedatives or mood altering drugs during your ER visit.

## 2024-04-21 ENCOUNTER — HOSPITAL ENCOUNTER (OUTPATIENT)
Facility: HOSPITAL | Age: 69
Discharge: HOME | End: 2024-04-22
Attending: EMERGENCY MEDICINE | Admitting: HOSPITALIST
Payer: MEDICARE

## 2024-04-21 DIAGNOSIS — Z86.79 HISTORY OF CAD (CORONARY ARTERY DISEASE): ICD-10-CM

## 2024-04-21 DIAGNOSIS — R03.0 ELEVATED BLOOD PRESSURE READING: ICD-10-CM

## 2024-04-21 DIAGNOSIS — R06.02 SOB (SHORTNESS OF BREATH): ICD-10-CM

## 2024-04-21 DIAGNOSIS — M54.2 NECK PAIN ON LEFT SIDE: ICD-10-CM

## 2024-04-21 DIAGNOSIS — R07.9 CHEST PAIN: Primary | ICD-10-CM

## 2024-04-21 DIAGNOSIS — M54.9 UPPER BACK PAIN: ICD-10-CM

## 2024-04-21 PROBLEM — E66.01 SEVERE OBESITY (BMI >= 40): Status: ACTIVE | Noted: 2024-04-21

## 2024-04-21 PROBLEM — E11.69 OBESITY, DIABETES, AND HYPERTENSION SYNDROME: Status: ACTIVE | Noted: 2024-04-21

## 2024-04-21 PROBLEM — E66.9 OBESITY, DIABETES, AND HYPERTENSION SYNDROME: Status: ACTIVE | Noted: 2024-04-21

## 2024-04-21 PROBLEM — I11.0 BENIGN HYPERTENSIVE HEART DISEASE WITH HF (HEART FAILURE): Status: ACTIVE | Noted: 2024-04-21

## 2024-04-21 PROBLEM — I15.2 OBESITY, DIABETES, AND HYPERTENSION SYNDROME: Status: ACTIVE | Noted: 2024-04-21

## 2024-04-21 PROBLEM — E11.59 OBESITY, DIABETES, AND HYPERTENSION SYNDROME: Status: ACTIVE | Noted: 2024-04-21

## 2024-04-21 PROBLEM — I50.32 CHRONIC DIASTOLIC HEART FAILURE: Status: ACTIVE | Noted: 2019-03-18

## 2024-04-21 PROBLEM — I25.10 CAD (CORONARY ARTERY DISEASE), NATIVE CORONARY ARTERY: Status: ACTIVE | Noted: 2024-04-21

## 2024-04-21 LAB
ALBUMIN SERPL BCP-MCNC: 4.4 G/DL (ref 3.5–5.2)
ALLENS TEST: ABNORMAL
ALLENS TEST: NORMAL
ALP SERPL-CCNC: 57 U/L (ref 55–135)
ALT SERPL W/O P-5'-P-CCNC: 16 U/L (ref 10–44)
ANION GAP SERPL CALC-SCNC: 10 MMOL/L (ref 8–16)
AST SERPL-CCNC: 15 U/L (ref 10–40)
BASOPHILS # BLD AUTO: 0.02 K/UL (ref 0–0.2)
BASOPHILS NFR BLD: 0.4 % (ref 0–1.9)
BILIRUB SERPL-MCNC: 0.4 MG/DL (ref 0.1–1)
BNP SERPL-MCNC: 20 PG/ML (ref 0–99)
BUN SERPL-MCNC: 18 MG/DL (ref 6–30)
BUN SERPL-MCNC: 18 MG/DL (ref 8–23)
CALCIUM SERPL-MCNC: 10.4 MG/DL (ref 8.7–10.5)
CHLORIDE SERPL-SCNC: 103 MMOL/L (ref 95–110)
CHLORIDE SERPL-SCNC: 104 MMOL/L (ref 95–110)
CO2 SERPL-SCNC: 23 MMOL/L (ref 23–29)
CREAT SERPL-MCNC: 0.9 MG/DL (ref 0.5–1.4)
CREAT SERPL-MCNC: 1 MG/DL (ref 0.5–1.4)
D DIMER PPP IA.FEU-MCNC: 0.38 MG/L FEU
DIFFERENTIAL METHOD BLD: ABNORMAL
EOSINOPHIL # BLD AUTO: 0.1 K/UL (ref 0–0.5)
EOSINOPHIL NFR BLD: 1.3 % (ref 0–8)
ERYTHROCYTE [DISTWIDTH] IN BLOOD BY AUTOMATED COUNT: 14 % (ref 11.5–14.5)
EST. GFR  (NO RACE VARIABLE): >60 ML/MIN/1.73 M^2
ESTIMATED AVG GLUCOSE: 126 MG/DL (ref 68–131)
GLUCOSE SERPL-MCNC: 85 MG/DL (ref 70–110)
GLUCOSE SERPL-MCNC: 87 MG/DL (ref 70–110)
HBA1C MFR BLD: 6 % (ref 4–5.6)
HCO3 UR-SCNC: 27.1 MMOL/L (ref 24–28)
HCT VFR BLD AUTO: 39 % (ref 37–48.5)
HCT VFR BLD CALC: 38 %PCV (ref 36–54)
HCV AB SERPL QL IA: NORMAL
HGB BLD-MCNC: 12.1 G/DL (ref 12–16)
HIV 1+2 AB+HIV1 P24 AG SERPL QL IA: NORMAL
IMM GRANULOCYTES # BLD AUTO: 0.01 K/UL (ref 0–0.04)
IMM GRANULOCYTES NFR BLD AUTO: 0.2 % (ref 0–0.5)
LDH SERPL L TO P-CCNC: 1.55 MMOL/L (ref 0.5–2.2)
LIPASE SERPL-CCNC: 58 U/L (ref 4–60)
LYMPHOCYTES # BLD AUTO: 3.2 K/UL (ref 1–4.8)
LYMPHOCYTES NFR BLD: 58.6 % (ref 18–48)
MCH RBC QN AUTO: 26.8 PG (ref 27–31)
MCHC RBC AUTO-ENTMCNC: 31 G/DL (ref 32–36)
MCV RBC AUTO: 86 FL (ref 82–98)
MONOCYTES # BLD AUTO: 0.4 K/UL (ref 0.3–1)
MONOCYTES NFR BLD: 6.9 % (ref 4–15)
NEUTROPHILS # BLD AUTO: 1.8 K/UL (ref 1.8–7.7)
NEUTROPHILS NFR BLD: 32.6 % (ref 38–73)
NRBC BLD-RTO: 0 /100 WBC
PCO2 BLDA: 50 MMHG (ref 35–45)
PH SMN: 7.34 [PH] (ref 7.35–7.45)
PLATELET # BLD AUTO: 262 K/UL (ref 150–450)
PMV BLD AUTO: 10.5 FL (ref 9.2–12.9)
PO2 BLDA: 25 MMHG (ref 40–60)
POC BE: 1 MMOL/L
POC CARDIAC TROPONIN I: 0 NG/ML (ref 0–0.08)
POC IONIZED CALCIUM: 1.36 MMOL/L (ref 1.06–1.42)
POC SATURATED O2: 41 % (ref 95–100)
POC TCO2 (MEASURED): 27 MMOL/L (ref 23–29)
POC TCO2: 29 MMOL/L (ref 24–29)
POCT GLUCOSE: 86 MG/DL (ref 70–110)
POTASSIUM BLD-SCNC: 4.1 MMOL/L (ref 3.5–5.1)
POTASSIUM SERPL-SCNC: 4.3 MMOL/L (ref 3.5–5.1)
PROT SERPL-MCNC: 7.8 G/DL (ref 6–8.4)
RBC # BLD AUTO: 4.52 M/UL (ref 4–5.4)
SAMPLE: ABNORMAL
SAMPLE: NORMAL
SITE: ABNORMAL
SITE: NORMAL
SODIUM BLD-SCNC: 140 MMOL/L (ref 136–145)
SODIUM SERPL-SCNC: 137 MMOL/L (ref 136–145)
TROPONIN I SERPL DL<=0.01 NG/ML-MCNC: <0.006 NG/ML (ref 0–0.03)
TROPONIN I SERPL DL<=0.01 NG/ML-MCNC: <0.006 NG/ML (ref 0–0.03)
WBC # BLD AUTO: 5.53 K/UL (ref 3.9–12.7)

## 2024-04-21 PROCEDURE — 99285 EMERGENCY DEPT VISIT HI MDM: CPT | Mod: 25

## 2024-04-21 PROCEDURE — 80047 BASIC METABLC PNL IONIZED CA: CPT

## 2024-04-21 PROCEDURE — 93005 ELECTROCARDIOGRAM TRACING: CPT

## 2024-04-21 PROCEDURE — 82308 ASSAY OF CALCITONIN: CPT

## 2024-04-21 PROCEDURE — G0378 HOSPITAL OBSERVATION PER HR: HCPCS

## 2024-04-21 PROCEDURE — 82803 BLOOD GASES ANY COMBINATION: CPT

## 2024-04-21 PROCEDURE — 83880 ASSAY OF NATRIURETIC PEPTIDE: CPT | Performed by: PHYSICIAN ASSISTANT

## 2024-04-21 PROCEDURE — 83690 ASSAY OF LIPASE: CPT | Performed by: PHYSICIAN ASSISTANT

## 2024-04-21 PROCEDURE — 86803 HEPATITIS C AB TEST: CPT | Performed by: PHYSICIAN ASSISTANT

## 2024-04-21 PROCEDURE — 83605 ASSAY OF LACTIC ACID: CPT

## 2024-04-21 PROCEDURE — 83036 HEMOGLOBIN GLYCOSYLATED A1C: CPT | Performed by: PHYSICIAN ASSISTANT

## 2024-04-21 PROCEDURE — 25000003 PHARM REV CODE 250: Performed by: PHYSICIAN ASSISTANT

## 2024-04-21 PROCEDURE — 87389 HIV-1 AG W/HIV-1&-2 AB AG IA: CPT | Performed by: PHYSICIAN ASSISTANT

## 2024-04-21 PROCEDURE — 93010 ELECTROCARDIOGRAM REPORT: CPT | Mod: ,,, | Performed by: INTERNAL MEDICINE

## 2024-04-21 PROCEDURE — 25500020 PHARM REV CODE 255: Performed by: EMERGENCY MEDICINE

## 2024-04-21 PROCEDURE — 99900035 HC TECH TIME PER 15 MIN (STAT)

## 2024-04-21 PROCEDURE — 85379 FIBRIN DEGRADATION QUANT: CPT | Performed by: PHYSICIAN ASSISTANT

## 2024-04-21 PROCEDURE — 85025 COMPLETE CBC W/AUTO DIFF WBC: CPT | Performed by: PHYSICIAN ASSISTANT

## 2024-04-21 PROCEDURE — 84484 ASSAY OF TROPONIN QUANT: CPT | Mod: 91 | Performed by: PHYSICIAN ASSISTANT

## 2024-04-21 PROCEDURE — 84484 ASSAY OF TROPONIN QUANT: CPT

## 2024-04-21 PROCEDURE — 25000003 PHARM REV CODE 250: Performed by: HOSPITALIST

## 2024-04-21 PROCEDURE — 80053 COMPREHEN METABOLIC PANEL: CPT | Performed by: PHYSICIAN ASSISTANT

## 2024-04-21 RX ORDER — ATORVASTATIN CALCIUM 40 MG/1
80 TABLET, FILM COATED ORAL NIGHTLY
Status: DISCONTINUED | OUTPATIENT
Start: 2024-04-21 | End: 2024-04-22 | Stop reason: HOSPADM

## 2024-04-21 RX ORDER — TALC
6 POWDER (GRAM) TOPICAL NIGHTLY PRN
Status: DISCONTINUED | OUTPATIENT
Start: 2024-04-21 | End: 2024-04-22 | Stop reason: HOSPADM

## 2024-04-21 RX ORDER — NITROGLYCERIN 0.4 MG/1
0.4 TABLET SUBLINGUAL EVERY 5 MIN PRN
Status: DISCONTINUED | OUTPATIENT
Start: 2024-04-21 | End: 2024-04-22 | Stop reason: HOSPADM

## 2024-04-21 RX ORDER — GLUCAGON 1 MG
1 KIT INJECTION
Status: DISCONTINUED | OUTPATIENT
Start: 2024-04-21 | End: 2024-04-22 | Stop reason: HOSPADM

## 2024-04-21 RX ORDER — SODIUM CHLORIDE 0.9 % (FLUSH) 0.9 %
5 SYRINGE (ML) INJECTION
Status: DISCONTINUED | OUTPATIENT
Start: 2024-04-21 | End: 2024-04-22 | Stop reason: HOSPADM

## 2024-04-21 RX ORDER — NALOXONE HCL 0.4 MG/ML
0.02 VIAL (ML) INJECTION
Status: DISCONTINUED | OUTPATIENT
Start: 2024-04-21 | End: 2024-04-22 | Stop reason: HOSPADM

## 2024-04-21 RX ORDER — NAPROXEN SODIUM 220 MG/1
81 TABLET, FILM COATED ORAL DAILY
Status: DISCONTINUED | OUTPATIENT
Start: 2024-04-22 | End: 2024-04-22 | Stop reason: HOSPADM

## 2024-04-21 RX ORDER — LOSARTAN POTASSIUM 50 MG/1
100 TABLET ORAL DAILY
Status: DISCONTINUED | OUTPATIENT
Start: 2024-04-22 | End: 2024-04-22 | Stop reason: HOSPADM

## 2024-04-21 RX ORDER — INSULIN ASPART 100 [IU]/ML
0-5 INJECTION, SOLUTION INTRAVENOUS; SUBCUTANEOUS
Status: DISCONTINUED | OUTPATIENT
Start: 2024-04-21 | End: 2024-04-22 | Stop reason: HOSPADM

## 2024-04-21 RX ORDER — IBUPROFEN 200 MG
24 TABLET ORAL
Status: DISCONTINUED | OUTPATIENT
Start: 2024-04-21 | End: 2024-04-21

## 2024-04-21 RX ORDER — SPIRONOLACTONE 25 MG/1
50 TABLET ORAL DAILY
Status: DISCONTINUED | OUTPATIENT
Start: 2024-04-22 | End: 2024-04-22 | Stop reason: HOSPADM

## 2024-04-21 RX ORDER — IBUPROFEN 200 MG
600 TABLET ORAL EVERY 6 HOURS PRN
Status: DISCONTINUED | OUTPATIENT
Start: 2024-04-21 | End: 2024-04-22 | Stop reason: HOSPADM

## 2024-04-21 RX ORDER — IBUPROFEN 200 MG
16 TABLET ORAL
Status: DISCONTINUED | OUTPATIENT
Start: 2024-04-21 | End: 2024-04-21

## 2024-04-21 RX ORDER — GLUCAGON 1 MG
1 KIT INJECTION
Status: DISCONTINUED | OUTPATIENT
Start: 2024-04-21 | End: 2024-04-21

## 2024-04-21 RX ORDER — ACETAMINOPHEN 325 MG/1
650 TABLET ORAL EVERY 4 HOURS PRN
Status: DISCONTINUED | OUTPATIENT
Start: 2024-04-21 | End: 2024-04-22 | Stop reason: HOSPADM

## 2024-04-21 RX ORDER — CALCIUM CARBONATE 200(500)MG
1000 TABLET,CHEWABLE ORAL 3 TIMES DAILY PRN
Status: DISCONTINUED | OUTPATIENT
Start: 2024-04-21 | End: 2024-04-22 | Stop reason: HOSPADM

## 2024-04-21 RX ORDER — PROPRANOLOL HYDROCHLORIDE 80 MG/1
80 TABLET ORAL 2 TIMES DAILY
COMMUNITY
Start: 2024-03-15

## 2024-04-21 RX ORDER — PRASUGREL 10 MG/1
10 TABLET, FILM COATED ORAL DAILY
Status: DISCONTINUED | OUTPATIENT
Start: 2024-04-22 | End: 2024-04-22 | Stop reason: HOSPADM

## 2024-04-21 RX ORDER — PANTOPRAZOLE SODIUM 40 MG/1
40 TABLET, DELAYED RELEASE ORAL DAILY
Status: DISCONTINUED | OUTPATIENT
Start: 2024-04-22 | End: 2024-04-22 | Stop reason: HOSPADM

## 2024-04-21 RX ORDER — POLYETHYLENE GLYCOL 3350 17 G/17G
17 POWDER, FOR SOLUTION ORAL DAILY PRN
Status: DISCONTINUED | OUTPATIENT
Start: 2024-04-21 | End: 2024-04-22 | Stop reason: HOSPADM

## 2024-04-21 RX ORDER — IBUPROFEN 200 MG
16 TABLET ORAL
Status: DISCONTINUED | OUTPATIENT
Start: 2024-04-21 | End: 2024-04-22 | Stop reason: HOSPADM

## 2024-04-21 RX ORDER — ONDANSETRON HYDROCHLORIDE 2 MG/ML
8 INJECTION, SOLUTION INTRAVENOUS EVERY 6 HOURS PRN
Status: DISCONTINUED | OUTPATIENT
Start: 2024-04-21 | End: 2024-04-22 | Stop reason: HOSPADM

## 2024-04-21 RX ORDER — PRASUGREL 5 MG/1
10 TABLET, FILM COATED ORAL DAILY
COMMUNITY

## 2024-04-21 RX ORDER — IBUPROFEN 200 MG
24 TABLET ORAL
Status: DISCONTINUED | OUTPATIENT
Start: 2024-04-21 | End: 2024-04-22 | Stop reason: HOSPADM

## 2024-04-21 RX ORDER — METOPROLOL TARTRATE 100 MG/1
100 TABLET ORAL DAILY
Status: DISCONTINUED | OUTPATIENT
Start: 2024-04-22 | End: 2024-04-22 | Stop reason: HOSPADM

## 2024-04-21 RX ADMIN — NITROGLYCERIN 0.5 INCH: 20 OINTMENT TOPICAL at 12:04

## 2024-04-21 RX ADMIN — ATORVASTATIN CALCIUM 80 MG: 40 TABLET, FILM COATED ORAL at 08:04

## 2024-04-21 RX ADMIN — IOHEXOL 100 ML: 350 INJECTION, SOLUTION INTRAVENOUS at 02:04

## 2024-04-21 RX ADMIN — IBUPROFEN 600 MG: 200 TABLET, FILM COATED ORAL at 08:04

## 2024-04-21 NOTE — ED TRIAGE NOTES
Christie Damian, a 68 y.o. female presents to the ED w/ complaint of chest pain and palpitations, radiating to jaw & midback with SOB. Started 2 days ago, 8/10.     Triage note:  Chief Complaint   Patient presents with    Chest Pain     Midsternal chest tightness, palpitations and mid back pains. Hx of CHF     Review of patient's allergies indicates:   Allergen Reactions    Codeine Nausea And Vomiting    Lisinopril      Past Medical History:   Diagnosis Date    Arthritis     Cataract     Diabetes mellitus     Hyperlipidemia     Hypertension     Pre-diabetes     Sciatica      Patient identifiers verified and correct for Christie Damian.    LOC: The patient is awake, alert and oriented x 4. Pt is speaking appropriately, no slurred speech.  APPEARANCE: Patient resting comfortably and in no acute distress. Pt is clean and well groomed. No JVD visible. Pt reports pain level of 0.  SKIN: Skin is warm dry and intact, and color is consistent with ethnicity. No tenting observed and capillary refill <3 seconds. No clubbing noted to nail beds. No breakdown or brusing visible and mucus membranes moist and acyanotic.  MUSCULOSKELETAL: Full range of motion present in all extremities. Hand  equal and leg strength strong +2 bilaterally.  RESPIRATORY: Airway is open and patent. Respirations-unlabored, regular rate, equal bilaterally on inspiration and expiration. No accessory muscle use noted. Lungs clear to auscultation in all fields bilaterally anterior and posterior.   CARDIAC: Midsternal chest pain extends to jaw.   ABDOMEN: Soft and non-tender to palpation with no distention noted. Normoactive bowel sounds X4 quadrants. Pt has no complaints of abnormal bowel movements. Pt reports normal appetite.   NEUROLOGIC: Eyes open spontaneously and facial expression symmetrical. Pt behavior appropriate to situation, and pt follows commands.  Pt reports sensation present in all extremities when touched with a finger. No s/s of  ischemic stroke. PERSAIRA  : No complaints of frequency, burning, urgency or blood in the urine.

## 2024-04-21 NOTE — ED NOTES
I-STAT Chem-8+ Results:   Value Reference Range   Sodium 140 136-145 mmol/L   Potassium  4.1 3.5-5.1 mmol/L   Chloride 103  mmol/L   Ionized Calcium 1.36 1.06-1.42 mmol/L   CO2 (measured) 27 23-29 mmol/L   Glucose 87  mg/dL   BUN 18 6-30 mg/dL   Creatinine 0.9 0.5-1.4 mg/dL   Hematocrit 38 36-54%

## 2024-04-21 NOTE — ASSESSMENT & PLAN NOTE
Previous echo with G1DD  BNP 20 but could be falsely low 2/2 obesity  No pleural effusions or pulmonary edema on CTA chest  Repeat 2D echo

## 2024-04-21 NOTE — ASSESSMENT & PLAN NOTE
67yo female with multiple risk factors and history of CAD s/p PCI who presents with chest pain  EKG without STEMI or any acute ST/T wave changes  Initial troponin negative, will trend until peak  Heart Score:  6  Continue Aspirin 81mg PO daily  Continue Effient 10mg daily  Continue Lipitor 80mg PO daily  Continue tele  Nitro prn - Was given Nitro ointment in the ED  NPO at midnight for cardiac PET

## 2024-04-21 NOTE — SUBJECTIVE & OBJECTIVE
Past Medical History:   Diagnosis Date    Arthritis     Cataract     Diabetes mellitus     Hyperlipidemia     Hypertension     Pre-diabetes     Sciatica        Past Surgical History:   Procedure Laterality Date    HYSTERECTOMY      LEG SURGERY      TONSILLECTOMY         Review of patient's allergies indicates:   Allergen Reactions    Codeine Nausea And Vomiting    Lisinopril        Current Facility-Administered Medications   Medication Dose Route Frequency Provider Last Rate Last Admin    acetaminophen tablet 650 mg  650 mg Oral Q4H PRJudy Thompson MD        [START ON 4/22/2024] aspirin chewable tablet 81 mg  81 mg Oral Daily Judy Cheatham MD        atorvastatin tablet 80 mg  80 mg Oral QHS Judy Cheatham MD        calcium carbonate 200 mg calcium (500 mg) chewable tablet 1,000 mg  1,000 mg Oral TID PRN Judy Cheatham MD        dextrose 10% bolus 125 mL 125 mL  12.5 g Intravenous PRN Judy Cheatham MD        dextrose 10% bolus 125 mL 125 mL  12.5 g Intravenous PRN Judy Cheatham MD        dextrose 10% bolus 250 mL 250 mL  25 g Intravenous PRN Judy Cheatham MD        dextrose 10% bolus 250 mL 250 mL  25 g Intravenous PRN Judy Cheatham MD        glucagon (human recombinant) injection 1 mg  1 mg Intramuscular PRN Judy Cheatham MD        glucagon (human recombinant) injection 1 mg  1 mg Intramuscular PRN Judy Cheatham MD        glucose chewable tablet 16 g  16 g Oral PRN Judy Cheatham MD        glucose chewable tablet 16 g  16 g Oral PRN Judy Cheatham MD        glucose chewable tablet 24 g  24 g Oral PRJudy Thompson MD        glucose chewable tablet 24 g  24 g Oral PRN Judy Cheatham MD        ibuprofen tablet 600 mg  600 mg Oral Q6H PRN Judy Cheatham MD        insulin aspart U-100 pen 0-5 Units  0-5 Units Subcutaneous QID (AC + HS) Judy Cast MD        [START ON 4/22/2024] losartan tablet 100 mg  100 mg Oral Daily Judy Cheatham MD         melatonin tablet 6 mg  6 mg Oral Nightly PRN Judy Cheatham MD        [START ON 4/22/2024] metoprolol tartrate (LOPRESSOR) tablet 100 mg  100 mg Oral Daily Judy Cheatham MD        naloxone 0.4 mg/mL injection 0.02 mg  0.02 mg Intravenous PRN Judy Cheatham MD        ondansetron injection 8 mg  8 mg Intravenous Q6H PRN Judy Cheatham MD        [START ON 4/22/2024] pantoprazole EC tablet 40 mg  40 mg Oral Daily Judy Cheatham MD        polyethylene glycol packet 17 g  17 g Oral Daily PRN Judy Cheatham MD        [START ON 4/22/2024] prasugreL tablet 10 mg  10 mg Oral Daily Judy Cheatham MD        sodium chloride 0.9% flush 5 mL  5 mL Intravenous PRN Judy Cheatham MD        [START ON 4/22/2024] spironolactone tablet 50 mg  50 mg Oral Daily Judy Cheatham MD         Current Outpatient Medications   Medication Sig Dispense Refill    propranoloL (INDERAL) 80 MG tablet Take 80 mg by mouth 2 (two) times daily.      albuterol (PROVENTIL/VENTOLIN HFA) 90 mcg/actuation inhaler Inhale 2 puffs into the lungs every 6 (six) hours as needed for Shortness of Breath. Rescue      amitriptyline (ELAVIL) 10 MG tablet Take 10 mg by mouth nightly as needed for Insomnia.      amLODIPine (NORVASC) 5 MG tablet Take 5 mg by mouth once daily.      ascorbic acid, vitamin C, (VITAMIN C) 500 MG tablet Take 500 mg by mouth once daily.      aspirin 81 MG Chew Take 81 mg by mouth once daily.      atorvastatin (LIPITOR) 80 MG tablet Take 80 mg by mouth every evening.      cyanocobalamin (VITAMIN B-12) 100 MCG tablet Take 100 mcg by mouth once daily.      cyclobenzaprine (FLEXERIL) 5 MG tablet Take 5 mg by mouth 2 (two) times daily as needed.      gabapentin (NEURONTIN) 300 MG capsule Take 1 capsule (300 mg total) by mouth 3 (three) times daily. 270 capsule 3    losartan (COZAAR) 100 MG tablet Take 100 mg by mouth once daily.      metFORMIN (GLUCOPHAGE-XR) 500 MG ER 24hr tablet Take 1,000 mg by mouth 2 (two) times daily.       metoprolol tartrate (LOPRESSOR) 100 MG tablet Take 100 mg by mouth once daily.      neomycin-polymyxin-hydrocortisone (CORTISPORIN) 3.5-10,000-1 mg/mL-unit/mL-% otic suspension Place 3 drops into both ears 3 (three) times daily.      OZEMPIC 0.25 mg or 0.5 mg(2 mg/1.5 mL) pen injector Inject 0.5 mg into the skin every Wednesday.      pantoprazole (PROTONIX) 40 MG tablet Take 40 mg by mouth daily as needed.      potassium chloride (K-TAB) 20 mEq Take 20 mEq by mouth once daily.      prasugreL (EFFIENT) 5 mg Tab Take 10 mg by mouth once daily.      spironolactone (ALDACTONE) 50 MG tablet Take 50 mg by mouth once daily.      SYMBICORT 80-4.5 mcg/actuation HFAA Inhale 2 puffs into the lungs 2 (two) times a day.      torsemide (DEMADEX) 20 MG Tab Take 20 mg by mouth once daily.      vitamin D (VITAMIN D3) 1000 units Tab Take 1,000 Units by mouth once daily.      vitamin E 100 UNIT capsule Take 100 Units by mouth once daily.       Family History       Problem Relation (Age of Onset)    Diabetes Mother    Glaucoma Mother    No Known Problems Father, Sister, Brother, Maternal Aunt, Maternal Uncle, Paternal Aunt, Paternal Uncle, Maternal Grandmother, Maternal Grandfather, Paternal Grandmother, Paternal Grandfather          Tobacco Use    Smoking status: Never    Smokeless tobacco: Never   Substance and Sexual Activity    Alcohol use: No    Drug use: Never    Sexual activity: Not Currently     Review of Systems   Constitutional:  Negative for chills, fatigue and fever.   HENT:  Negative for sore throat and trouble swallowing.    Eyes:  Negative for photophobia and visual disturbance.   Respiratory:  Positive for shortness of breath. Negative for cough.    Cardiovascular:  Positive for chest pain and palpitations. Negative for leg swelling.   Gastrointestinal:  Negative for abdominal pain, constipation, diarrhea, nausea and vomiting.   Endocrine: Negative for cold intolerance and heat intolerance.   Genitourinary:   Negative for dysuria and frequency.   Musculoskeletal:  Positive for back pain. Negative for arthralgias and myalgias.   Skin:  Negative for rash and wound.   Neurological:  Negative for dizziness, syncope, weakness and light-headedness.   Psychiatric/Behavioral:  Negative for confusion and hallucinations.    All other systems reviewed and are negative.    Objective:     Vital Signs (Most Recent):  Temp: 98.5 °F (36.9 °C) (04/21/24 1426)  Pulse: 80 (04/21/24 1428)  Resp: 16 (04/21/24 1426)  BP: (!) 166/78 (04/21/24 1426)  SpO2: 98 % (04/21/24 1426) Vital Signs (24h Range):  Temp:  [98.1 °F (36.7 °C)-98.5 °F (36.9 °C)] 98.5 °F (36.9 °C)  Pulse:  [74-80] 80  Resp:  [16] 16  SpO2:  [98 %-99 %] 98 %  BP: (145-166)/(76-79) 166/78     Weight: 99.8 kg (220 lb)  Body mass index is 42.97 kg/m².     Physical Exam  Vitals and nursing note reviewed.   Constitutional:       Appearance: Normal appearance. She is obese.   HENT:      Head: Normocephalic and atraumatic.   Eyes:      General: No scleral icterus.  Cardiovascular:      Rate and Rhythm: Normal rate and regular rhythm.      Heart sounds: No murmur heard.  Pulmonary:      Effort: Pulmonary effort is normal. No respiratory distress.      Breath sounds: Normal breath sounds. No wheezing.   Abdominal:      General: Bowel sounds are normal. There is no distension.      Palpations: Abdomen is soft.      Tenderness: There is no abdominal tenderness.   Musculoskeletal:         General: Normal range of motion.      Cervical back: Normal range of motion and neck supple.      Right lower leg: No edema.      Left lower leg: No edema.   Skin:     General: Skin is warm and dry.   Neurological:      General: No focal deficit present.      Mental Status: She is alert and oriented to person, place, and time. Mental status is at baseline.                Significant Labs: All pertinent labs within the past 24 hours have been reviewed.  CBC:   Recent Labs   Lab 04/21/24  1301 04/21/24  1306    WBC 5.53  --    HGB 12.1  --    HCT 39.0 38     --      CMP:   Recent Labs   Lab 04/21/24  1301      K 4.3      CO2 23   GLU 85   BUN 18   CREATININE 1.0   CALCIUM 10.4   PROT 7.8   ALBUMIN 4.4   BILITOT 0.4   ALKPHOS 57   AST 15   ALT 16   ANIONGAP 10     Troponin:   Recent Labs   Lab 04/21/24  1301   TROPONINI <0.006       Significant Imaging: I have reviewed all pertinent imaging results/findings within the past 24 hours.  EKG: I have reviewed all pertinent results/findings within the past 24 hours and my personal findings are: No STEMI, QTc 397

## 2024-04-21 NOTE — HPI
Ms. Christie Damian is a 68 y.o. female with CAD s/p PCI (2023 ThiChandana) on DAPT with Aspirin and Effient, T2DM, HTN, HLD and morbid obesity who presents to the [facility] ED on 4/21 for evaluation of a few days of chest pain.  She endorses midsternal chest pain that radiates to her neck, back, and jaw.  Her symptoms occur both at rest and with exertion.  However at rest, she does feel palpitations.  She did also have some SOB.  She denies any nausea or vomiting, but does have acid reflux.  She also mentions that her BP has been higher than normal.  At baseline, her BP is in the 100s, but lately has been in the 190s.  She denies any tobacco abuse.  She denies any lower extremity swelling.  She does have headaches now after receiving Nitro.    Upon arrival to the ER, vitals were temp 98.1F, HR 79, /76 (max /78).  Labs showed WBC 5, Hgb 12, D Dimer 0.38, Trop <0.006, BNP 20.  EKG showed normal sinus rhythm, no STEMI, .  CXR and CTA chest were negative.  She was given Nitro ointment with improvement in her chest pain, but with worsening headache.  She was admitted to Hospital Medicine for further management.

## 2024-04-21 NOTE — ASSESSMENT & PLAN NOTE
Chronic, uncontrolled. Latest blood pressure and vitals reviewed-     Temp:  [98.1 °F (36.7 °C)-98.5 °F (36.9 °C)]   Pulse:  [74-80]   Resp:  [16]   BP: (145-166)/(76-79)   SpO2:  [98 %-99 %] .     Reports baseline BP 100s, but lately has been having BP as high as 190s  Continue Losartan 100mg daily  Continue Metoprolol 100mg daily  Continue Aldactone 50mg daily

## 2024-04-21 NOTE — ASSESSMENT & PLAN NOTE
Body mass index is 42.97 kg/m². Morbid obesity complicates all aspects of disease management from diagnostic modalities to treatment. Weight loss encouraged and health benefits explained to patient.

## 2024-04-21 NOTE — H&P
[facility] - Emergency Dept  [facility] Medicine  History & Physical    Patient Name: Christie Damian  MRN: [id number]  Patient Class: OP- Observation  Admission Date: 4/21/2024  Attending Physician: Judy Cheatham MD   Primary Care Provider: Mel[facility] Primary Doctor         Patient information was obtained from patient, past medical records, and ER records.     Subjective:     Principal Problem:Chest pain, rule out acute myocardial infarction    Chief Complaint:   Chief Complaint   Patient presents with    Chest Pain     Midsternal chest tightness, palpitations and mid back pains. Hx of CHF        HPI: Ms. Christie Damian is a 68 y.o. female with CAD s/p PCI (2023 [facility]) on DAPT with Aspirin and Effient, T2DM, HTN, HLD and morbid obesity who presents to the [facility] ED on 4/21 for evaluation of a few days of chest pain.  She endorses midsternal chest pain that radiates to her neck, back, and jaw.  Her symptoms occur both at rest and with exertion.  However at rest, she does feel palpitations.  She did also have some SOB.  She denies any nausea or vomiting, but does have acid reflux.  She also mentions that her BP has been higher than normal.  At baseline, her BP is in the 100s, but lately has been in the 190s.  She denies any tobacco abuse.  She denies any lower extremity swelling.  She does have headaches now after receiving Nitro.    Upon arrival to the ER, vitals were temp 98.1F, HR 79, /76 (max /78).  Labs showed WBC 5, Hgb 12, D Dimer 0.38, Trop <0.006, BNP 20.  EKG showed normal sinus rhythm, no STEMI, .  CXR and CTA chest were negative.  She was given Nitro ointment with improvement in her chest pain, but with worsening headache.  She was admitted to Hospital Medicine for further management.    Past Medical History:   Diagnosis Date    Arthritis     Cataract     Diabetes mellitus     Hyperlipidemia     Hypertension     Pre-diabetes     Sciatica        Past Surgical History:   Procedure  Laterality Date    HYSTERECTOMY      LEG SURGERY      TONSILLECTOMY         Review of patient's allergies indicates:   Allergen Reactions    Codeine Nausea And Vomiting    Lisinopril        Current Facility-Administered Medications   Medication Dose Route Frequency Provider Last Rate Last Admin    acetaminophen tablet 650 mg  650 mg Oral Q4H PRJudy Thompson MD        [START ON 4/22/2024] aspirin chewable tablet 81 mg  81 mg Oral Daily Judy Cheatham MD        atorvastatin tablet 80 mg  80 mg Oral QHS Judy Cheatham MD        calcium carbonate 200 mg calcium (500 mg) chewable tablet 1,000 mg  1,000 mg Oral TID PRN Judy Cheatham MD        dextrose 10% bolus 125 mL 125 mL  12.5 g Intravenous PRN Judy Cheatham MD        dextrose 10% bolus 125 mL 125 mL  12.5 g Intravenous PRN Judy Cheatham MD        dextrose 10% bolus 250 mL 250 mL  25 g Intravenous PRN Judy Cheatham MD        dextrose 10% bolus 250 mL 250 mL  25 g Intravenous PRN Judy Cheatham MD        glucagon (human recombinant) injection 1 mg  1 mg Intramuscular PRN Judy Cheatham MD        glucagon (human recombinant) injection 1 mg  1 mg Intramuscular PRN Judy Cheatham MD        glucose chewable tablet 16 g  16 g Oral PRN Judy Cheatham MD        glucose chewable tablet 16 g  16 g Oral PRN Judy Cheatham MD        glucose chewable tablet 24 g  24 g Oral PRN Judy Cheatham MD        glucose chewable tablet 24 g  24 g Oral PRN Judy Cheatham MD        ibuprofen tablet 600 mg  600 mg Oral Q6H PRN Judy Cheatham MD        insulin aspart U-100 pen 0-5 Units  0-5 Units Subcutaneous QID (AC + HS) PRJudy Thompson MD        [START ON 4/22/2024] losartan tablet 100 mg  100 mg Oral Daily Judy Cheatham MD        melatonin tablet 6 mg  6 mg Oral Nightly PRN Judy Cheatham MD        [START ON 4/22/2024] metoprolol tartrate (LOPRESSOR) tablet 100 mg  100 mg Oral Daily Judy Cheatham MD        naloxone 0.4  mg/mL injection 0.02 mg  0.02 mg Intravenous PRN Judy Cheatham MD        ondansetron injection 8 mg  8 mg Intravenous Q6H PRN Judy Cheatham MD        [START ON 4/22/2024] pantoprazole EC tablet 40 mg  40 mg Oral Daily Judy Cheatham MD        polyethylene glycol packet 17 g  17 g Oral Daily PRN Judy Cheatham MD        [START ON 4/22/2024] prasugreL tablet 10 mg  10 mg Oral Daily Judy Cheatham MD        sodium chloride 0.9% flush 5 mL  5 mL Intravenous PRN Judy Cheatham MD        [START ON 4/22/2024] spironolactone tablet 50 mg  50 mg Oral Daily Judy Cheatham MD         Current Outpatient Medications   Medication Sig Dispense Refill    propranoloL (INDERAL) 80 MG tablet Take 80 mg by mouth 2 (two) times daily.      albuterol (PROVENTIL/VENTOLIN HFA) 90 mcg/actuation inhaler Inhale 2 puffs into the lungs every 6 (six) hours as needed for Shortness of Breath. Rescue      amitriptyline (ELAVIL) 10 MG tablet Take 10 mg by mouth nightly as needed for Insomnia.      amLODIPine (NORVASC) 5 MG tablet Take 5 mg by mouth once daily.      ascorbic acid, vitamin C, (VITAMIN C) 500 MG tablet Take 500 mg by mouth once daily.      aspirin 81 MG Chew Take 81 mg by mouth once daily.      atorvastatin (LIPITOR) 80 MG tablet Take 80 mg by mouth every evening.      cyanocobalamin (VITAMIN B-12) 100 MCG tablet Take 100 mcg by mouth once daily.      cyclobenzaprine (FLEXERIL) 5 MG tablet Take 5 mg by mouth 2 (two) times daily as needed.      gabapentin (NEURONTIN) 300 MG capsule Take 1 capsule (300 mg total) by mouth 3 (three) times daily. 270 capsule 3    losartan (COZAAR) 100 MG tablet Take 100 mg by mouth once daily.      metFORMIN (GLUCOPHAGE-XR) 500 MG ER 24hr tablet Take 1,000 mg by mouth 2 (two) times daily.      metoprolol tartrate (LOPRESSOR) 100 MG tablet Take 100 mg by mouth once daily.      neomycin-polymyxin-hydrocortisone (CORTISPORIN) 3.5-10,000-1 mg/mL-unit/mL-% otic suspension Place 3 drops into  both ears 3 (three) times daily.      OZEMPIC 0.25 mg or 0.5 mg(2 mg/1.5 mL) pen injector Inject 0.5 mg into the skin every Wednesday.      pantoprazole (PROTONIX) 40 MG tablet Take 40 mg by mouth daily as needed.      potassium chloride (K-TAB) 20 mEq Take 20 mEq by mouth once daily.      prasugreL (EFFIENT) 5 mg Tab Take 10 mg by mouth once daily.      spironolactone (ALDACTONE) 50 MG tablet Take 50 mg by mouth once daily.      SYMBICORT 80-4.5 mcg/actuation HFAA Inhale 2 puffs into the lungs 2 (two) times a day.      torsemide (DEMADEX) 20 MG Tab Take 20 mg by mouth once daily.      vitamin D (VITAMIN D3) 1000 units Tab Take 1,000 Units by mouth once daily.      vitamin E 100 UNIT capsule Take 100 Units by mouth once daily.       Family History       Problem Relation (Age of Onset)    Diabetes Mother    Glaucoma Mother    No Known Problems Father, Sister, Brother, Maternal Aunt, Maternal Uncle, Paternal Aunt, Paternal Uncle, Maternal Grandmother, Maternal Grandfather, Paternal Grandmother, Paternal Grandfather          Tobacco Use    Smoking status: Never    Smokeless tobacco: Never   Substance and Sexual Activity    Alcohol use: No    Drug use: Never    Sexual activity: Not Currently     Review of Systems   Constitutional:  Negative for chills, fatigue and fever.   HENT:  Negative for sore throat and trouble swallowing.    Eyes:  Negative for photophobia and visual disturbance.   Respiratory:  Positive for shortness of breath. Negative for cough.    Cardiovascular:  Positive for chest pain and palpitations. Negative for leg swelling.   Gastrointestinal:  Negative for abdominal pain, constipation, diarrhea, nausea and vomiting.   Endocrine: Negative for cold intolerance and heat intolerance.   Genitourinary:  Negative for dysuria and frequency.   Musculoskeletal:  Positive for back pain. Negative for arthralgias and myalgias.   Skin:  Negative for rash and wound.   Neurological:  Negative for dizziness, syncope,  weakness and light-headedness.   Psychiatric/Behavioral:  Negative for confusion and hallucinations.    All other systems reviewed and are negative.    Objective:     Vital Signs (Most Recent):  Temp: 98.5 °F (36.9 °C) (04/21/24 1426)  Pulse: 80 (04/21/24 1428)  Resp: 16 (04/21/24 1426)  BP: (!) 166/78 (04/21/24 1426)  SpO2: 98 % (04/21/24 1426) Vital Signs (24h Range):  Temp:  [98.1 °F (36.7 °C)-98.5 °F (36.9 °C)] 98.5 °F (36.9 °C)  Pulse:  [74-80] 80  Resp:  [16] 16  SpO2:  [98 %-99 %] 98 %  BP: (145-166)/(76-79) 166/78     Weight: 99.8 kg (220 lb)  Body mass index is 42.97 kg/m².     Physical Exam  Vitals and nursing note reviewed.   Constitutional:       Appearance: Normal appearance. She is obese.   HENT:      Head: Normocephalic and atraumatic.   Eyes:      General: No scleral icterus.  Cardiovascular:      Rate and Rhythm: Normal rate and regular rhythm.      Heart sounds: No murmur heard.  Pulmonary:      Effort: Pulmonary effort is normal. No respiratory distress.      Breath sounds: Normal breath sounds. No wheezing.   Abdominal:      General: Bowel sounds are normal. There is no distension.      Palpations: Abdomen is soft.      Tenderness: There is no abdominal tenderness.   Musculoskeletal:         General: Normal range of motion.      Cervical back: Normal range of motion and neck supple.      Right lower leg: No edema.      Left lower leg: No edema.   Skin:     General: Skin is warm and dry.   Neurological:      General: No focal deficit present.      Mental Status: She is alert and oriented to person, place, and time. Mental status is at baseline.                Significant Labs: All pertinent labs within the past 24 hours have been reviewed.  CBC:   Recent Labs   Lab 04/21/24  1301 04/21/24  1306   WBC 5.53  --    HGB 12.1  --    HCT 39.0 38     --      CMP:   Recent Labs   Lab 04/21/24  1301      K 4.3      CO2 23   GLU 85   BUN 18   CREATININE 1.0   CALCIUM 10.4   PROT 7.8  "  ALBUMIN 4.4   BILITOT 0.4   ALKPHOS 57   AST 15   ALT 16   ANIONGAP 10     Troponin:   Recent Labs   Lab 04/21/24  1301   TROPONINI <0.006       Significant Imaging: I have reviewed all pertinent imaging results/findings within the past 24 hours.  EKG: I have reviewed all pertinent results/findings within the past 24 hours and my personal findings are: No STEMI, QTc 397  Assessment/Plan:     * Chest pain, rule out acute myocardial infarction  67yo female with multiple risk factors and history of CAD s/p PCI who presents with chest pain  EKG without STEMI or any acute ST/T wave changes  Initial troponin negative, will trend until peak  Heart Score:  6  Continue Aspirin 81mg PO daily  Continue Effient 10mg daily  Continue Lipitor 80mg PO daily  Continue tele  Nitro prn - Was given Nitro ointment in the ED  NPO at midnight for cardiac PET        CAD (coronary artery disease), native coronary artery  Patient with known CAD s/p stent placement in 2023 in [facility]  Continue  Effient, ASA, and Statin  See chest pain above    Benign hypertensive heart disease with HF (heart failure)  Noted      Obesity, diabetes, and hypertension syndrome  Noted    Severe obesity (BMI >= 40)  Body mass index is 42.97 kg/m². Morbid obesity complicates all aspects of disease management from diagnostic modalities to treatment. Weight loss encouraged and health benefits explained to patient.         Chronic diastolic heart failure  Previous echo with G1DD  BNP 20 but could be falsely low 2/2 obesity  No pleural effusions or pulmonary edema on CTA chest  Repeat 2D echo      DM II (diabetes mellitus, type II), controlled  Patient's FSGs are controlled on current medication regimen.  Last A1c reviewed-   Lab Results   Component Value Date    HGBA1C 6.0 (H) 04/21/2024     Most recent fingerstick glucose reviewed- No results for input(s): "POCTGLUCOSE" in the last 24 hours.  Current correctional scale  Low  Maintain anti-hyperglycemic dose as " follows-   Antihyperglycemics (From admission, onward)      Start     Stop Route Frequency Ordered    04/21/24 1626  insulin aspart U-100 pen 0-5 Units         -- SubQ Before meals & nightly PRN 04/21/24 1526          Home DM regimen:  Ozepmic, Metformin  Hold oral hyperglycemic meds  SSI with POCT accuchecks to achieve blood glucose of 140-180 while hospitalized  Hypoglycemia protocol  Diabetic diet when eating - NPO at midnight for cardiac PET    Hypertension  Chronic, uncontrolled. Latest blood pressure and vitals reviewed-     Temp:  [98.1 °F (36.7 °C)-98.5 °F (36.9 °C)]   Pulse:  [74-80]   Resp:  [16]   BP: (145-166)/(76-79)   SpO2:  [98 %-99 %] .     Reports baseline BP 100s, but lately has been having BP as high as 190s  Continue Losartan 100mg daily  Continue Metoprolol 100mg daily  Continue Aldactone 50mg daily    Hyperlipidemia  Chronic and stable  Continue Statin        VTE Risk Mitigation (From admission, onward)           Ordered     IP VTE LOW RISK PATIENT  Once         04/21/24 1527                       On 04/21/2024, patient should be placed in hospital observation services under my care.             Judy Cheatham MD  Department of Hospital Medicine  [facility] - Emergency Dept

## 2024-04-21 NOTE — ASSESSMENT & PLAN NOTE
Patient with known CAD s/p stent placement in 2023 in [facility]  Continue  Effient, ASA, and Statin  See chest pain above

## 2024-04-21 NOTE — ED PROVIDER NOTES
Encounter Date: 4/21/2024       History     Chief Complaint   Patient presents with    Chest Pain     Midsternal chest tightness, palpitations and mid back pains. Hx of CHF     The patient is a 68 year old female. She has a documented past medical history significant for CAD s/p PCI on DAPT with Aspirin and Effient, DM II, HTN, HLD, and morbid obesity. She presents to the ER for an emergent evaluation c/o acute intermittent left sided chest pain that radiates to her left neck/jaw, and upper left back x 2 days. Symptoms occur both at rest and during light activity. She also reports having associated mild SOB and palpitations. She denies any nausea or vomiting, but does have acid reflux. She states that she initially assumed that the symptoms were due to indigestion and increased gas/belching. She is also concerned that her BP has been higher than normal. She reports that this morning her BP was 190 systolic. No additional symptoms or concerns reported. She states that yesterday early afternoon was the last episode, lasting less than 10 minutes, then subsiding, but reports she still feels very mild residual left sided chest discomfort, which made her concerned and prompted her to come to the ER. She denies any mitigating or exacerbating factors. Eating does not affect her symptoms. She denies any recent illness. She did take her morning medications today, but denies any pre-arrival treatment otherwise. She is not prescribed or taking NTG.         Review of patient's allergies indicates:   Allergen Reactions    Codeine Nausea And Vomiting    Lisinopril      Past Medical History:   Diagnosis Date    Arthritis     Cataract     Diabetes mellitus     Hyperlipidemia     Hypertension     Pre-diabetes     Sciatica      Past Surgical History:   Procedure Laterality Date    HYSTERECTOMY      LEG SURGERY      TONSILLECTOMY       Family History   Problem Relation Name Age of Onset    Diabetes Mother      Glaucoma Mother      No  Known Problems Father      No Known Problems Sister      No Known Problems Brother      No Known Problems Maternal Aunt      No Known Problems Maternal Uncle      No Known Problems Paternal Aunt      No Known Problems Paternal Uncle      No Known Problems Maternal Grandmother      No Known Problems Maternal Grandfather      No Known Problems Paternal Grandmother      No Known Problems Paternal Grandfather      Amblyopia Neg Hx      Blindness Neg Hx      Cancer Neg Hx      Cataracts Neg Hx      Hypertension Neg Hx      Macular degeneration Neg Hx      Retinal detachment Neg Hx      Strabismus Neg Hx      Stroke Neg Hx      Thyroid disease Neg Hx       Social History     Tobacco Use    Smoking status: Never    Smokeless tobacco: Never   Substance Use Topics    Alcohol use: No    Drug use: Never     Review of Systems   Constitutional:  Negative for activity change, appetite change, chills, diaphoresis, fatigue and fever.   HENT:  Negative for congestion and sore throat.    Eyes:  Negative for pain and visual disturbance.   Respiratory:  Positive for shortness of breath. Negative for cough and wheezing.    Cardiovascular:  Positive for chest pain and palpitations. Negative for leg swelling.   Gastrointestinal:  Negative for abdominal distention, abdominal pain, blood in stool, constipation, diarrhea, nausea and vomiting.   Genitourinary:  Negative for decreased urine volume, difficulty urinating, dysuria, flank pain and frequency.   Musculoskeletal:  Negative for back pain, gait problem and joint swelling.        (+) Reports pain radiated to left upper back, left neck/jaw.    Skin:  Negative for color change and rash.   Allergic/Immunologic: Negative for immunocompromised state.   Neurological:  Negative for dizziness, syncope, facial asymmetry, speech difficulty, weakness, light-headedness, numbness and headaches.   Psychiatric/Behavioral:  Negative for confusion.        Physical Exam     Initial Vitals [04/21/24 1139]    BP Pulse Resp Temp SpO2   (!) 145/76 79 16 98.1 °F (36.7 °C) 99 %      MAP       --         Physical Exam    Nursing note and vitals reviewed.  Constitutional: She appears well-developed and well-nourished. She is not diaphoretic.   Alert and ambulatory. Comfortable/well appearing    HENT:   Mouth/Throat: Oropharynx is clear and moist.   Eyes: Conjunctivae are normal.   Neck: Neck supple. No JVD present.   Non-tender to palpation.    Normal range of motion.  Cardiovascular:  Normal rate, normal heart sounds and intact distal pulses.           Equal radial pulses bilaterally.    Pulmonary/Chest: No respiratory distress.   CTA bilaterally. Not coughing or wheezing. No conversational dyspnea or increased work of breathing.    Abdominal: Abdomen is soft. She exhibits no distension. There is no abdominal tenderness.   Obese abdomen. Soft, NT/ND.  There is no rebound and no guarding.   Musculoskeletal:         General: No tenderness or edema. Normal range of motion.      Cervical back: Normal range of motion and neck supple.     Neurological: She is alert and oriented to person, place, and time. She has normal strength. No sensory deficit.   Skin: Skin is warm and dry. No rash noted.   Psychiatric: She has a normal mood and affect. Her behavior is normal.         ED Course   Procedures  Labs Reviewed   CBC W/ AUTO DIFFERENTIAL - Abnormal; Notable for the following components:       Result Value    MCH 26.8 (*)     MCHC 31.0 (*)     Gran % 32.6 (*)     Lymph % 58.6 (*)     All other components within normal limits   HEMOGLOBIN A1C - Abnormal; Notable for the following components:    Hemoglobin A1C 6.0 (*)     All other components within normal limits    Narrative:     Add-on [facility] to [phone] per Judy Cheatham MD on  04/21/2024  15:39    ISTAT PROCEDURE - Abnormal; Notable for the following components:    POC PH 7.342 (*)     POC PCO2 50.0 (*)     POC PO2 25 (*)     All other components within normal limits   HIV 1 / 2  ANTIBODY    Narrative:     Release to patient->Immediate   HEPATITIS C ANTIBODY    Narrative:     Release to patient->Immediate   COMPREHENSIVE METABOLIC PANEL   TROPONIN I   TROPONIN I   B-TYPE NATRIURETIC PEPTIDE   LIPASE   D DIMER, QUANTITATIVE   TROPONIN ISTAT   HEMOGLOBIN A1C   ISTAT LACTATE   ISTAT PROCEDURE     Results for orders placed or performed during the hospital encounter of 04/21/24   HIV 1/2 Ag/Ab (4th Gen)   Result Value Ref Range    HIV 1/2 Ag/Ab Non-reactive Non-reactive   Hepatitis C Antibody   Result Value Ref Range    Hepatitis C Ab Non-reactive Non-reactive   CBC auto differential   Result Value Ref Range    WBC 5.53 3.90 - 12.70 K/uL    RBC 4.52 4.00 - 5.40 M/uL    Hemoglobin 12.1 12.0 - 16.0 g/dL    Hematocrit 39.0 37.0 - 48.5 %    MCV 86 82 - 98 fL    MCH 26.8 (L) 27.0 - 31.0 pg    MCHC 31.0 (L) 32.0 - 36.0 g/dL    RDW 14.0 11.5 - 14.5 %    Platelets 262 150 - 450 K/uL    MPV 10.5 9.2 - 12.9 fL    Immature Granulocytes 0.2 0.0 - 0.5 %    Gran # (ANC) 1.8 1.8 - 7.7 K/uL    Immature Grans (Abs) 0.01 0.00 - 0.04 K/uL    Lymph # 3.2 1.0 - 4.8 K/uL    Mono # 0.4 0.3 - 1.0 K/uL    Eos # 0.1 0.0 - 0.5 K/uL    Baso # 0.02 0.00 - 0.20 K/uL    nRBC 0 0 /100 WBC    Gran % 32.6 (L) 38.0 - 73.0 %    Lymph % 58.6 (H) 18.0 - 48.0 %    Mono % 6.9 4.0 - 15.0 %    Eosinophil % 1.3 0.0 - 8.0 %    Basophil % 0.4 0.0 - 1.9 %    Differential Method Automated    Comprehensive metabolic panel   Result Value Ref Range    Sodium 137 136 - 145 mmol/L    Potassium 4.3 3.5 - 5.1 mmol/L    Chloride 104 95 - 110 mmol/L    CO2 23 23 - 29 mmol/L    Glucose 85 70 - 110 mg/dL    BUN 18 8 - 23 mg/dL    Creatinine 1.0 0.5 - 1.4 mg/dL    Calcium 10.4 8.7 - 10.5 mg/dL    Total Protein 7.8 6.0 - 8.4 g/dL    Albumin 4.4 3.5 - 5.2 g/dL    Total Bilirubin 0.4 0.1 - 1.0 mg/dL    Alkaline Phosphatase 57 55 - 135 U/L    AST 15 10 - 40 U/L    ALT 16 10 - 44 U/L    eGFR >60.0 >60 mL/min/1.73 m^2    Anion Gap 10 8 - 16 mmol/L   Troponin I  #1   Result Value Ref Range    Troponin I <0.006 0.000 - 0.026 ng/mL   Troponin I #2   Result Value Ref Range    Troponin I <0.006 0.000 - 0.026 ng/mL   B-Type natriuretic peptide (BNP)   Result Value Ref Range    BNP 20 0 - 99 pg/mL   Lipase   Result Value Ref Range    Lipase 58 4 - 60 U/L   D dimer, quantitative   Result Value Ref Range    D-Dimer 0.38 <0.50 mg/L FEU   Troponin ISTAT   Result Value Ref Range    POC Cardiac Troponin I 0.00 0.00 - 0.08 ng/mL    Sample VENOUS    Hemoglobin A1C   Result Value Ref Range    Hemoglobin A1C 6.0 (H) 4.0 - 5.6 %    Estimated Avg Glucose 126 68 - 131 mg/dL   Troponin I   Result Value Ref Range    Troponin I <0.006 0.000 - 0.026 ng/mL   Comprehensive Metabolic Panel (CMP)   Result Value Ref Range    Sodium 137 136 - 145 mmol/L    Potassium 3.9 3.5 - 5.1 mmol/L    Chloride 103 95 - 110 mmol/L    CO2 23 23 - 29 mmol/L    Glucose 95 70 - 110 mg/dL    BUN 20 8 - 23 mg/dL    Creatinine 0.9 0.5 - 1.4 mg/dL    Calcium 10.2 8.7 - 10.5 mg/dL    Total Protein 7.0 6.0 - 8.4 g/dL    Albumin 4.0 3.5 - 5.2 g/dL    Total Bilirubin 0.3 0.1 - 1.0 mg/dL    Alkaline Phosphatase 51 (L) 55 - 135 U/L    AST 13 10 - 40 U/L    ALT 15 10 - 44 U/L    eGFR >60.0 >60 mL/min/1.73 m^2    Anion Gap 11 8 - 16 mmol/L   Magnesium   Result Value Ref Range    Magnesium 1.7 1.6 - 2.6 mg/dL   Phosphorus   Result Value Ref Range    Phosphorus 3.6 2.7 - 4.5 mg/dL   CBC with Automated Differential   Result Value Ref Range    WBC 6.20 3.90 - 12.70 K/uL    RBC 4.23 4.00 - 5.40 M/uL    Hemoglobin 11.6 (L) 12.0 - 16.0 g/dL    Hematocrit 36.8 (L) 37.0 - 48.5 %    MCV 87 82 - 98 fL    MCH 27.4 27.0 - 31.0 pg    MCHC 31.5 (L) 32.0 - 36.0 g/dL    RDW 14.1 11.5 - 14.5 %    Platelets 234 150 - 450 K/uL    MPV 10.3 9.2 - 12.9 fL    Immature Granulocytes 0.2 0.0 - 0.5 %    Gran # (ANC) 2.4 1.8 - 7.7 K/uL    Immature Grans (Abs) 0.01 0.00 - 0.04 K/uL    Lymph # 3.2 1.0 - 4.8 K/uL    Mono # 0.5 0.3 - 1.0 K/uL    Eos # 0.1 0.0 -  0.5 K/uL    Baso # 0.03 0.00 - 0.20 K/uL    nRBC 0 0 /100 WBC    Gran % 38.1 38.0 - 73.0 %    Lymph % 52.1 (H) 18.0 - 48.0 %    Mono % 7.6 4.0 - 15.0 %    Eosinophil % 1.5 0.0 - 8.0 %    Basophil % 0.5 0.0 - 1.9 %    Differential Method Automated    Cardiac PET Scan Stress   Result Value Ref Range    85% Max Predicted      Max Predicted      OHS CV CPX PATIENT IS MALE 0.0     OHS CV CPX PATIENT IS FEMALE 1.0     HR at rest 76 bpm    Systolic blood pressure 191 mmHg    Diastolic blood pressure 93 mmHg    RPP 14,516     Peak HR 92 bpm    Peak Systolic  mmHg    Peak Diatolic BP 75 mmHg    Peak RPP 13,524     % Max HR Achieved 63     1 Minute Recovery  bpm    EF + QEF 47 %    Ejection Fraction- High Stress 59 %    End diastolic index (mL/m2) 91 mL/m2    End diastolic index (mL/m2) 155 mL/m2    End systolic index (mL/m2) 40 mL/m2    End systolic index (mL/m2) 78 mL/m2    Lateral Flow at Rest 0.93 cc/min/g    Inferior Flow at Rest 0.76 cc/min/g    Septal Flow at Rest 0.77 cc/min/g    Anterior Flow at Rest 0.76 cc/min/g    Minimum Flow at Rest 0.47 cc/min/g    Maximum Flow at Rest 1.16 cc/min/g    Whole Heart Flow at Rest 0.81 cc/min/g    Lateral Flow at Stress 1.86 cc/min/g    Inferior Flow at Stress 1.63 cc/min/g    Septal Flow at Stress 1.55 cc/min/g    Anterior Flow at Stress 1.50 cc/min/g    Minimum Flow at Stress 1.03 cc/min/g    Maximum Flow at Stress 2.02 cc/min/g    Whole Heart Flow at Rest 1.64 cc/min/g    Lateral Flow - CFR 1.80     Inferior Flow - CFR 2.14     Septal Flow - CFR 2.04     Anterior Flow - CFR 2.02     Minimum Flow - CFR 1.55     Maximum Flow - CFR 2.45     Whole Heart Flow - CFR 2.00    EKG 12-lead   Result Value Ref Range    QRS Duration 76 ms    OHS QTC Calculation 397 ms   Echo   Result Value Ref Range    RA Width 2.91 cm    LA volume (mod) 34.53 cm3    Left Atrium Major Axis 4.99 cm    Left Atrium Minor Axis 5.22 cm    RA Major Axis 3.91 cm    LV Diastolic Volume 73.34  mL    LV Systolic Volume 26.70 mL    MV Peak A Rosalino 0.92 m/s    MV stenosis pressure 1/2 time 44.94 ms    MV Peak E Rosalino 0.82 m/s    Ao VTI 27.43 cm    Ao peak rosalino 1.23 m/s    LVOT peak VTI 22.98 cm    LVOT peak rosalino 0.97 m/s    LVOT diameter 2.00 cm    IVRT 105.61 msec    E wave deceleration time 154.97 msec    AV mean gradient 4 mmHg    TAPSE 2.22 cm    LA size 2.93 cm    Ascending aorta 2.22 cm    STJ 2.47 cm    Sinus 2.83 cm    LVIDs 2.69 2.1 - 4.0 cm    Posterior Wall 0.90 0.6 - 1.1 cm    IVS 0.79 0.6 - 1.1 cm    LVIDd 4.08 3.5 - 6.0 cm    TDI LATERAL 0.06 m/s    LA WIDTH 3.33 cm    TDI SEPTAL 0.07 m/s    LV LATERAL E/E' RATIO 13.67 m/s    LV SEPTAL E/E' RATIO 11.71 m/s    FS 34 28 - 44 %    LA volume 42.32 cm3    LV mass 103.92 g    ZLVIDD -3.01     ZLVIDS -1.83     Left Ventricle Relative Wall Thickness 0.44 cm    AV valve area 2.63 cm²    AV Velocity Ratio 0.79     AV index (prosthetic) 0.84     MV valve area p 1/2 method 4.90 cm2    E/A ratio 0.89     Mean e' 0.07 m/s    LVOT area 3.1 cm2    LVOT stroke volume 72.16 cm3    AV peak gradient 6 mmHg    E/E' ratio 12.62 m/s    LV Systolic Volume Index 13.8 mL/m2    LV Diastolic Volume Index 37.80 mL/m2    LA Volume Index 21.8 mL/m2    LV Mass Index 54 g/m2    LA Volume Index (Mod) 17.8 mL/m2    DOC by Velocity Ratio 2.48 cm²    BSA 2.06 m2    Est. RA pres 3 mmHg   ISTAT Lactate   Result Value Ref Range    POC Lactate 1.55 0.5 - 2.2 mmol/L    Sample VENOUS     Site Other     Allens Test N/A    ISTAT PROCEDURE   Result Value Ref Range    POC PH 7.342 (L) 7.35 - 7.45    POC PCO2 50.0 (H) 35 - 45 mmHg    POC PO2 25 (LL) 40 - 60 mmHg    POC HCO3 27.1 24 - 28 mmol/L    POC BE 1 -2 to 2 mmol/L    POC SATURATED O2 41 95 - 100 %    POC TCO2 29 24 - 29 mmol/L    Sample VENOUS     Site Other     Allens Test N/A    ISTAT PROCEDURE   Result Value Ref Range    POC Glucose 87 70 - 110 mg/dL    POC BUN 18 6 - 30 mg/dL    POC Creatinine 0.9 0.5 - 1.4 mg/dL    POC Sodium 140 136 -  145 mmol/L    POC Potassium 4.1 3.5 - 5.1 mmol/L    POC Chloride 103 95 - 110 mmol/L    POC TCO2 (MEASURED) 27 23 - 29 mmol/L    POC Ionized Calcium 1.36 1.06 - 1.42 mmol/L    POC Hematocrit 38 36 - 54 %PCV    Sample LULA    POCT glucose   Result Value Ref Range    POCT Glucose 86 70 - 110 mg/dL          ECG Results              EKG 12-lead (Final result)        Collection Time Result Time QRS Duration OHS QTC Calculation    04/21/24 11:43:58 04/22/24 08:55:00 76 397                     Final result by Interface, Lab In [facility] (04/22/24 08:55:03)                   Narrative:    Test Reason : R07.9,    Vent. Rate : 074 BPM     Atrial Rate : 074 BPM     P-R Int : 190 ms          QRS Dur : 076 ms      QT Int : 358 ms       P-R-T Axes : 053 -02 018 degrees     QTc Int : 397 ms    Normal sinus rhythm  Normal ECG  When compared with ECG of 23-JUL-2023 16:23,  No significant change was found  Confirmed by CHRIS ANDREW MD ([id number]) on 4/22/2024 8:54:55 AM    Referred By: [email or web address]   SELF           Confirmed By:CHRIS ANDREW MD                                  Imaging Results              CTA Chest Abdomen Pelvis (Final result)  Result time 04/21/24 14:57:39      Final result by Lindsey Son MD (04/21/24 14:57:39)                   Impression:      No evidence of thoracic or abdominal aorta dissection.    Coronary artery calcifications.    Hysterectomy.    Spondylosis of the lumbar spine with significant facet joint osseous hypertrophy L4-5 and L5-S1.      Electronically signed by: Lindsey Son MD  Date:    04/21/2024  Time:    14:57               Narrative:    EXAMINATION:  CTA CHEST ABDOMEN PELVIS    CLINICAL HISTORY:  Aortic dissection suspected;    TECHNIQUE:  1.25 mm axial images of the chest, thoracic/abdominal aorta and pelvis were obtained after the administration of 100  cc of Omni 350 IV contrast, coronal and sagittal images reformatted.    COMPARISON:  None    FINDINGS:  The thoracic aorta is  of normal caliber and demonstrates mild atherosclerotic plaque.  The abdominal aorta tapers normally, there is mild atherosclerotic plaque.  There is no evidence of aortic dissection.  The cephalic vessels are patent.  The SMA, celiac trunk, bilateral renal artery and HOLGER are patent.  The bilateral iliac and femoral vessels appear normal.    CT chest: The airways are patent.  The thyroid gland appears normal.  No mediastinal or hilar lymphadenopathy.  Trace of pericardial fluid.  Coronary artery calcifications.  The cardiac silhouette is normal in size.    The lungs are normally aerated.    No pleural effusion.  No pneumothorax.    The axillary regions appear normal.    CT abdomen and pelvis: The liver appears normal in size.  No liver mass.  The biliary ducts are not dilated.  The gallbladder is present, no radiopaque stones seen within.  The esophagus and stomach appear normal.  The pancreas, spleen and bilateral adrenal glands appear normal.  The bilateral kidneys and ureters appear normal.    No para-aortic lymphadenopathy.    Mild stool retention, few scattered colonic diverticula.  No inflammatory changes of the bowel seen, no bowel dilation.  No mesenteric inflammatory change.  Upper normal size node in the anterior abdomen, inferior to the greater curvature of the stomach, measuring 1.0 cm (4:515).    No free air, no free fluid.    The bladder is nondistended.  The uterus is removed.  A trace of air noted in the vagina.  The ovaries appear normal.    Small fat containing umbilical hernia.  The inguinal regions appear normal.    The osseous structures demonstrate advanced facet joint degenerative changes at the lower lumbar spine, no osseous lesion seen.                                       X-Ray Chest PA And Lateral (Final result)  Result time 04/21/24 13:20:51      Final result by Job Johnson MD (04/21/24 13:20:51)                   Impression:      No detrimental change or radiographic acute  intrathoracic process seen.      Electronically signed by: Job Johnson MD  Date:    04/21/2024  Time:    13:20               Narrative:    EXAMINATION:  XR CHEST PA AND LATERAL    CLINICAL HISTORY:  Chest Pain;    TECHNIQUE:  PA and lateral views of the chest were performed.    COMPARISON:  Chest radiograph and CT thorax 07/23/2023    FINDINGS:  Cardiomediastinal silhouette is midline and stable without evidence of failure.  The lungs are well expanded noting minimal scattered platelike scarring versus atelectasis similar to prior.  No consolidation, pleural effusion or pneumothorax.  Pulmonary vasculature and hilar contours are within normal limits.  Osseous structures are stable without acute process seen.                                       Medications   nitroGLYCERIN 2% TD oint ointment 0.5 inch (0.5 inches Topical (Top) Given 4/21/24 1245)   iohexoL (OMNIPAQUE 350) injection 100 mL (100 mLs Intravenous Given 4/21/24 1407)   regadenoson injection 0.4 mg (0.4 mg Intravenous Given 4/22/24 0830)   aminophylline injection 75 mg (75 mg Intravenous Given 4/22/24 0832)   rubidium Rb-82 chloride Soln 31.9 millicurie (31.9 millicuries Intravenous Given 4/22/24 0829)   rubidium Rb 82 (CARDIOGEN-82) (31.9 millicuries Intravenous Given 4/22/24 0821)     Medical Decision Making  Amount and/or Complexity of Data Reviewed  Independent Historian: parent  External Data Reviewed: radiology.     Details: Previous ECHO reviewed, previous EKG reviewed   Labs: ordered.  Radiology: ordered and independent interpretation performed.     Details: CTA chest/abd/pelvis negative for aortic aneurysm/dissection   ECG/medicine tests: independent interpretation performed. Decision-making details documented in ED Course.    Risk  Prescription drug management.  Decision regarding hospitalization.      Additional MDM:     EKG: I have independently interpreted EKG(s) - see notes., EKG - Independent Interpretation - Normal sinus rhythm, normal  rate, no acute changes. ER attending physician reviewed and signed. No ischemic changes reported.      X-Rays: I have independently interpreted X-Ray(s) - see notes., Chest X-Ray - Independent Interpretation - Heart size normal, Lungs clear, No acute abnormality. Clear lungs. No acute findings.                       Medical Decision Making:   History:   Old Medical Records: I decided to obtain old medical records.  Old Records Summarized: other records.       <> Summary of Records: Chart review completed, previous records, last ECHO reviewed   Initial Assessment:   67 yo F, hx of CAD, CHF, HTN, DM, here c/o acute episodes of left sided CP that radiates to left upper back, left neck/jaw, with associated SOB and palpitations x 2 days. Last episode was yesterday afternoon, but reports very mild residual left sided chest discomfort presently.   Differential Diagnosis:   ACS, angina, aortic aneurysm/dissection, dysrhythmia, GI/digestive/GERD, CHF exacerbation, pericarditis, myocarditis, musculoskeletal, pneumonia, pneumothorax, etc   Clinical Tests:   Lab Tests: Ordered and Reviewed  Radiological Study: Ordered and Reviewed  Medical Tests: Ordered and Reviewed  ED Management:  Vital signs reviewed - elevated BP  EKG completed on arrival, negative for STEMI or ischemia per attending physician interpretation   I discussed the case in detail with the ER attending physician, will give NTG paste due to reported residual CP at rest and concern for possible unstable angina. Will initiate cardiac work up and get a CTA chest/abdomen/pelvis for aortic evaluation based on concerning description of chest pain radiating to back and neck.     Update: Pt reports relief with NTP. Initial troponin level is negative. CTA is unremarkable. Attending physician updated and recommends admission to hospital medicine for further evaluation and treatment. I discussed the case with hospital medicine, agreeable to admission. Pt updated and  comfortable with plan.     Other:   I have discussed this case with another health care provider.  Medical complexity: High risk              Clinical Impression:  Final diagnoses:  [R07.9] Chest pain (Primary)  [R06.02] SOB (shortness of breath)  [M54.9] Upper back pain  [M54.2] Neck pain on left side  [Z86.79] History of CAD (coronary artery disease)  [R03.0] Elevated blood pressure reading          ED Disposition Condition    Observation Stable                Alessio Cedeño, PASADAF  04/23/24 1471

## 2024-04-21 NOTE — ASSESSMENT & PLAN NOTE
"Patient's FSGs are controlled on current medication regimen.  Last A1c reviewed-   Lab Results   Component Value Date    HGBA1C 6.0 (H) 04/21/2024     Most recent fingerstick glucose reviewed- No results for input(s): "POCTGLUCOSE" in the last 24 hours.  Current correctional scale  Low  Maintain anti-hyperglycemic dose as follows-   Antihyperglycemics (From admission, onward)      Start     Stop Route Frequency Ordered    04/21/24 1626  insulin aspart U-100 pen 0-5 Units         -- SubQ Before meals & nightly PRN 04/21/24 1526          Home DM regimen:  Ozepmic, Metformin  Hold oral hyperglycemic meds  SSI with POCT accuchecks to achieve blood glucose of 140-180 while hospitalized  Hypoglycemia protocol  Diabetic diet when eating - NPO at midnight for cardiac PET  "

## 2024-04-22 VITALS
RESPIRATION RATE: 18 BRPM | DIASTOLIC BLOOD PRESSURE: 79 MMHG | HEIGHT: 60 IN | BODY MASS INDEX: 43.19 KG/M2 | SYSTOLIC BLOOD PRESSURE: 142 MMHG | TEMPERATURE: 99 F | OXYGEN SATURATION: 98 % | HEART RATE: 74 BPM | WEIGHT: 220 LBS

## 2024-04-22 LAB
ALBUMIN SERPL BCP-MCNC: 4 G/DL (ref 3.5–5.2)
ALP SERPL-CCNC: 51 U/L (ref 55–135)
ALT SERPL W/O P-5'-P-CCNC: 15 U/L (ref 10–44)
ANION GAP SERPL CALC-SCNC: 11 MMOL/L (ref 8–16)
ASCENDING AORTA: 2.22 CM
AST SERPL-CCNC: 13 U/L (ref 10–40)
AV INDEX (PROSTH): 0.84
AV MEAN GRADIENT: 4 MMHG
AV PEAK GRADIENT: 6 MMHG
AV VALVE AREA BY VELOCITY RATIO: 2.48 CM²
AV VALVE AREA: 2.63 CM²
AV VELOCITY RATIO: 0.79
BASOPHILS # BLD AUTO: 0.03 K/UL (ref 0–0.2)
BASOPHILS NFR BLD: 0.5 % (ref 0–1.9)
BILIRUB SERPL-MCNC: 0.3 MG/DL (ref 0.1–1)
BSA FOR ECHO PROCEDURE: 2.06 M2
BUN SERPL-MCNC: 20 MG/DL (ref 8–23)
CALCIUM SERPL-MCNC: 10.2 MG/DL (ref 8.7–10.5)
CFR FLOW - ANTERIOR: 2.02
CFR FLOW - INFERIOR: 2.14
CFR FLOW - LATERAL: 1.8
CFR FLOW - MAX: 2.45
CFR FLOW - MIN: 1.55
CFR FLOW - SEPTAL: 2.04
CFR FLOW - WHOLE HEART: 2
CHLORIDE SERPL-SCNC: 103 MMOL/L (ref 95–110)
CO2 SERPL-SCNC: 23 MMOL/L (ref 23–29)
CREAT SERPL-MCNC: 0.9 MG/DL (ref 0.5–1.4)
CV ECHO LV RWT: 0.44 CM
CV STRESS BASE HR: 76 BPM
DIASTOLIC BLOOD PRESSURE: 93 MMHG
DIFFERENTIAL METHOD BLD: ABNORMAL
DOP CALC AO PEAK VEL: 1.23 M/S
DOP CALC AO VTI: 27.43 CM
DOP CALC LVOT AREA: 3.1 CM2
DOP CALC LVOT DIAMETER: 2 CM
DOP CALC LVOT PEAK VEL: 0.97 M/S
DOP CALC LVOT STROKE VOLUME: 72.16 CM3
DOP CALCLVOT PEAK VEL VTI: 22.98 CM
E WAVE DECELERATION TIME: 154.97 MSEC
E/A RATIO: 0.89
E/E' RATIO: 12.62 M/S
ECHO LV POSTERIOR WALL: 0.9 CM (ref 0.6–1.1)
EJECTION FRACTION- HIGH: 59 %
END DIASTOLIC INDEX-HIGH: 155 ML/M2
END DIASTOLIC INDEX-LOW: 91 ML/M2
END SYSTOLIC INDEX-HIGH: 78 ML/M2
END SYSTOLIC INDEX-LOW: 40 ML/M2
EOSINOPHIL # BLD AUTO: 0.1 K/UL (ref 0–0.5)
EOSINOPHIL NFR BLD: 1.5 % (ref 0–8)
ERYTHROCYTE [DISTWIDTH] IN BLOOD BY AUTOMATED COUNT: 14.1 % (ref 11.5–14.5)
EST. GFR  (NO RACE VARIABLE): >60 ML/MIN/1.73 M^2
FRACTIONAL SHORTENING: 34 % (ref 28–44)
GLUCOSE SERPL-MCNC: 95 MG/DL (ref 70–110)
HCT VFR BLD AUTO: 36.8 % (ref 37–48.5)
HGB BLD-MCNC: 11.6 G/DL (ref 12–16)
IMM GRANULOCYTES # BLD AUTO: 0.01 K/UL (ref 0–0.04)
IMM GRANULOCYTES NFR BLD AUTO: 0.2 % (ref 0–0.5)
INTERVENTRICULAR SEPTUM: 0.79 CM (ref 0.6–1.1)
IVRT: 105.61 MSEC
LA MAJOR: 4.99 CM
LA MINOR: 5.22 CM
LA WIDTH: 3.33 CM
LEFT ATRIUM SIZE: 2.93 CM
LEFT ATRIUM VOLUME INDEX MOD: 17.8 ML/M2
LEFT ATRIUM VOLUME INDEX: 21.8 ML/M2
LEFT ATRIUM VOLUME MOD: 34.53 CM3
LEFT ATRIUM VOLUME: 42.32 CM3
LEFT INTERNAL DIMENSION IN SYSTOLE: 2.69 CM (ref 2.1–4)
LEFT VENTRICLE DIASTOLIC VOLUME INDEX: 37.8 ML/M2
LEFT VENTRICLE DIASTOLIC VOLUME: 73.34 ML
LEFT VENTRICLE MASS INDEX: 54 G/M2
LEFT VENTRICLE SYSTOLIC VOLUME INDEX: 13.8 ML/M2
LEFT VENTRICLE SYSTOLIC VOLUME: 26.7 ML
LEFT VENTRICULAR INTERNAL DIMENSION IN DIASTOLE: 4.08 CM (ref 3.5–6)
LEFT VENTRICULAR MASS: 103.92 G
LV LATERAL E/E' RATIO: 13.67 M/S
LV SEPTAL E/E' RATIO: 11.71 M/S
LYMPHOCYTES # BLD AUTO: 3.2 K/UL (ref 1–4.8)
LYMPHOCYTES NFR BLD: 52.1 % (ref 18–48)
MAGNESIUM SERPL-MCNC: 1.7 MG/DL (ref 1.6–2.6)
MCH RBC QN AUTO: 27.4 PG (ref 27–31)
MCHC RBC AUTO-ENTMCNC: 31.5 G/DL (ref 32–36)
MCV RBC AUTO: 87 FL (ref 82–98)
MONOCYTES # BLD AUTO: 0.5 K/UL (ref 0.3–1)
MONOCYTES NFR BLD: 7.6 % (ref 4–15)
MV PEAK A VEL: 0.92 M/S
MV PEAK E VEL: 0.82 M/S
MV STENOSIS PRESSURE HALF TIME: 44.94 MS
MV VALVE AREA P 1/2 METHOD: 4.9 CM2
NEUTROPHILS # BLD AUTO: 2.4 K/UL (ref 1.8–7.7)
NEUTROPHILS NFR BLD: 38.1 % (ref 38–73)
NRBC BLD-RTO: 0 /100 WBC
OHS CV CPX 1 MINUTE RECOVERY HEART RATE: 101 BPM
OHS CV CPX 85 PERCENT MAX PREDICTED HEART RATE MALE: 129
OHS CV CPX MAX PREDICTED HEART RATE: 152
OHS CV CPX PATIENT IS FEMALE: 1
OHS CV CPX PATIENT IS MALE: 0
OHS CV CPX PEAK DIASTOLIC BLOOD PRESSURE: 75 MMHG
OHS CV CPX PEAK HEAR RATE: 92 BPM
OHS CV CPX PEAK RATE PRESSURE PRODUCT: NORMAL
OHS CV CPX PEAK SYSTOLIC BLOOD PRESSURE: 147 MMHG
OHS CV CPX PERCENT MAX PREDICTED HEART RATE ACHIEVED: 63
OHS CV CPX RATE PRESSURE PRODUCT PRESENTING: NORMAL
OHS QRS DURATION: 76 MS
OHS QTC CALCULATION: 397 MS
PHOSPHATE SERPL-MCNC: 3.6 MG/DL (ref 2.7–4.5)
PLATELET # BLD AUTO: 234 K/UL (ref 150–450)
PMV BLD AUTO: 10.3 FL (ref 9.2–12.9)
POTASSIUM SERPL-SCNC: 3.9 MMOL/L (ref 3.5–5.1)
PROT SERPL-MCNC: 7 G/DL (ref 6–8.4)
RA MAJOR: 3.91 CM
RA PRESSURE ESTIMATED: 3 MMHG
RA WIDTH: 2.91 CM
RBC # BLD AUTO: 4.23 M/UL (ref 4–5.4)
REST FLOW - ANTERIOR: 0.76 CC/MIN/G
REST FLOW - INFERIOR: 0.76 CC/MIN/G
REST FLOW - LATERAL: 0.93 CC/MIN/G
REST FLOW - MAX: 1.16 CC/MIN/G
REST FLOW - MIN: 0.47 CC/MIN/G
REST FLOW - SEPTAL: 0.77 CC/MIN/G
REST FLOW - WHOLE HEART: 0.81 CC/MIN/G
RETIRED EF AND QEF - SEE NOTES: 47 %
SINUS: 2.83 CM
SODIUM SERPL-SCNC: 137 MMOL/L (ref 136–145)
STJ: 2.47 CM
STRESS FLOW - ANTERIOR: 1.5 CC/MIN/G
STRESS FLOW - INFERIOR: 1.63 CC/MIN/G
STRESS FLOW - LATERAL: 1.86 CC/MIN/G
STRESS FLOW - MAX: 2.02 CC/MIN/G
STRESS FLOW - MIN: 1.03 CC/MIN/G
STRESS FLOW - SEPTAL: 1.55 CC/MIN/G
STRESS FLOW - WHOLE HEART: 1.64 CC/MIN/G
SYSTOLIC BLOOD PRESSURE: 191 MMHG
TDI LATERAL: 0.06 M/S
TDI SEPTAL: 0.07 M/S
TDI: 0.07 M/S
TRICUSPID ANNULAR PLANE SYSTOLIC EXCURSION: 2.22 CM
TROPONIN I SERPL DL<=0.01 NG/ML-MCNC: <0.006 NG/ML (ref 0–0.03)
WBC # BLD AUTO: 6.2 K/UL (ref 3.9–12.7)
Z-SCORE OF LEFT VENTRICULAR DIMENSION IN END DIASTOLE: -3.01
Z-SCORE OF LEFT VENTRICULAR DIMENSION IN END SYSTOLE: -1.83

## 2024-04-22 PROCEDURE — 85025 COMPLETE CBC W/AUTO DIFF WBC: CPT | Performed by: HOSPITALIST

## 2024-04-22 PROCEDURE — 25000003 PHARM REV CODE 250: Performed by: HOSPITALIST

## 2024-04-22 PROCEDURE — G0378 HOSPITAL OBSERVATION PER HR: HCPCS

## 2024-04-22 PROCEDURE — 36415 COLL VENOUS BLD VENIPUNCTURE: CPT | Performed by: HOSPITALIST

## 2024-04-22 PROCEDURE — 83735 ASSAY OF MAGNESIUM: CPT | Performed by: HOSPITALIST

## 2024-04-22 PROCEDURE — 84484 ASSAY OF TROPONIN QUANT: CPT | Performed by: HOSPITALIST

## 2024-04-22 PROCEDURE — 80053 COMPREHEN METABOLIC PANEL: CPT | Performed by: HOSPITALIST

## 2024-04-22 PROCEDURE — A9555 RB82 RUBIDIUM: HCPCS | Performed by: HOSPITALIST

## 2024-04-22 PROCEDURE — 84100 ASSAY OF PHOSPHORUS: CPT | Performed by: HOSPITALIST

## 2024-04-22 PROCEDURE — 63600175 PHARM REV CODE 636 W HCPCS: Performed by: HOSPITALIST

## 2024-04-22 RX ORDER — REGADENOSON 0.08 MG/ML
0.4 INJECTION, SOLUTION INTRAVENOUS ONCE
Status: COMPLETED | OUTPATIENT
Start: 2024-04-22 | End: 2024-04-22

## 2024-04-22 RX ORDER — AMINOPHYLLINE 25 MG/ML
75 INJECTION, SOLUTION INTRAVENOUS ONCE
Status: COMPLETED | OUTPATIENT
Start: 2024-04-22 | End: 2024-04-22

## 2024-04-22 RX ADMIN — METOPROLOL TARTRATE 100 MG: 100 TABLET, FILM COATED ORAL at 09:04

## 2024-04-22 RX ADMIN — LOSARTAN POTASSIUM 100 MG: 50 TABLET, FILM COATED ORAL at 09:04

## 2024-04-22 RX ADMIN — IBUPROFEN 600 MG: 200 TABLET, FILM COATED ORAL at 05:04

## 2024-04-22 RX ADMIN — RUBIDIUM CHLORIDE RB-82 31.9 MILLICURIE: 150 INJECTION, SOLUTION INTRAVENOUS at 08:04

## 2024-04-22 RX ADMIN — AMINOPHYLLINE 75 MG: 25 INJECTION, SOLUTION INTRAVENOUS at 08:04

## 2024-04-22 RX ADMIN — CALCIUM CARBONATE (ANTACID) CHEW TAB 500 MG 1000 MG: 500 CHEW TAB at 05:04

## 2024-04-22 RX ADMIN — ASPIRIN 81 MG CHEWABLE TABLET 81 MG: 81 TABLET CHEWABLE at 09:04

## 2024-04-22 RX ADMIN — PRASUGREL 10 MG: 10 TABLET, FILM COATED ORAL at 09:04

## 2024-04-22 RX ADMIN — REGADENOSON 0.4 MG: 0.08 INJECTION, SOLUTION INTRAVENOUS at 08:04

## 2024-04-22 RX ADMIN — PANTOPRAZOLE SODIUM 40 MG: 40 TABLET, DELAYED RELEASE ORAL at 09:04

## 2024-04-22 RX ADMIN — SPIRONOLACTONE 50 MG: 25 TABLET ORAL at 09:04

## 2024-04-22 NOTE — DISCHARGE SUMMARY
[facility] - Med Surg ([place] [address])  [facility] Medicine  Discharge Summary      Patient Name: Christie Damian  MRN: [id number]  AYAKA: [id number]  Patient Class: OP- Observation  Admission Date: 4/21/2024  Hospital Length of Stay: 0 days  Discharge Date and Time: No discharge date for patient encounter.  Attending Physician: Judy Cheatham MD   Discharging Provider: Judy Cheatham MD  Primary Care Provider: Melvi, Primary Doctor  Hospital Medicine Team: [facility] HOSP MED A Judy Cheatham MD  Primary Care Team: [facility] MED A    HPI:   Ms. Christie Damian is a 68 y.o. female with CAD s/p PCI (2023 [place]) on DAPT with Aspirin and Effient, T2DM, HTN, HLD and morbid obesity who presents to the [facility] ED on 4/21 for evaluation of a few days of chest pain.  She endorses midsternal chest pain that radiates to her neck, back, and jaw.  Her symptoms occur both at rest and with exertion.  However at rest, she does feel palpitations.  She did also have some SOB.  She denies any nausea or vomiting, but does have acid reflux.  She also mentions that her BP has been higher than normal.  At baseline, her BP is in the 100s, but lately has been in the 190s.  She denies any tobacco abuse.  She denies any lower extremity swelling.  She does have headaches now after receiving Nitro.    Upon arrival to the ER, vitals were temp 98.1F, HR 79, /76 (max /78).  Labs showed WBC 5, Hgb 12, D Dimer 0.38, Trop <0.006, BNP 20.  EKG showed normal sinus rhythm, no STEMI, .  CXR and CTA chest were negative.  She was given Nitro ointment with improvement in her chest pain, but with worsening headache.  She was admitted to Hospital Medicine for further management.    * No surgery found *      Hospital Course:   Ms. Damian was admitted to Hospital Medicine for management of chest pain.  She was given Nitro ointment in the ED and had no further chest pain.  2D echo was negative.  PET scan was ordered which was negative.  She was  discharged home and told to followup with her Cardiologist.    Ms. Christie Damian was deemed appropriate for discharge.  At the time of discharge, the plan of care was discussed with patient/family, who were agreeable and amenable.  All medications were verbally reviewed and discussed with the patient/family.  They endorsed understanding and compliance.  Informed them that these changes will be available on their discharge paperwork, as well.  Outpatient follow-ups were scheduled, or if unable to be scheduled ambulatory referrals were placed, and ER return precautions were given.  All questions were answered to the patient/family's satisfaction.  Ms. Christie Damian was subsequently discharged in stable condition.       Goals of Care Treatment Preferences:  Code Status: Full Code      Consults:     No new Assessment & Plan notes have been filed under this hospital service since the last note was generated.  Service: Hospital Medicine    Final Active Diagnoses:    Diagnosis Date Noted POA    PRINCIPAL PROBLEM:  Chest pain, rule out acute myocardial infarction [R07.9] 03/17/2019 Yes    CAD (coronary artery disease), native coronary artery [I25.10] 04/21/2024 Yes    Severe obesity (BMI >= 40) [E66.01] 04/21/2024 Yes    Obesity, diabetes, and hypertension syndrome [E11.69, E66.9, E11.59, I15.2] 04/21/2024 Yes    Benign hypertensive heart disease with HF (heart failure) [I11.0] 04/21/2024 Yes    Chronic diastolic heart failure [I50.32] 03/18/2019 Yes    DM II (diabetes mellitus, type II), controlled [E11.9] 03/17/2019 Yes    Hyperlipidemia [E78.5] 03/17/2019 Yes    Hypertension [I10] 03/17/2019 Yes      Problems Resolved During this Admission:       Discharged Condition: fair    Disposition: Home or Self Care    Follow Up:   Follow-up Information       No, Primary Doctor Follow up in 1 week(s).                           Patient Instructions:      Notify your health care provider if you experience any of the  following:  persistent nausea and vomiting or diarrhea     Notify your health care provider if you experience any of the following:  severe uncontrolled pain     Notify your health care provider if you experience any of the following:  difficulty breathing or increased cough       Significant Diagnostic Studies: Labs: CMP   Recent Labs   Lab 04/21/24  1301 04/22/24  0553    137   K 4.3 3.9    103   CO2 23 23   GLU 85 95   BUN 18 20   CREATININE 1.0 0.9   CALCIUM 10.4 10.2   PROT 7.8 7.0   ALBUMIN 4.4 4.0   BILITOT 0.4 0.3   ALKPHOS 57 51*   AST 15 13   ALT 16 15   ANIONGAP 10 11   , CBC   Recent Labs   Lab 04/21/24  1301 04/21/24  1306 04/22/24  0553   WBC 5.53  --  6.20   HGB 12.1  --  11.6*   HCT 39.0   < > 36.8*     --  234    < > = values in this interval not displayed.   , and Troponin   Recent Labs   Lab 04/21/24  1301 04/21/24  1657 04/22/24  0553   TROPONINI <0.006 <0.006 <0.006       Pending Diagnostic Studies:       None           Medications:  Reconciled Home Medications:      Medication List        CONTINUE taking these medications      albuterol 90 mcg/actuation inhaler  Commonly known as: PROVENTIL/VENTOLIN HFA  Inhale 2 puffs into the lungs every 6 (six) hours as needed for Shortness of Breath. Rescue     amitriptyline 10 MG tablet  Commonly known as: ELAVIL  Take 10 mg by mouth nightly as needed for Insomnia.     amLODIPine 5 MG tablet  Commonly known as: NORVASC  Take 5 mg by mouth once daily.     aspirin 81 MG Chew  Take 81 mg by mouth once daily.     atorvastatin 80 MG tablet  Commonly known as: LIPITOR  Take 80 mg by mouth every evening.     cyclobenzaprine 5 MG tablet  Commonly known as: FLEXERIL  Take 5 mg by mouth 2 (two) times daily as needed.     gabapentin 300 MG capsule  Commonly known as: NEURONTIN  Take 1 capsule (300 mg total) by mouth 3 (three) times daily.     losartan 100 MG tablet  Commonly known as: COZAAR  Take 100 mg by mouth once daily.     metFORMIN 500 MG ER  24hr tablet  Commonly known as: GLUCOPHAGE-XR  Take 1,000 mg by mouth 2 (two) times daily.     metoprolol tartrate 100 MG tablet  Commonly known as: LOPRESSOR  Take 100 mg by mouth once daily.     neomycin-polymyxin-hydrocortisone 3.5-10,000-1 mg/mL-unit/mL-% otic suspension  Commonly known as: CORTISPORIN  Place 3 drops into both ears 3 (three) times daily.     OZEMPIC 0.25 mg or 0.5 mg(2 mg/1.5 mL) pen injector  Generic drug: semaglutide  Inject 0.5 mg into the skin every Wednesday.     pantoprazole 40 MG tablet  Commonly known as: PROTONIX  Take 40 mg by mouth daily as needed.     potassium chloride 20 mEq  Commonly known as: K-TAB  Take 20 mEq by mouth once daily.     prasugreL 5 mg Tab  Commonly known as: EFFIENT  Take 10 mg by mouth once daily.     propranoloL 80 MG tablet  Commonly known as: INDERAL  Take 80 mg by mouth 2 (two) times daily.     spironolactone 50 MG tablet  Commonly known as: ALDACTONE  Take 50 mg by mouth once daily.     SYMBICORT 80-4.5 mcg/actuation Hfaa  Generic drug: budesonide-formoterol 80-4.5 mcg  Inhale 2 puffs into the lungs 2 (two) times a day.     torsemide 20 MG Tab  Commonly known as: DEMADEX  Take 20 mg by mouth once daily.     VITAMIN B-12 100 MCG tablet  Generic drug: cyanocobalamin  Take 100 mcg by mouth once daily.     VITAMIN C 500 MG tablet  Generic drug: ascorbic acid (vitamin C)  Take 500 mg by mouth once daily.     vitamin D 1000 units Tab  Commonly known as: VITAMIN D3  Take 1,000 Units by mouth once daily.     vitamin E 100 UNIT capsule  Take 100 Units by mouth once daily.              Indwelling Lines/Drains at time of discharge:   Lines/Drains/Airways       None                   Time spent on the discharge of patient: 35 minutes         Judy Cheatham MD  Department of Hospital Medicine  [facility] ([facility]

## 2024-04-22 NOTE — PROGRESS NOTES
[facility] - Med S[facility] ([place] [address])  [facility] Medicine  Progress Note    Patient Name: Christie Damian  MRN: [id number]  Patient Class: OP- Observation   Admission Date: 4/21/2024  Length of Stay: 0 days  Attending Physician: Judy Cheatham MD  Primary Care Provider: Melvi, Primary Doctor        Subjective:     Principal Problem:Chest pain, rule out acute myocardial infarction        HPI:  Ms. Christie Damian is a 68 y.o. female with CAD s/p PCI (2023 [place]) on DAPT with Aspirin and Effient, T2DM, HTN, HLD and morbid obesity who presents to the [facility] ED on 4/21 for evaluation of a few days of chest pain.  She endorses midsternal chest pain that radiates to her neck, back, and jaw.  Her symptoms occur both at rest and with exertion.  However at rest, she does feel palpitations.  She did also have some SOB.  She denies any nausea or vomiting, but does have acid reflux.  She also mentions that her BP has been higher than normal.  At baseline, her BP is in the 100s, but lately has been in the 190s.  She denies any tobacco abuse.  She denies any lower extremity swelling.  She does have headaches now after receiving Nitro.    Upon arrival to the ER, vitals were temp 98.1F, HR 79, /76 (max /78).  Labs showed WBC 5, Hgb 12, D Dimer 0.38, Trop <0.006, BNP 20.  EKG showed normal sinus rhythm, no STEMI, .  CXR and CTA chest were negative.  She was given Nitro ointment with improvement in her chest pain, but with worsening headache.  She was admitted to Hospital Medicine for further management.    Overview/Hospital Course:  Ms. Damian was admitted to Hospital Medicine for management of chest pain.  She was given Nitro ointment in the ED and had no further chest pain.  2D echo was negative.  PET scan was ordered.    Interval History: No acute events overnight.  Seen after PET scan.  No further chest pain since the ED.  Has no complaints.  Should DC this afternoon assuming negative PET stress.    Review of  Systems   Constitutional:  Negative for chills, fatigue and fever.   Respiratory:  Negative for cough and shortness of breath.    Cardiovascular:  Negative for chest pain, palpitations and leg swelling.   Gastrointestinal:  Negative for abdominal pain, diarrhea, nausea and vomiting.   Genitourinary:  Negative for dysuria and urgency.   Neurological:  Negative for dizziness and headaches.   All other systems reviewed and are negative.    Objective:     Vital Signs (Most Recent):  Temp: 98.7 °F (37.1 °C) (04/22/24 1136)  Pulse: 74 (04/22/24 1136)  Resp: 18 (04/22/24 1136)  BP: (!) 142/79 (04/22/24 1136)  SpO2: 98 % (04/22/24 1136) Vital Signs (24h Range):  Temp:  [98.2 °F (36.8 °C)-98.8 °F (37.1 °C)] 98.7 °F (37.1 °C)  Pulse:  [71-92] 74  Resp:  [16-22] 18  SpO2:  [94 %-99 %] 98 %  BP: (132-191)/(56-93) 142/79     Weight: 99.8 kg (220 lb)  Body mass index is 42.97 kg/m².    Intake/Output Summary (Last 24 hours) at 4/22/2024 1239  Last data filed at 4/22/2024 1154  Gross per 24 hour   Intake 240 ml   Output --   Net 240 ml      Physical Exam  Constitutional:       Appearance: Normal appearance. She is obese.   HENT:      Head: Normocephalic and atraumatic.   Cardiovascular:      Rate and Rhythm: Normal rate and regular rhythm.      Heart sounds: No murmur heard.  Pulmonary:      Effort: Pulmonary effort is normal. No respiratory distress.      Breath sounds: Normal breath sounds. No wheezing or rales.   Abdominal:      General: There is no distension.      Palpations: Abdomen is soft.      Tenderness: There is no abdominal tenderness.   Musculoskeletal:         General: No deformity.   Skin:     General: Skin is warm and dry.   Neurological:      General: No focal deficit present.      Mental Status: She is alert and oriented to person, place, and time. Mental status is at baseline.         Computed MELD 3.0 unavailable. One or more values for this score either were not found within the given timeframe or did not fit  "some other criterion.  Computed MELD-Na unavailable. One or more values for this score either were not found within the given timeframe or did not fit some other criterion.      Significant Labs:  CBC:  Recent Labs   Lab 04/21/24  1301 04/21/24  1306 04/22/24  0553   WBC 5.53  --  6.20   HGB 12.1  --  11.6*   HCT 39.0 38 36.8*     --  234     CMP:  Recent Labs   Lab 04/21/24  1301 04/22/24  0553    137   K 4.3 3.9    103   CO2 23 23   GLU 85 95   BUN 18 20   CREATININE 1.0 0.9   CALCIUM 10.4 10.2   PROT 7.8 7.0   ALBUMIN 4.4 4.0   BILITOT 0.4 0.3   ALKPHOS 57 51*   AST 15 13   ALT 16 15   ANIONGAP 10 11     PTINR:  No results for input(s): "INR" in the last 48 hours.    Significant Procedures:   Dobutamine Stress Test with Color Flow: No results found for this or any previous visit.    2D Echo: No results found for this or any previous visit.    Assessment/Plan:      * Chest pain, rule out acute myocardial infarction  69yo female with multiple risk factors and history of CAD s/p PCI who presents with chest pain  EKG without STEMI or any acute ST/T wave changes  Initial troponin negative, will trend until peak  Heart Score:  6  Continue Aspirin 81mg PO daily  Continue Effient 10mg daily  Continue Lipitor 80mg PO daily  Continue tele  Nitro prn - Was given Nitro ointment in the ED  Cardiac PET pending        CAD (coronary artery disease), native coronary artery  Patient with known CAD s/p stent placement in 2023 in [facility]  Continue  Effient, ASA, and Statin  See chest pain above    Benign hypertensive heart disease with HF (heart failure)  Noted      Obesity, diabetes, and hypertension syndrome  Noted    Severe obesity (BMI >= 40)  Body mass index is 42.97 kg/m². Morbid obesity complicates all aspects of disease management from diagnostic modalities to treatment. Weight loss encouraged and health benefits explained to patient.         Chronic diastolic heart failure  Previous echo with G1DD  BNP " 20 but could be falsely low 2/2 obesity  No pleural effusions or pulmonary edema on CTA chest  Repeat 2D echo normal      DM II (diabetes mellitus, type II), controlled  Patient's FSGs are controlled on current medication regimen.  Last A1c reviewed-   Lab Results   Component Value Date    HGBA1C 6.0 (H) 04/21/2024     Most recent fingerstick glucose reviewed-   Recent Labs   Lab 04/21/24  2040   POCTGLUCOSE 86     Current correctional scale  Low  Maintain anti-hyperglycemic dose as follows-   Antihyperglycemics (From admission, onward)      Start     Stop Route Frequency Ordered    04/21/24 1626  insulin aspart U-100 pen 0-5 Units         -- SubQ Before meals & nightly PRN 04/21/24 1526          Home DM regimen:  Ozepmic, Metformin  Hold oral hyperglycemic meds  SSI with POCT accuchecks to achieve blood glucose of 140-180 while hospitalized  Hypoglycemia protocol  Diabetic diet when eating - NPO at midnight for cardiac PET    Hypertension  Chronic, uncontrolled. Latest blood pressure and vitals reviewed-     Temp:  [98.2 °F (36.8 °C)-98.8 °F (37.1 °C)]   Pulse:  [71-92]   Resp:  [16-22]   BP: (132-191)/(56-93)   SpO2:  [94 %-99 %] .     Reports baseline BP 100s, but lately has been having BP as high as 190s  Continue Losartan 100mg daily  Continue Metoprolol 100mg daily  Continue Aldactone 50mg daily    Hyperlipidemia  Chronic and stable  Continue Statin        VTE Risk Mitigation (From admission, onward)           Ordered     IP VTE LOW RISK PATIENT  Once         04/21/24 1527                    Discharge Planning   VINCENZO: 4/22/2024     Code Status: Full Code   Is the patient medically ready for discharge?:     Reason for patient still in hospital (select all that apply): Imaging                     Judy Cheatham MD  Department of Hospital Medicine   [facility] - Med Surg ([place]-)

## 2024-04-22 NOTE — HOSPITAL COURSE
Ms. Damian was admitted to Hospital Medicine for management of chest pain.  She was given Nitro ointment in the ED and had no further chest pain.  2D echo was negative.  PET scan was ordered which was negative.  She was discharged home and told to followup with her Cardiologist.    Ms. Christie Damian was deemed appropriate for discharge.  At the time of discharge, the plan of care was discussed with patient/family, who were agreeable and amenable.  All medications were verbally reviewed and discussed with the patient/family.  They endorsed understanding and compliance.  Informed them that these changes will be available on their discharge paperwork, as well.  Outpatient follow-ups were scheduled, or if unable to be scheduled ambulatory referrals were placed, and ER return precautions were given.  All questions were answered to the patient/family's satisfaction.  Ms. Christie Damian was subsequently discharged in stable condition.     RNCC Care Coordination Note    Encounter Summary:    RNCC contacted patient since his TCM appointment for today was cancelled due to provider illness.  Appointment was rescheduled for Tuesday, 6/18/19 due to provider availability - patient declined F/U with an alternate clinic provider.  Writer spoke with daughter and patient on speaker phone to discuss his current issues.  Patient/daughter describe foot and ankle swelling (L>R) since being discharged from the hospital.  Patient denies redness and/or pain to LE's.  He denies keeping his feet elevated during the day. ACL lab was unable to obtain home draw yesterday, but patient was contacted and draw was rescheduled for tomorrow (Friday).  Patient denies any changes in how he is breathing.  RNCC discussed the following plan of care with patient/daughter:  -Keep feet elevated above your heart  -Avoid foods high in sodium  -Wear LE compression socks or TEDS  -RNCC to review home lab results with Dr. Costa tomorrow  -RNCC discussed with patient and his daughter the symptoms that would suggest he should be evaluated in the ED.  Both individuals verbalized understanding and agreement with the above plan of care.       RNCC Assessments    See Care Coordination Smartform for complete baseline and current assessment.       Acute Pain Assessment         Chronic Pain Assessment    Chronic Pain:  No       RNCC Interventions                Patient Education:  Yes   Recipients of Education:  Patient, Family   Education Methods used this encounter:  Verbal   Nutrition, Wellness/Lifestyle, Disease Process   Reinforce Care Plan   Interventions Narrative:    RNCC scheduled new clinic F/U appointment.  RNCC collaborated with ACL about plan to obtain labs at home from patient in the AM.  RNCC assessed patient symptoms over the phone and developed a plan of care to cover the issues that patient is reporting.  Reinforced with patient to continue his current plan of care.          Acuity Assessment     Acuity Assessment:   Date Acuity Level   6/13/19 3

## 2024-04-22 NOTE — SUBJECTIVE & OBJECTIVE
Interval History: No acute events overnight.  Seen after PET scan.  No further chest pain since the ED.  Has no complaints.  Should DC this afternoon assuming negative PET stress.    Review of Systems   Constitutional:  Negative for chills, fatigue and fever.   Respiratory:  Negative for cough and shortness of breath.    Cardiovascular:  Negative for chest pain, palpitations and leg swelling.   Gastrointestinal:  Negative for abdominal pain, diarrhea, nausea and vomiting.   Genitourinary:  Negative for dysuria and urgency.   Neurological:  Negative for dizziness and headaches.   All other systems reviewed and are negative.    Objective:     Vital Signs (Most Recent):  Temp: 98.7 °F (37.1 °C) (04/22/24 1136)  Pulse: 74 (04/22/24 1136)  Resp: 18 (04/22/24 1136)  BP: (!) 142/79 (04/22/24 1136)  SpO2: 98 % (04/22/24 1136) Vital Signs (24h Range):  Temp:  [98.2 °F (36.8 °C)-98.8 °F (37.1 °C)] 98.7 °F (37.1 °C)  Pulse:  [71-92] 74  Resp:  [16-22] 18  SpO2:  [94 %-99 %] 98 %  BP: (132-191)/(56-93) 142/79     Weight: 99.8 kg (220 lb)  Body mass index is 42.97 kg/m².    Intake/Output Summary (Last 24 hours) at 4/22/2024 1239  Last data filed at 4/22/2024 1154  Gross per 24 hour   Intake 240 ml   Output --   Net 240 ml      Physical Exam  Constitutional:       Appearance: Normal appearance. She is obese.   HENT:      Head: Normocephalic and atraumatic.   Cardiovascular:      Rate and Rhythm: Normal rate and regular rhythm.      Heart sounds: No murmur heard.  Pulmonary:      Effort: Pulmonary effort is normal. No respiratory distress.      Breath sounds: Normal breath sounds. No wheezing or rales.   Abdominal:      General: There is no distension.      Palpations: Abdomen is soft.      Tenderness: There is no abdominal tenderness.   Musculoskeletal:         General: No deformity.   Skin:     General: Skin is warm and dry.   Neurological:      General: No focal deficit present.      Mental Status: She is alert and oriented to  "person, place, and time. Mental status is at baseline.         Computed MELD 3.0 unavailable. One or more values for this score either were not found within the given timeframe or did not fit some other criterion.  Computed MELD-Na unavailable. One or more values for this score either were not found within the given timeframe or did not fit some other criterion.      Significant Labs:  CBC:  Recent Labs   Lab 04/21/24  1301 04/21/24  1306 04/22/24  0553   WBC 5.53  --  6.20   HGB 12.1  --  11.6*   HCT 39.0 38 36.8*     --  234     CMP:  Recent Labs   Lab 04/21/24  1301 04/22/24  0553    137   K 4.3 3.9    103   CO2 23 23   GLU 85 95   BUN 18 20   CREATININE 1.0 0.9   CALCIUM 10.4 10.2   PROT 7.8 7.0   ALBUMIN 4.4 4.0   BILITOT 0.4 0.3   ALKPHOS 57 51*   AST 15 13   ALT 16 15   ANIONGAP 10 11     PTINR:  No results for input(s): "INR" in the last 48 hours.    Significant Procedures:   Dobutamine Stress Test with Color Flow: No results found for this or any previous visit.    2D Echo: No results found for this or any previous visit.  "

## 2024-04-22 NOTE — ASSESSMENT & PLAN NOTE
67yo female with multiple risk factors and history of CAD s/p PCI who presents with chest pain  EKG without STEMI or any acute ST/T wave changes  Initial troponin negative, will trend until peak  Heart Score:  6  Continue Aspirin 81mg PO daily  Continue Effient 10mg daily  Continue Lipitor 80mg PO daily  Continue tele  Nitro prn - Was given Nitro ointment in the ED  Cardiac PET pending

## 2024-04-22 NOTE — ASSESSMENT & PLAN NOTE
Patient with known CAD s/p stent placement in 2023 in [facility]  Continue  Effient, ASA, and Statin  See chest pain above

## 2024-04-22 NOTE — PLAN OF CARE
Problem: Adult Inpatient Plan of Care  Goal: Plan of Care Review  Outcome: Met  Flowsheets (Taken 4/22/2024 0078)  Plan of Care Reviewed With: patient  Goal: Patient-Specific Goal (Individualized)  Outcome: Met  Goal: Optimal Comfort and Wellbeing  Outcome: Met  Goal: Readiness for Transition of Care  Outcome: Met  Pt is AAOX4, pt educated on discharge meds and follow up appointments, AVS given to pt.  Pt voiced understanding. Pt refused wheelchair and ambulated to the [employer]

## 2024-04-22 NOTE — PLAN OF CARE
Eduardo Aguero - Med Surg ([facility]-16)  Discharge Assessment    Primary Care Provider: No, Primary Doctor     Discharge Assessment (most recent)       BRIEF DISCHARGE ASSESSMENT - 04/22/24 1636          Discharge Planning    Assessment Type Discharge Planning Brief Assessment (P)      Resource/Environmental Concerns none (P)      Support Systems Family members;Children (P)      Equipment Currently Used at Home none (P)      Current Living Arrangements apartment (P)      Patient/Family Anticipates Transition to home with family (P)      Patient/Family Anticipated Services at Transition none (P)      DME Needed Upon Discharge  none (P)      Discharge Plan A Home with family (P)      Discharge Plan B Home (P)         Physical Activity    On average, how many days per week do you engage in moderate to strenuous exercise (like a brisk walk)? 3 days (P)      On average, how many minutes do you engage in exercise at this level? 50 min (P)         Financial Resource Strain    How hard is it for you to pay for the very basics like food, housing, medical care, and heating? Not very hard (P)         Housing Stability    In the last 12 months, was there a time when you were not able to pay the mortgage or rent on time? No (P)      In the past 12 months, how many times have you moved where you were living? 1 (P)      At any time in the past 12 months, were you homeless or living in a shelter (including now)? No (P)         Transportation Needs    In the past 12 months, has lack of transportation kept you from medical appointments or from getting medications? No (P)      In the past 12 months, has lack of transportation kept you from meetings, work, or from getting things needed for daily living? No (P)         Food Insecurity    Within the past 12 months, you worried that your food would run out before you got the money to buy more. Never true (P)      Within the past 12 months, the food you bought just didn't last and you didn't  have money to get more. Never true (P)         Social Connections    In a typical week, how many times do you talk on the phone with family, friends, or neighbors? More than three times a week (P)      How often do you get together with friends or relatives? More than three times a week (P)         Alcohol Use    Q1: How often do you have a drink containing alcohol? Never (P)      Q2: How many drinks containing alcohol do you have on a typical day when you are drinking? Patient does not drink (P)      Q3: How often do you have six or more drinks on one occasion? Never (P)                  Discharge Plan A and Plan B have been determined by review of patient's clinical status, future medical and therapeutic needs, and coverage/benefits for post-acute care in coordination with multidisciplinary team members.                         BHAKTI Lancaster, LMSW  [facility]  Case Management  Ext. [phone]

## 2024-04-22 NOTE — ASSESSMENT & PLAN NOTE
Patient's FSGs are controlled on current medication regimen.  Last A1c reviewed-   Lab Results   Component Value Date    HGBA1C 6.0 (H) 04/21/2024     Most recent fingerstick glucose reviewed-   Recent Labs   Lab 04/21/24 2040   POCTGLUCOSE 86     Current correctional scale  Low  Maintain anti-hyperglycemic dose as follows-   Antihyperglycemics (From admission, onward)    Start     Stop Route Frequency Ordered    04/21/24 1626  insulin aspart U-100 pen 0-5 Units         -- SubQ Before meals & nightly PRN 04/21/24 1526        Home DM regimen:  Ozepmic, Metformin  Hold oral hyperglycemic meds  SSI with POCT accuchecks to achieve blood glucose of 140-180 while hospitalized  Hypoglycemia protocol  Diabetic diet when eating - NPO at midnight for cardiac PET

## 2024-04-22 NOTE — ASSESSMENT & PLAN NOTE
Chronic, uncontrolled. Latest blood pressure and vitals reviewed-     Temp:  [98.2 °F (36.8 °C)-98.8 °F (37.1 °C)]   Pulse:  [71-92]   Resp:  [16-22]   BP: (132-191)/(56-93)   SpO2:  [94 %-99 %] .     Reports baseline BP 100s, but lately has been having BP as high as 190s  Continue Losartan 100mg daily  Continue Metoprolol 100mg daily  Continue Aldactone 50mg daily

## 2024-04-22 NOTE — ASSESSMENT & PLAN NOTE
Previous echo with G1DD  BNP 20 but could be falsely low 2/2 obesity  No pleural effusions or pulmonary edema on CTA chest  Repeat 2D echo normal

## 2024-04-23 NOTE — PLAN OF CARE
Eduardo Aguero - Med Surg ([facility]-16)  Discharge Final Note    Primary Care Provider: No, Primary Doctor    Expected Discharge Date: 4/22/2024    Final Discharge Note (most recent)       Final Note - 04/23/24 1025          Final Note    Assessment Type Final Discharge Note (P)      Anticipated Discharge Disposition Home or Self Care (P)      What phone number can be called within the next 1-3 days to see how you are doing after discharge? [phone] (P)         Post-Acute Status    Post-Acute Authorization Other (P)      Other Status No Post-Acute Service Needs (P)                      Important Message from Medicare             Contact Info       No, Primary Doctor   Relationship: PCP - General        Next Steps: Follow up in 1 week(s)          Patient dc home.                        BHAKTI Lancaster, LMSW  [facility]  Case Management  Ext. [phone]

## 2024-10-25 DIAGNOSIS — Z12.31 ENCOUNTER FOR SCREENING MAMMOGRAM FOR MALIGNANT NEOPLASM OF BREAST: Primary | ICD-10-CM

## 2024-11-21 ENCOUNTER — HOSPITAL ENCOUNTER (EMERGENCY)
Facility: HOSPITAL | Age: 69
Discharge: HOME | End: 2024-11-21
Attending: EMERGENCY MEDICINE
Payer: MEDICARE

## 2024-11-21 VITALS
WEIGHT: 216 LBS | HEART RATE: 86 BPM | BODY MASS INDEX: 42.41 KG/M2 | DIASTOLIC BLOOD PRESSURE: 58 MMHG | SYSTOLIC BLOOD PRESSURE: 121 MMHG | TEMPERATURE: 98 F | HEIGHT: 60 IN | OXYGEN SATURATION: 97 % | RESPIRATION RATE: 20 BRPM

## 2024-11-21 DIAGNOSIS — K52.9 GASTROENTERITIS: Primary | ICD-10-CM

## 2024-11-21 DIAGNOSIS — R53.83 FATIGUE: ICD-10-CM

## 2024-11-21 DIAGNOSIS — R11.2 NAUSEA AND VOMITING, UNSPECIFIED VOMITING TYPE: ICD-10-CM

## 2024-11-21 DIAGNOSIS — S80.01XA CONTUSION OF RIGHT KNEE, INITIAL ENCOUNTER: ICD-10-CM

## 2024-11-21 LAB
ALBUMIN SERPL BCP-MCNC: 3.9 G/DL (ref 3.5–5.2)
ALP SERPL-CCNC: 57 U/L (ref 40–150)
ALT SERPL W/O P-5'-P-CCNC: 26 U/L (ref 10–44)
ANION GAP SERPL CALC-SCNC: 11 MMOL/L (ref 8–16)
AST SERPL-CCNC: 22 U/L (ref 10–40)
BASOPHILS NFR BLD: 1 % (ref 0–1.9)
BILIRUB SERPL-MCNC: 0.2 MG/DL (ref 0.1–1)
BILIRUB UR QL STRIP: NEGATIVE
BNP SERPL-MCNC: <10 PG/ML (ref 0–99)
BUN SERPL-MCNC: 11 MG/DL (ref 8–23)
CALCIUM SERPL-MCNC: 10 MG/DL (ref 8.7–10.5)
CHLORIDE SERPL-SCNC: 105 MMOL/L (ref 95–110)
CLARITY UR: CLEAR
CO2 SERPL-SCNC: 22 MMOL/L (ref 23–29)
COLOR UR: YELLOW
CREAT SERPL-MCNC: 1 MG/DL (ref 0.5–1.4)
DIFFERENTIAL METHOD BLD: ABNORMAL
EOSINOPHIL NFR BLD: 1 % (ref 0–8)
ERYTHROCYTE [DISTWIDTH] IN BLOOD BY AUTOMATED COUNT: 13.8 % (ref 11.5–14.5)
EST. GFR  (NO RACE VARIABLE): >60 ML/MIN/1.73 M^2
GLUCOSE SERPL-MCNC: 86 MG/DL (ref 70–110)
GLUCOSE UR QL STRIP: NEGATIVE
HCT VFR BLD AUTO: 37 % (ref 37–48.5)
HGB BLD-MCNC: 12.1 G/DL (ref 12–16)
HGB UR QL STRIP: NEGATIVE
IMM GRANULOCYTES # BLD AUTO: ABNORMAL K/UL (ref 0–0.04)
IMM GRANULOCYTES NFR BLD AUTO: ABNORMAL % (ref 0–0.5)
INFLUENZA A, MOLECULAR: NEGATIVE
INFLUENZA B, MOLECULAR: NEGATIVE
KETONES UR QL STRIP: ABNORMAL
LEUKOCYTE ESTERASE UR QL STRIP: NEGATIVE
LIPASE SERPL-CCNC: 22 U/L (ref 4–60)
LYMPHOCYTES NFR BLD: 68 % (ref 18–48)
MAGNESIUM SERPL-MCNC: 1.9 MG/DL (ref 1.6–2.6)
MCH RBC QN AUTO: 27.9 PG (ref 27–31)
MCHC RBC AUTO-ENTMCNC: 32.7 G/DL (ref 32–36)
MCV RBC AUTO: 86 FL (ref 82–98)
MONOCYTES NFR BLD: 8 % (ref 4–15)
NEUTROPHILS NFR BLD: 21 % (ref 38–73)
NEUTS BAND NFR BLD MANUAL: 1 %
NITRITE UR QL STRIP: NEGATIVE
NRBC BLD-RTO: 0 /100 WBC
PH UR STRIP: 6 [PH] (ref 5–8)
PLATELET # BLD AUTO: 229 K/UL (ref 150–450)
PLATELET BLD QL SMEAR: ABNORMAL
PMV BLD AUTO: 10.1 FL (ref 9.2–12.9)
POTASSIUM SERPL-SCNC: 3.8 MMOL/L (ref 3.5–5.1)
PROT SERPL-MCNC: 7.5 G/DL (ref 6–8.4)
PROT UR QL STRIP: ABNORMAL
RBC # BLD AUTO: 4.33 M/UL (ref 4–5.4)
SARS-COV-2 RDRP RESP QL NAA+PROBE: NEGATIVE
SODIUM SERPL-SCNC: 138 MMOL/L (ref 136–145)
SP GR UR STRIP: 1.03 (ref 1–1.03)
SPECIMEN SOURCE: NORMAL
TROPONIN I SERPL DL<=0.01 NG/ML-MCNC: <0.006 NG/ML (ref 0–0.03)
URN SPEC COLLECT METH UR: ABNORMAL
UROBILINOGEN UR STRIP-ACNC: NEGATIVE EU/DL
WBC # BLD AUTO: 3.59 K/UL (ref 3.9–12.7)

## 2024-11-21 PROCEDURE — 63600175 PHARM REV CODE 636 W HCPCS: Performed by: PHYSICIAN ASSISTANT

## 2024-11-21 PROCEDURE — 96374 THER/PROPH/DIAG INJ IV PUSH: CPT

## 2024-11-21 PROCEDURE — 83690 ASSAY OF LIPASE: CPT | Performed by: PHYSICIAN ASSISTANT

## 2024-11-21 PROCEDURE — 85007 BL SMEAR W/DIFF WBC COUNT: CPT | Performed by: PHYSICIAN ASSISTANT

## 2024-11-21 PROCEDURE — 84484 ASSAY OF TROPONIN QUANT: CPT | Performed by: PHYSICIAN ASSISTANT

## 2024-11-21 PROCEDURE — 81003 URINALYSIS AUTO W/O SCOPE: CPT | Performed by: PHYSICIAN ASSISTANT

## 2024-11-21 PROCEDURE — 80053 COMPREHEN METABOLIC PANEL: CPT | Performed by: PHYSICIAN ASSISTANT

## 2024-11-21 PROCEDURE — 93005 ELECTROCARDIOGRAM TRACING: CPT

## 2024-11-21 PROCEDURE — 87635 SARS-COV-2 COVID-19 AMP PRB: CPT | Performed by: PHYSICIAN ASSISTANT

## 2024-11-21 PROCEDURE — 93010 ELECTROCARDIOGRAM REPORT: CPT | Mod: ,,, | Performed by: INTERNAL MEDICINE

## 2024-11-21 PROCEDURE — 83735 ASSAY OF MAGNESIUM: CPT | Performed by: PHYSICIAN ASSISTANT

## 2024-11-21 PROCEDURE — 99285 EMERGENCY DEPT VISIT HI MDM: CPT | Mod: 25

## 2024-11-21 PROCEDURE — 87502 INFLUENZA DNA AMP PROBE: CPT | Performed by: PHYSICIAN ASSISTANT

## 2024-11-21 PROCEDURE — 83880 ASSAY OF NATRIURETIC PEPTIDE: CPT | Performed by: PHYSICIAN ASSISTANT

## 2024-11-21 PROCEDURE — 85027 COMPLETE CBC AUTOMATED: CPT | Performed by: PHYSICIAN ASSISTANT

## 2024-11-21 RX ORDER — ONDANSETRON HYDROCHLORIDE 2 MG/ML
4 INJECTION, SOLUTION INTRAVENOUS
Status: COMPLETED | OUTPATIENT
Start: 2024-11-21 | End: 2024-11-21

## 2024-11-21 RX ORDER — ONDANSETRON 4 MG/1
4 TABLET, ORALLY DISINTEGRATING ORAL EVERY 6 HOURS PRN
Qty: 20 TABLET | Refills: 0 | Status: SHIPPED | OUTPATIENT
Start: 2024-11-21

## 2024-11-21 RX ADMIN — ONDANSETRON 4 MG: 2 INJECTION INTRAMUSCULAR; INTRAVENOUS at 02:11

## 2024-11-21 NOTE — DISCHARGE INSTRUCTIONS

## 2024-11-21 NOTE — ED PROVIDER NOTES
"Encounter Date: 11/21/2024       History     Chief Complaint   Patient presents with    Diarrhea     C/o N/V/D x2 days. Reports fatigue and ABD discomfort. Denies taking anything for sx.     Fall     Pt reports falling today in bathroom due to generalized fatigue. C/o R knee pain. -hit head, -LOC. Denies other sx or complaints.     69-year-old female presents to ED with chief complaint of 2 day onset nausea, vomiting, diarrhea and intermittent generalized abdominal pain.  Known sick contacts to multiple family members with similar symptoms.  No blood in stool or vomit.  No diarrhea today.  Patient reports she has had limited ability to tolerate food over past few days but she has been able to drink water.  No current abdominal pain but she does report generalized fatigue secondary to her vomiting and diarrhea.  She reports while getting up this morning to use bathroom her right leg "gave out" due to her fatigue, causing her to contused right knee.  No head injury or LOC.  She was able to get back to standing position without any additional complications.  Denying any associated chest pain, cough, shortness of breath, nasal congestion or sore throat, urinary complaints.  No other acute complaints at this time    The history is provided by the patient.     Review of patient's allergies indicates:   Allergen Reactions    Codeine Nausea And Vomiting    Lisinopril      Past Medical History:   Diagnosis Date    Arthritis     Cataract     Diabetes mellitus     Hyperlipidemia     Hypertension     Pre-diabetes     Sciatica      Past Surgical History:   Procedure Laterality Date    CORONARY STENT PLACEMENT      2 stents    HYSTERECTOMY      LEG SURGERY      TONSILLECTOMY       Family History   Problem Relation Name Age of Onset    Diabetes Mother      Glaucoma Mother      No Known Problems Father      No Known Problems Sister      No Known Problems Brother      No Known Problems Maternal Aunt      No Known Problems Maternal " Uncle      No Known Problems Paternal Aunt      No Known Problems Paternal Uncle      No Known Problems Maternal Grandmother      No Known Problems Maternal Grandfather      No Known Problems Paternal Grandmother      No Known Problems Paternal Grandfather      Amblyopia Neg Hx      Blindness Neg Hx      Cancer Neg Hx      Cataracts Neg Hx      Hypertension Neg Hx      Macular degeneration Neg Hx      Retinal detachment Neg Hx      Strabismus Neg Hx      Stroke Neg Hx      Thyroid disease Neg Hx       Social History     Tobacco Use    Smoking status: Never    Smokeless tobacco: Never   Substance Use Topics    Alcohol use: No    Drug use: Never     Review of Systems   Constitutional:  Positive for fatigue. Negative for chills and fever.   HENT:  Negative for congestion, ear pain and sore throat.    Respiratory:  Negative for cough and shortness of breath.    Cardiovascular:  Negative for chest pain.   Gastrointestinal:  Positive for abdominal pain, diarrhea, nausea and vomiting. Negative for blood in stool and constipation.   Genitourinary:  Negative for dysuria and flank pain.   Musculoskeletal:  Positive for arthralgias. Negative for myalgias, neck pain and neck stiffness.   Neurological:  Negative for dizziness, syncope, weakness, light-headedness, numbness and headaches.       Physical Exam     Initial Vitals [11/21/24 0909]   BP Pulse Resp Temp SpO2   (!) 140/72 85 20 97.9 °F (36.6 °C) 99 %      MAP       --         Physical Exam    Vitals reviewed.  Constitutional: She appears well-developed and well-nourished. She is cooperative. She does not have a sickly appearance. She does not appear ill. No distress.   Pleasant, resting comfortably on bed, no apparent distress   HENT:   Head: Normocephalic and atraumatic.   Eyes: EOM are normal.   Neck: Neck supple.   Normal range of motion.  Cardiovascular:  Normal rate, regular rhythm and normal heart sounds.           Pulmonary/Chest: Effort normal and breath sounds  normal.   Abdominal: Abdomen is soft. There is abdominal tenderness in the epigastric area.   Mild epigastric tenderness.  Denying tenderness towards quadrant or lower abdomen.  No guarding.   Musculoskeletal:         General: No tenderness.      Cervical back: Normal range of motion and neck supple.      Comments: Mild reproducible tenderness over anterior right knee patella with no obvious physical or palpable deformities.  No skin changes or wounds.  Distal sensation intact with intact DP pulse.  No lower extremity edema.     Neurological: She is alert and oriented to person, place, and time. She is not disoriented. GCS eye subscore is 4. GCS verbal subscore is 5. GCS motor subscore is 6.   Skin: Skin is warm and dry.   Psychiatric: She has a normal mood and affect. Her speech is normal and behavior is normal. Thought content normal.         ED Course   Procedures  Labs Reviewed   CBC W/ AUTO DIFFERENTIAL - Abnormal       Result Value    WBC 3.59 (*)     RBC 4.33      Hemoglobin 12.1      Hematocrit 37.0      MCV 86      MCH 27.9      MCHC 32.7      RDW 13.8      Platelets 229      MPV 10.1      Immature Granulocytes CANCELED      Immature Grans (Abs) CANCELED      nRBC 0      Gran % 21.0 (*)     Lymph % 68.0 (*)     Mono % 8.0      Eosinophil % 1.0      Basophil % 1.0      Bands 1.0      Platelet Estimate Appears normal      Differential Method Manual     COMPREHENSIVE METABOLIC PANEL - Abnormal    Sodium 138      Potassium 3.8      Chloride 105      CO2 22 (*)     Glucose 86      BUN 11      Creatinine 1.0      Calcium 10.0      Total Protein 7.5      Albumin 3.9      Total Bilirubin 0.2      Alkaline Phosphatase 57      AST 22      ALT 26      eGFR >60      Anion Gap 11     URINALYSIS, REFLEX TO URINE CULTURE - Abnormal    Specimen UA Urine, Clean Catch      Color, UA Yellow      Appearance, UA Clear      pH, UA 6.0      Specific Gravity, UA 1.030      Protein, UA Trace (*)     Glucose, UA Negative       Ketones, UA Trace (*)     Bilirubin (UA) Negative      Occult Blood UA Negative      Nitrite, UA Negative      Urobilinogen, UA Negative      Leukocytes, UA Negative      Narrative:     Specimen Source->Urine   INFLUENZA A & B BY MOLECULAR    Influenza A, Molecular Negative      Influenza B, Molecular Negative      Flu A & B Source Nasal swab     MAGNESIUM    Magnesium 1.9     TROPONIN I    Troponin I <0.006     LIPASE    Lipase 22     SARS-COV-2 RNA AMPLIFICATION, QUAL    SARS-CoV-2 RNA, Amplification, Qual Negative     B-TYPE NATRIURETIC PEPTIDE    BNP <10          ECG Results              EKG 12-lead (In process)        Collection Time Result Time QRS Duration [facility] QTC Calculation    11/21/24 10:12:06 11/21/24 11:02:07 80 405                     In process by Interface, Lab In [facility] (11/21/24 11:02:18)                   Narrative:    Test Reason : R53.83,    Vent. Rate :  78 BPM     Atrial Rate :  78 BPM     P-R Int : 202 ms          QRS Dur :  80 ms      QT Int : 356 ms       P-R-T Axes :  83  23  69 degrees    QTcB Int : 405 ms    Normal sinus rhythm  Normal ECG  When compared with ECG of 22-Apr-2024 08:18,  No significant change was found    Referred By: AAAREFERRAL SELF           Confirmed By:                                   Imaging Results              X-Ray Chest 1 View (Final result)  Result time 11/21/24 11:10:55      Final result by Jose Morales III, MD (11/21/24 11:10:55)                   Impression:      No acute process seen.      Electronically signed by: Jose Morales MD  Date:    11/21/2024  Time:    11:10               Narrative:    EXAMINATION:  XR CHEST 1 VIEW    CLINICAL HISTORY:  Other fatigue    FINDINGS:  Chest one view:    Heart size is normal.  Lungs are clear and the bones are noncontributory.                                       X-Ray Knee 3 View Right (Final result)  Result time 11/21/24 11:10:33      Final result by Jose Morales III, MD (11/21/24 11:10:33)               "     Impression:      No acute process seen.      Electronically signed by: Jose Morales MD  Date:    11/21/2024  Time:    11:10               Narrative:    EXAMINATION:  XR KNEE 3 VIEW RIGHT    CLINICAL HISTORY:  Unspecified fall, initial encounter    FINDINGS:  Knee three views right.    There is an osteochondral defect of the medial femoral condyle.  No acute fracture dislocation bone destruction seen.  There is a joint effusion.  There are spurs on the patella.                                       Medications   ondansetron injection 4 mg (4 mg Intravenous Given 11/21/24 1410)     Medical Decision Making  Patient presents with concern of 2 day onset nausea, vomiting and diarrhea with generalized fatigue.  Patient reports feeling fatigued this morning in her right leg "gave out", resulting in her falling and contusing right knee.  No head injury or LOC.  Afebrile with vitals grossly unremarkable.  Patient in no distress on exam    DDx:  Including but not limited to [place], dehydration, electrolyte abnormality, viral, GERD, intestinal spasm, gastroenteritis, gastritis, ulcer, cholecystitis, cholelithiasis, gallstones, pancreatitis, ileus, small bowel obstruction, appendicitis, diverticulitis, colitis, constipation, intestinal gas pain, fall, contusing, strain, sprain, dislocation, fracture    Amount and/or Complexity of Data Reviewed  Labs: ordered.  Radiology: ordered.               ED Course as of 11/21/24 1706   Thu Nov 21, 2024   1114 Troponin I  WNL [KS]   1114 Brain natriuretic peptide  WNL [KS]   1114 Magnesium  WNL [KS]   1114 Comprehensive metabolic panel(!)  Unremarkable [KS]   1114 Lipase  WNL [KS]   1114 X-Ray Chest 1 View  No acute findings per Radiology review [KS]   1114 X-Ray Knee 3 View Right  No acute fracture visualized [KS]   1124 CBC auto differential(!)  Grossly unremarkable [KS]   1124 COVID-19 Rapid Screening  Negative [KS]   1124 Influenza A & B by Molecular  Negative [KS]   1519 " Urinalysis, Reflex to Urine Culture Urine, Clean Catch(!)  Unremarkable [KS]   1520 Patient continues to rest comfortably.  Nausea has resolved.  Patient eating and drinking without complications and stating she was ready to return home.  Patient presenting symptoms are likely viral as she has had multiple family members with similar symptoms.  Will plan to continue with supportive care with prescription for Zofran.  Encouraged to continue advancing diet as tolerated with close PCP follow-up and high ED return precautions.  Patient states her understanding and agrees with plan [KS]      ED Course User Index  [KS] Antonio Chaudhari PA-C                           Clinical Impression:  Final diagnoses:  [R53.83] Fatigue  [K52.9] Gastroenteritis (Primary)  [S80.01XA] Contusion of right knee, initial encounter  [R11.2] Nausea and vomiting, unspecified vomiting type          ED Disposition Condition    Discharge Stable          ED Prescriptions       Medication Sig Dispense Start Date End Date Auth. Provider    ondansetron (ZOFRAN-ODT) 4 MG TbDL Take 1 tablet (4 mg total) by mouth every 6 (six) hours as needed. 20 tablet 11/21/2024 -- Antonio Chaudhari PA-C          Follow-up Information       Follow up With Specialties Details Why Contact Info    Your Doctor                 Antonio Chaudhari PA-C  11/21/24 3952

## 2024-11-21 NOTE — ED TRIAGE NOTES
"Patient arrives to the ED w/ complaints of N/V/D x 2 days and fall. Reports number of V/D episodes unknown, but it feels like her "whole stomach came out." Reports inability to tolerate any food or liquids. LBM yesterday evening. Reports nausea currently. Reports falling in the bathroom due to fatigue from N/V/D. Reports pain to R knee from fall. Denies hitting head, LOC. Reports bilateral rib pain from vomiting. Rib and knee pain rated 9 out of 10. Reports headache, dizziness. Denies CP, SOB. Denies taking any medications for symptoms. Patient appears in no acute distress. Son-in-law at bedside.  "

## 2024-11-25 LAB
OHS QRS DURATION: 80 MS
OHS QTC CALCULATION: 405 MS

## 2024-12-30 ENCOUNTER — HOSPITAL ENCOUNTER (EMERGENCY)
Facility: HOSPITAL | Age: 69
Discharge: HOME | End: 2024-12-30
Attending: EMERGENCY MEDICINE
Payer: MEDICARE

## 2024-12-30 VITALS
BODY MASS INDEX: 42.41 KG/M2 | RESPIRATION RATE: 17 BRPM | SYSTOLIC BLOOD PRESSURE: 120 MMHG | OXYGEN SATURATION: 95 % | WEIGHT: 216 LBS | HEART RATE: 64 BPM | TEMPERATURE: 99 F | HEIGHT: 60 IN | DIASTOLIC BLOOD PRESSURE: 59 MMHG

## 2024-12-30 DIAGNOSIS — Z13.6 SCREENING FOR CARDIOVASCULAR CONDITION: ICD-10-CM

## 2024-12-30 DIAGNOSIS — R06.89 HYPERCAPNIA: ICD-10-CM

## 2024-12-30 DIAGNOSIS — J11.1 INFLUENZA: Primary | ICD-10-CM

## 2024-12-30 DIAGNOSIS — R07.9 CHEST PAIN: ICD-10-CM

## 2024-12-30 LAB
ALBUMIN SERPL BCP-MCNC: 4 G/DL (ref 3.5–5.2)
ALLENS TEST: ABNORMAL
ALLENS TEST: NORMAL
ALP SERPL-CCNC: 64 U/L (ref 40–150)
ALT SERPL W/O P-5'-P-CCNC: 21 U/L (ref 10–44)
ANION GAP SERPL CALC-SCNC: 12 MMOL/L (ref 8–16)
AST SERPL-CCNC: 17 U/L (ref 10–40)
BASOPHILS # BLD AUTO: 0.02 K/UL (ref 0–0.2)
BASOPHILS NFR BLD: 0.2 % (ref 0–1.9)
BILIRUB SERPL-MCNC: 0.5 MG/DL (ref 0.1–1)
BNP SERPL-MCNC: 101 PG/ML (ref 0–99)
BUN SERPL-MCNC: 19 MG/DL (ref 8–23)
CALCIUM SERPL-MCNC: 9.9 MG/DL (ref 8.7–10.5)
CHLORIDE SERPL-SCNC: 103 MMOL/L (ref 95–110)
CO2 SERPL-SCNC: 24 MMOL/L (ref 23–29)
CREAT SERPL-MCNC: 1.4 MG/DL (ref 0.5–1.4)
DIFFERENTIAL METHOD BLD: ABNORMAL
EOSINOPHIL # BLD AUTO: 0 K/UL (ref 0–0.5)
EOSINOPHIL NFR BLD: 0.3 % (ref 0–8)
ERYTHROCYTE [DISTWIDTH] IN BLOOD BY AUTOMATED COUNT: 14.3 % (ref 11.5–14.5)
EST. GFR  (NO RACE VARIABLE): 40.7 ML/MIN/1.73 M^2
GLUCOSE SERPL-MCNC: 109 MG/DL (ref 70–110)
HCO3 UR-SCNC: 22.3 MMOL/L (ref 24–28)
HCO3 UR-SCNC: 25.2 MMOL/L (ref 24–28)
HCT VFR BLD AUTO: 37.3 % (ref 37–48.5)
HGB BLD-MCNC: 11.6 G/DL (ref 12–16)
IMM GRANULOCYTES # BLD AUTO: 0.04 K/UL (ref 0–0.04)
IMM GRANULOCYTES NFR BLD AUTO: 0.5 % (ref 0–0.5)
INFLUENZA A, MOLECULAR: POSITIVE
INFLUENZA B, MOLECULAR: NEGATIVE
LDH SERPL L TO P-CCNC: 0.82 MMOL/L (ref 0.5–2.2)
LDH SERPL L TO P-CCNC: 2.75 MMOL/L (ref 0.5–2.2)
LYMPHOCYTES # BLD AUTO: 1.8 K/UL (ref 1–4.8)
LYMPHOCYTES NFR BLD: 20.7 % (ref 18–48)
MCH RBC QN AUTO: 27.6 PG (ref 27–31)
MCHC RBC AUTO-ENTMCNC: 31.1 G/DL (ref 32–36)
MCV RBC AUTO: 89 FL (ref 82–98)
MONOCYTES # BLD AUTO: 0.5 K/UL (ref 0.3–1)
MONOCYTES NFR BLD: 5.3 % (ref 4–15)
NEUTROPHILS # BLD AUTO: 6.4 K/UL (ref 1.8–7.7)
NEUTROPHILS NFR BLD: 73 % (ref 38–73)
NRBC BLD-RTO: 0 /100 WBC
OHS QRS DURATION: 82 MS
OHS QRS DURATION: 82 MS
OHS QRS DURATION: 86 MS
OHS QTC CALCULATION: 427 MS
OHS QTC CALCULATION: 439 MS
OHS QTC CALCULATION: 442 MS
PCO2 BLDA: 36.7 MMHG (ref 35–45)
PCO2 BLDA: 53.6 MMHG (ref 35–45)
PH SMN: 7.28 [PH] (ref 7.35–7.45)
PH SMN: 7.39 [PH] (ref 7.35–7.45)
PLATELET # BLD AUTO: 228 K/UL (ref 150–450)
PMV BLD AUTO: 10.8 FL (ref 9.2–12.9)
PO2 BLDA: 32 MMHG (ref 40–60)
PO2 BLDA: 46 MMHG (ref 40–60)
POC BE: -1 MMOL/L
POC BE: -3 MMOL/L
POC SATURATED O2: 54 % (ref 95–100)
POC SATURATED O2: 82 % (ref 95–100)
POC TCO2: 23 MMOL/L (ref 24–29)
POC TCO2: 27 MMOL/L (ref 24–29)
POTASSIUM SERPL-SCNC: 3.9 MMOL/L (ref 3.5–5.1)
PROT SERPL-MCNC: 8.1 G/DL (ref 6–8.4)
RBC # BLD AUTO: 4.21 M/UL (ref 4–5.4)
SAMPLE: ABNORMAL
SAMPLE: NORMAL
SARS-COV-2 RDRP RESP QL NAA+PROBE: NEGATIVE
SITE: ABNORMAL
SITE: NORMAL
SODIUM SERPL-SCNC: 139 MMOL/L (ref 136–145)
SPECIMEN SOURCE: ABNORMAL
TROPONIN I SERPL DL<=0.01 NG/ML-MCNC: 7 NG/L (ref 0–14)
TROPONIN I SERPL DL<=0.01 NG/ML-MCNC: 9 NG/L (ref 0–14)
WBC # BLD AUTO: 8.8 K/UL (ref 3.9–12.7)

## 2024-12-30 PROCEDURE — 83880 ASSAY OF NATRIURETIC PEPTIDE: CPT

## 2024-12-30 PROCEDURE — 80053 COMPREHEN METABOLIC PANEL: CPT

## 2024-12-30 PROCEDURE — 82803 BLOOD GASES ANY COMBINATION: CPT

## 2024-12-30 PROCEDURE — 63600175 PHARM REV CODE 636 W HCPCS

## 2024-12-30 PROCEDURE — 84484 ASSAY OF TROPONIN QUANT: CPT

## 2024-12-30 PROCEDURE — 87635 SARS-COV-2 COVID-19 AMP PRB: CPT

## 2024-12-30 PROCEDURE — 93005 ELECTROCARDIOGRAM TRACING: CPT

## 2024-12-30 PROCEDURE — 93010 ELECTROCARDIOGRAM REPORT: CPT | Mod: ,,, | Performed by: INTERNAL MEDICINE

## 2024-12-30 PROCEDURE — 25000003 PHARM REV CODE 250

## 2024-12-30 PROCEDURE — 87502 INFLUENZA DNA AMP PROBE: CPT | Performed by: EMERGENCY MEDICINE

## 2024-12-30 PROCEDURE — 94640 AIRWAY INHALATION TREATMENT: CPT

## 2024-12-30 PROCEDURE — 96361 HYDRATE IV INFUSION ADD-ON: CPT

## 2024-12-30 PROCEDURE — 85025 COMPLETE CBC W/AUTO DIFF WBC: CPT

## 2024-12-30 PROCEDURE — 96374 THER/PROPH/DIAG INJ IV PUSH: CPT

## 2024-12-30 PROCEDURE — 99285 EMERGENCY DEPT VISIT HI MDM: CPT | Mod: 25

## 2024-12-30 PROCEDURE — 87040 BLOOD CULTURE FOR BACTERIA: CPT

## 2024-12-30 PROCEDURE — 83605 ASSAY OF LACTIC ACID: CPT

## 2024-12-30 PROCEDURE — 94761 N-INVAS EAR/PLS OXIMETRY MLT: CPT | Mod: XB

## 2024-12-30 PROCEDURE — 99900035 HC TECH TIME PER 15 MIN (STAT)

## 2024-12-30 PROCEDURE — 25000242 PHARM REV CODE 250 ALT 637 W/ HCPCS

## 2024-12-30 RX ORDER — NITROGLYCERIN 0.4 MG/1
0.4 TABLET SUBLINGUAL EVERY 5 MIN PRN
Status: DISCONTINUED | OUTPATIENT
Start: 2024-12-30 | End: 2024-12-30

## 2024-12-30 RX ORDER — ONDANSETRON HYDROCHLORIDE 2 MG/ML
4 INJECTION, SOLUTION INTRAVENOUS
Status: COMPLETED | OUTPATIENT
Start: 2024-12-30 | End: 2024-12-30

## 2024-12-30 RX ORDER — ACETAMINOPHEN 325 MG/1
650 TABLET ORAL
Status: COMPLETED | OUTPATIENT
Start: 2024-12-30 | End: 2024-12-30

## 2024-12-30 RX ORDER — IPRATROPIUM BROMIDE AND ALBUTEROL SULFATE 2.5; .5 MG/3ML; MG/3ML
3 SOLUTION RESPIRATORY (INHALATION)
Status: COMPLETED | OUTPATIENT
Start: 2024-12-30 | End: 2024-12-30

## 2024-12-30 RX ADMIN — ONDANSETRON 4 MG: 2 INJECTION INTRAMUSCULAR; INTRAVENOUS at 08:12

## 2024-12-30 RX ADMIN — ACETAMINOPHEN 650 MG: 325 TABLET ORAL at 08:12

## 2024-12-30 RX ADMIN — IPRATROPIUM BROMIDE AND ALBUTEROL SULFATE 3 ML: 2.5; .5 SOLUTION RESPIRATORY (INHALATION) at 10:12

## 2024-12-30 RX ADMIN — SODIUM CHLORIDE 1000 ML: 9 INJECTION, SOLUTION INTRAVENOUS at 10:12

## 2024-12-30 NOTE — ED NOTES
Received report and assumed care of patient at this time. Pt presents to ED w/ c/o cp let anterior chest.  Patient is AAOx4 with a calm affect and aware of environment. Airway is open and patent, respirations are spontaneous, normal effort and rate noted. Pt denies chest pain at this time. Radial pulses +2 bilat. Skin warm and dry. Movement to all extremities noted. Bed placed in low position, side rails up x 2, call light is within reach of patient. Explanation of care provided to patient, pt placed on BP, pulse-ox and cardiac monitoring. Patient offers no complaints at this time. Awaiting further MD orders and bed assignment, POC continues.

## 2024-12-30 NOTE — DISCHARGE INSTRUCTIONS
Please return to the ED if you have worsening shortness of breath or develop chest pain.    You were found to have flu today.

## 2024-12-30 NOTE — ED PROVIDER NOTES
Encounter Date: 12/30/2024       History     Chief Complaint   Patient presents with    Chest Pain     Pt c/o chest pain and body aches.  (+) cough. (+) SOB.  Hx CHF     HPI  69-year-old female presents to the emergency department for 2 days of worsening cough with chief complaint of chest pain that started last night.  Denies nausea vomiting diarrhea.  Denies recent antibiotic use.  Denies hemoptysis hematemesis blood in stool or melena.  She states she feels like she might have had a fever at home.  Chest pain is worse when she coughs.  Patient is denying bilateral lower extremity pain to me.  Endorsing shortness of breath.    She is not having productive sputum today.        Denies trauma.  Review of patient's allergies indicates:   Allergen Reactions    Codeine Nausea And Vomiting    Lisinopril      Past Medical History:   Diagnosis Date    Arthritis     Cataract     Diabetes mellitus     Hyperlipidemia     Hypertension     Pre-diabetes     Sciatica      Past Surgical History:   Procedure Laterality Date    CORONARY STENT PLACEMENT      2 stents    HYSTERECTOMY      LEG SURGERY      TONSILLECTOMY       Family History   Problem Relation Name Age of Onset    Diabetes Mother      Glaucoma Mother      No Known Problems Father      No Known Problems Sister      No Known Problems Brother      No Known Problems Maternal Aunt      No Known Problems Maternal Uncle      No Known Problems Paternal Aunt      No Known Problems Paternal Uncle      No Known Problems Maternal Grandmother      No Known Problems Maternal Grandfather      No Known Problems Paternal Grandmother      No Known Problems Paternal Grandfather      Amblyopia Neg Hx      Blindness Neg Hx      Cancer Neg Hx      Cataracts Neg Hx      Hypertension Neg Hx      Macular degeneration Neg Hx      Retinal detachment Neg Hx      Strabismus Neg Hx      Stroke Neg Hx      Thyroid disease Neg Hx       Social History     Tobacco Use    Smoking status: Never     Smokeless tobacco: Never   Substance Use Topics    Alcohol use: No    Drug use: Never     Review of Systems    Physical Exam     Initial Vitals   BP Pulse Resp Temp SpO2   12/30/24 0703 12/30/24 0703 12/30/24 0703 12/30/24 0846 12/30/24 0703   (!) 121/56 97 20 (!) 101.4 °F (38.6 °C) 97 %      MAP       --                Physical Exam    Nursing note and vitals reviewed.  Constitutional: She appears well-developed and well-nourished.   HENT:   Head: Normocephalic and atraumatic.   Neck: Neck supple. No tracheal deviation present. No JVD present.   Cardiovascular:  Normal heart sounds and intact distal pulses.     Exam reveals no gallop and no friction rub.       No murmur heard.  Pulmonary/Chest: No stridor. No respiratory distress. She has wheezes. She has no rhonchi. She has no rales. She exhibits no tenderness.   Abdominal: Abdomen is soft. She exhibits no distension and no mass. There is no abdominal tenderness. There is no rebound and no guarding.   Musculoskeletal:      Cervical back: Neck supple.     Lymphadenopathy:     She has no cervical adenopathy.   Neurological: She is alert and oriented to person, place, and time. GCS score is 15.   Skin: Skin is warm and dry. Capillary refill takes less than 2 seconds.   Psychiatric: She has a normal mood and affect. Her behavior is normal. Judgment and thought content normal.         ED Course   Procedures  Labs Reviewed   INFLUENZA A & B BY MOLECULAR - Abnormal       Result Value    Influenza A, Molecular Positive (*)     Influenza B, Molecular Negative      Flu A & B Source NP     CBC W/ AUTO DIFFERENTIAL - Abnormal    WBC 8.80      RBC 4.21      Hemoglobin 11.6 (*)     Hematocrit 37.3      MCV 89      MCH 27.6      MCHC 31.1 (*)     RDW 14.3      Platelets 228      MPV 10.8      Immature Granulocytes 0.5      Gran # (ANC) 6.4      Immature Grans (Abs) 0.04      Lymph # 1.8      Mono # 0.5      Eos # 0.0      Baso # 0.02      nRBC 0      Gran % 73.0      Lymph %  20.7      Mono % 5.3      Eosinophil % 0.3      Basophil % 0.2      Differential Method Automated     COMPREHENSIVE METABOLIC PANEL - Abnormal    Sodium 139      Potassium 3.9      Chloride 103      CO2 24      Glucose 109      BUN 19      Creatinine 1.4      Calcium 9.9      Total Protein 8.1      Albumin 4.0      Total Bilirubin 0.5      Alkaline Phosphatase 64      AST 17      ALT 21      eGFR 40.7 (*)     Anion Gap 12     B-TYPE NATRIURETIC PEPTIDE - Abnormal     (*)    ISTAT LACTATE - Abnormal    POC Lactate 2.75 (*)     Sample VENOUS      Site Other      Allens Test N/A     ISTAT PROCEDURE - Abnormal    POC PH 7.281 (*)     POC PCO2 53.6 (*)     POC PO2 32 (*)     POC HCO3 25.2      POC BE -1      POC SATURATED O2 54      POC TCO2 27      Sample VENOUS      Site Other      Allens Test N/A     ISTAT PROCEDURE - Abnormal    POC PH 7.391      POC PCO2 36.7      POC PO2 46      POC HCO3 22.3 (*)     POC BE -3 (*)     POC SATURATED O2 82      POC TCO2 23 (*)     Sample VENOUS      Site [place]/UAC      Allens Test N/A     CULTURE, BLOOD   CULTURE, BLOOD   TROPONIN I HIGH SENSITIVITY    Troponin I High Sensitivity 9     TROPONIN I HIGH SENSITIVITY    Troponin I High Sensitivity 7     SARS-COV-2 RNA AMPLIFICATION, QUAL    SARS-CoV-2 RNA, Amplification, Qual Negative     ISTAT LACTATE    POC Lactate 0.82      Sample VENOUS      Site Desmond/UAC      Allens Test N/A          ECG Results              EKG 12-lead (Final result)        Collection Time Result Time QRS Duration [facility] QTC Calculation    12/30/24 08:56:27 12/30/24 13:43:45 86 442                     Final result by Interface, Lab In [facility] (12/30/24 13:43:49)                   Narrative:    Test Reason : Z13.6,    Vent. Rate :  92 BPM     Atrial Rate :  92 BPM     P-R Int : 184 ms          QRS Dur :  86 ms      QT Int : 358 ms       P-R-T Axes :  76   2  58 degrees    QTcB Int : 442 ms    Sinus rhythm with frequent Premature ventricular complexes  Otherwise  normal ECG  When compared with ECG of 30-Dec-2024 07:58,  No significant change was found  Confirmed by Yossi Urban ([id number]) on 12/30/2024 1:43:42 PM    Referred By: AAAREFERRAL SELF           Confirmed By: Yossi Urban                                     EKG 12-lead (Final result)        Collection Time Result Time QRS Duration OHS QTC Calculation    12/30/24 07:58:00 12/30/24 13:51:58 82 427                     Final result by Interface, Lab In [facility] (12/30/24 13:52:05)                   Narrative:    Test Reason : R07.9,    Vent. Rate :  94 BPM     Atrial Rate :  94 BPM     P-R Int : 182 ms          QRS Dur :  82 ms      QT Int : 342 ms       P-R-T Axes :  77   2  60 degrees    QTcB Int : 427 ms    Sinus rhythm with occasional Premature ventricular complexes  Otherwise normal ECG  When compared with ECG of 30-Dec-2024 07:04,  No significant change was found  Confirmed by Jose Vidal ([id number]) on 12/30/2024 1:51:54 PM    Referred By: AAAREFERRAL SELF           Confirmed By: Jose Vidal                                     EKG 12-lead (Final result)        Collection Time Result Time QRS Duration OHS QTC Calculation    12/30/24 07:04:27 12/30/24 13:49:34 82 439                     Final result by Interface, Lab In [facility] (12/30/24 13:49:44)                   Narrative:    Test Reason : R07.9,    Vent. Rate :  98 BPM     Atrial Rate :  98 BPM     P-R Int : 168 ms          QRS Dur :  82 ms      QT Int : 344 ms       P-R-T Axes :  72   0  62 degrees    QTcB Int : 439 ms    Sinus rhythm with occasional Premature ventricular complexes  Otherwise normal ECG  When compared with ECG of 21-Nov-2024 10:12,  Premature ventricular complexes are now Present  Confirmed by Jose Vidal ([id number]) on 12/30/2024 1:49:33 PM    Referred By: AAAREFERRAL SELF           Confirmed By: Jose Vidal                                  Imaging Results              X-Ray Chest AP Portable (Final result)  Result time 12/30/24 10:32:41      Final result  by Benji Carrasquillo MD (12/30/24 10:32:41)                   Impression:      See above      Electronically signed by: Benji Carrasquillo MD  Date:    12/30/2024  Time:    10:32               Narrative:    EXAMINATION:  XR CHEST AP PORTABLE    CLINICAL HISTORY:  Chest Pain;    TECHNIQUE:  Single frontal view of the chest was performed.    COMPARISON:  N 11/21/2024 one    FINDINGS:  Heart size normal.  The lungs are clear.  No pleural effusion                                       Medications   ondansetron injection 4 mg (4 mg Intravenous Given 12/30/24 0832)   acetaminophen tablet 650 mg (650 mg Oral Given 12/30/24 0831)   sodium chloride 0.9% bolus 1,000 mL 1,000 mL (0 mLs Intravenous Stopped 12/30/24 1245)   albuterol-ipratropium 2.5 mg-0.5 mg/3 mL nebulizer solution 3 mL (3 mLs Nebulization Given 12/30/24 1037)     Medical Decision Making  Amount and/or Complexity of Data Reviewed  External Data Reviewed: radiology.     Details: === Results for orders placed during the hospital encounter of 04/21/24 ===    Echo    - Interpretation Summary -  ·  Left Ventricle: The left ventricle is normal in size. Ventricular mass is normal. Normal wall thickness. There is concentric remodeling. Normal wall motion. There is normal systolic function with a visually estimated ejection fraction of 65 - 70%. There is normal diastolic function.  ·  Right Ventricle: Normal right ventricular cavity size. Wall thickness is normal. Right ventricle wall motion  is normal. Systolic function is normal.  ·  IVC/SVC: Normal venous pressure at 3 mmHg.      Labs: ordered.  Radiology: ordered.    Risk  OTC drugs.  Prescription drug management.                                69-year-old female presents with cough and shortness of breath.    Differential diagnosis for this patient includes but isn't limited to flu, COVID, pneumonia, given her chest pain we also considered ACS, though this is lower on the differential.  EKG independently interpreted  without acute findings.  Chest pain seems more pleuritic in nature, mildly elevated BNP however patient does not have signs of clinical fluid overload.  Influenza test is positive.  Patient did have an initial hypercapnia on VBG, patient was given DuoNeb with repeat VBG improving.  Shared decision making, patient would like to return home today however understands that should her breathing worsen that she will come back to the emergency department.    Patient feels that she is able to come back to the emergency department should her breathing change.    Negative lactate makes sepsis less likely.  Patient additionally is normotensive and not tachycardic.    Negative troponin x2 without EKG changes makes ACS less likely.    Chest x-ray independently interpreted without acute findings.    EKG independently interpreted no STEMI, normal sinus rhythm, good QRS progression in precordial leads no new T-wave inversion when compared with the study from November 21, 2024.    We considered Tamiflu however patient is outside of the 48 hour window and this is unlikely to heal clinical benefit.    After visit summary printed and given to patient, patient verbalizes understanding of return precautions, patient was offered admission however does not want to be admitted, shared decision-making as above.  Patient will return to ED should her symptoms worsen.       Clinical Impression:  Final diagnoses:  [R07.9] Chest pain  [Z13.6] Screening for cardiovascular condition  [J11.1] Influenza (Primary)  [R06.89] Hypercapnia          ED Disposition Condition    Discharge Stable          ED Prescriptions    None       Follow-up Information       Follow up With Specialties Details Why Contact Info Additional Information    [facility]   [address]  [facility] [id number] [phone] [facility] Please park in surface lot and check in at central  registration desk             Boris Corona MD  Resident  12/30/24 [id number]

## 2024-12-30 NOTE — ED NOTES
Pt brought pillows for comfort and educated on need for urine sample. Assisted pt with repositioning in bed.

## 2024-12-30 NOTE — ED TRIAGE NOTES
"Christie Damian, a 69 y.o. female presents to the ED w/ complaint of chest pain. Pt arrived to the ED with her daughter via their personal vehicle with complaints of chest pain and shortness of breath. Pt stated that she started feeling bad a few days ago and that her symptoms have gotten progressively worse since then. Pt endorses 8/10 mid-left sternal chest pain, stating she feels a "sticking pain".  Pt also endorses cough, back, and lower bilateral leg pain. Pt stated that her granddaughter was visiting and she had similar symptoms. Pt has hx of stents.    Triage note:  Chief Complaint   Patient presents with    Chest Pain     Pt c/o chest pain and body aches.  (+) cough. (+) SOB.  Hx CHF     Review of patient's allergies indicates:   Allergen Reactions    Codeine Nausea And Vomiting    Lisinopril      Past Medical History:   Diagnosis Date    Arthritis     Cataract     Diabetes mellitus     Hyperlipidemia     Hypertension     Pre-diabetes     Sciatica      "

## 2025-01-04 LAB
BACTERIA BLD CULT: NORMAL
BACTERIA BLD CULT: NORMAL

## 2025-01-04 NOTE — ED PROVIDER NOTES
" Encounter Date: 12/24/2020       History     Chief Complaint   Patient presents with    Leg Pain     left calf pain and swelling to right calf x 1 week. sent to ED to r/o DVT     Patient is a very pleasant 65-year-old  female with a past history of DM to, hyperlipidemia, hypertension presents ED with complaint of right calf pain.  Patient reports onset of right calf pain and swelling beginning approximately on the 14th of December of this year.  Patient denies any previous trauma.  Patient denies any fever, chills, nausea, vomiting, shortness of breath or chest pain when asked.  Furthermore, patient denies any history of coagulopathy, DVT or thromboembolism.  Patient is not currently on blood thinners but she does take aspirin daily.  Patient does report being on her feet "a lot." Patient has take cyclobenzaprine for the pain, but has tried any other intervention.        Review of patient's allergies indicates:   Allergen Reactions    Codeine Nausea And Vomiting    Lisinopril      Past Medical History:   Diagnosis Date    Arthritis     Cataract     Diabetes mellitus     Hyperlipidemia     Hypertension     Pre-diabetes     Sciatica      Past Surgical History:   Procedure Laterality Date    HYSTERECTOMY      LEG SURGERY      TONSILLECTOMY       Family History   Problem Relation Age of Onset    Diabetes Mother     Glaucoma Mother     No Known Problems Father     No Known Problems Sister     No Known Problems Brother     No Known Problems Maternal Aunt     No Known Problems Maternal Uncle     No Known Problems Paternal Aunt     No Known Problems Paternal Uncle     No Known Problems Maternal Grandmother     No Known Problems Maternal Grandfather     No Known Problems Paternal Grandmother     No Known Problems Paternal Grandfather     Amblyopia Neg Hx     Blindness Neg Hx     Cancer Neg Hx     Cataracts Neg Hx     Hypertension Neg Hx     Macular degeneration Neg Hx     " Retinal detachment Neg Hx     Strabismus Neg Hx     Stroke Neg Hx     Thyroid disease Neg Hx      Social History     Tobacco Use    Smoking status: Never Smoker    Smokeless tobacco: Never Used   Substance Use Topics    Alcohol use: No    Drug use: Never     Review of Systems   Constitutional: Negative for fever.   Respiratory: Negative for cough, choking, chest tightness and shortness of breath.    Cardiovascular: Positive for leg swelling. Negative for chest pain and palpitations.   Gastrointestinal: Negative for abdominal pain, constipation, nausea and vomiting.   Musculoskeletal: Negative for arthralgias and joint swelling.   Skin: Negative for pallor and rash.   Neurological: Negative for dizziness and headaches.   Hematological: Negative for adenopathy.   Psychiatric/Behavioral: Negative for agitation and confusion.       Physical Exam     Initial Vitals [12/24/20 1201]   BP Pulse Resp Temp SpO2   (!) 185/82 71 18 97.9 °F (36.6 °C) 99 %      MAP       --         Physical Exam    Nursing note and vitals reviewed.  Constitutional: She appears well-developed and well-nourished. She is not diaphoretic. No distress.   HENT:   Head: Normocephalic and atraumatic.   Eyes: EOM are normal. Pupils are equal, round, and reactive to light.   Neck: Normal range of motion.   Cardiovascular: Normal rate, regular rhythm, normal heart sounds and intact distal pulses.   Pulmonary/Chest: Breath sounds normal.   Abdominal: Soft. Bowel sounds are normal.   Musculoskeletal: Tenderness present. No edema.      Comments: Tenderness to R gastrocnemius muscle region. No no overlying erythema, palpable cords; the right lower extremities well perfused; distal pulses 2+; strength is within normal limits; sensation within normal limits; anterior and posterior muscle compartments of the RLE are compressible.    Neurological: She is alert and oriented to person, place, and time. She has normal strength. GCS score is 15. GCS eye subscore  is 4. GCS verbal subscore is 5. GCS motor subscore is 6.   No focal neurological deficits.   Strength 5/5 throughout   Sensation grossly in tact.    Psychiatric: She has a normal mood and affect.         ED Course   Procedures  Labs Reviewed - No data to display       Imaging Results          US Lower Extremity Veins Right (Final result)  Result time 12/24/20 13:12:59    Final result by Tomas Catalan MD (12/24/20 13:12:59)                 Impression:      No evidence of deep venous thrombosis in the right lower extremity.      Electronically signed by: Tomas Catalan MD  Date:    12/24/2020  Time:    13:12             Narrative:    EXAMINATION:  US LOWER EXTREMITY VEINS RIGHT    CLINICAL HISTORY:  Pain in right leg    TECHNIQUE:  Duplex and color flow Doppler evaluation and graded compression of the right lower extremity veins was performed.    COMPARISON:  None    FINDINGS:  Duplex and color flow Doppler evaluation does not reveal any evidence of acute venous thrombosis in the common femoral, superficial femoral, greater saphenous, popliteal, peroneal, anterior tibial and posterior tibial veins of the right lower extremity.  There is no reflux to suggest valvular incompetence.  There is mild right lower extremity subcutaneous edema.                                 Medical Decision Making:   Clinical Tests:   Radiological Study: Ordered and Reviewed  ED Management:  - pt VSS; pt in no acute distress; afebrile   - venous ultrasound of RLE negative for DVT per final radiology read  - will prescribe Voltaren cream for right lower extremity pain; patient states that she is on her feet a great deal of time; will recommend rest for the next 24-48 hours; patient given strict return precautions for any new or worsening of symptoms  - No further intervention is indicated at this time after having taken into account the patient's history, physical exam findings, and empirical and objective data obtained during the  patient's emergency department workup.   - The patient is at low risk for an emergent medical condition at this time, and I am of the belief that that it is safe to discharge the patient from the emergency department.   - The patient is instructed to follow up as outpatient as indicated on the discharge paperwork.    - I have discussed the specifics of the workup with the patient and the patient has verbalized understanding of the details of the workup, the diagnosis, the treatment plan, and the need for outpatient follow-up.    - Although the patient has no emergent etiology today this does not preclude the development of an emergent condition so, in addition, I have advised the patient that they can return to the ED and/or activate EMS at any time with worsening of their symptoms, change of their symptoms, or with any other medical complaint.    - The patient remained comfortable and stable during their visit in the ED.    - Discharge and follow-up instructions discussed with the patient who expressed understanding and willingness to comply with my recommendations.  - Results of all emergency department tests  discussed thoroughly with patient; all patient questions answered; pt in agreement with plan  - Pt instructed to follow up with PCP in 2-3 days for recheck of today's complaints  - Pt given strict emergency department return precautions for any new or worsening of symptoms  - Pt discharged from the emergency department in stable condition, in no acute distress                                 Clinical Impression:     ICD-10-CM ICD-9-CM   1. Right leg pain  M79.604 729.5                          ED Disposition Condition    Discharge Stable        ED Prescriptions     Medication Sig Dispense Start Date End Date Auth. Provider    diclofenac sodium (VOLTAREN ARTHRITIS PAIN) 1 % Gel Apply 2 g topically once daily. 1 Tube 12/24/2020  Juan Carlos Seals MD        Follow-up Information     Follow up With Specialties  Details Why Contact Info    [facility]  If symptoms worsen [address]  [facility] [id number] [phone]    Michelle Nieves MD Internal Medicine Schedule an appointment as soon as possible for a visit  Follow up with your primary care physician in the next 2-3 days for recheck of today's complaints. [address] [facility] [id number]  [phone]                                         Juan Carlos Seals MD  12/24/20 1527     No

## 2025-04-18 ENCOUNTER — HOSPITAL ENCOUNTER (OUTPATIENT)
Facility: HOSPITAL | Age: 70
Discharge: HOME | End: 2025-04-19
Attending: STUDENT IN AN ORGANIZED HEALTH CARE EDUCATION/TRAINING PROGRAM | Admitting: STUDENT IN AN ORGANIZED HEALTH CARE EDUCATION/TRAINING PROGRAM
Payer: MEDICARE

## 2025-04-18 DIAGNOSIS — R07.9 CHEST PAIN: ICD-10-CM

## 2025-04-18 DIAGNOSIS — J81.1 PULMONARY EDEMA: Primary | ICD-10-CM

## 2025-04-18 DIAGNOSIS — I50.43 ACUTE ON CHRONIC COMBINED SYSTOLIC AND DIASTOLIC HEART FAILURE: ICD-10-CM

## 2025-04-18 LAB
ABSOLUTE EOSINOPHIL (OHS): 0.07 K/UL
ABSOLUTE MONOCYTE (OHS): 0.5 K/UL (ref 0.3–1)
ABSOLUTE NEUTROPHIL COUNT (OHS): 2.08 K/UL (ref 1.8–7.7)
ALBUMIN SERPL BCP-MCNC: 3.9 G/DL (ref 3.5–5.2)
ALP SERPL-CCNC: 68 UNIT/L (ref 40–150)
ALT SERPL W/O P-5'-P-CCNC: 17 UNIT/L (ref 10–44)
ANION GAP (OHS): 11 MMOL/L (ref 8–16)
AST SERPL-CCNC: 21 UNIT/L (ref 11–45)
BASOPHILS # BLD AUTO: 0.02 K/UL
BASOPHILS NFR BLD AUTO: 0.3 %
BILIRUB SERPL-MCNC: 0.4 MG/DL (ref 0.1–1)
BNP SERPL-MCNC: 12 PG/ML (ref 0–99)
BUN SERPL-MCNC: 12 MG/DL (ref 8–23)
CALCIUM SERPL-MCNC: 9.6 MG/DL (ref 8.7–10.5)
CHLORIDE SERPL-SCNC: 108 MMOL/L (ref 95–110)
CO2 SERPL-SCNC: 18 MMOL/L (ref 23–29)
CREAT SERPL-MCNC: 1.1 MG/DL (ref 0.5–1.4)
ERYTHROCYTE [DISTWIDTH] IN BLOOD BY AUTOMATED COUNT: 14 % (ref 11.5–14.5)
GFR SERPLBLD CREATININE-BSD FMLA CKD-EPI: 55 ML/MIN/1.73/M2
GLUCOSE SERPL-MCNC: 124 MG/DL (ref 70–110)
HCT VFR BLD AUTO: 35 % (ref 37–48.5)
HCV AB SERPL QL IA: NORMAL
HGB BLD-MCNC: 11.1 GM/DL (ref 12–16)
HIV 1+2 AB+HIV1 P24 AG SERPL QL IA: NORMAL
IMM GRANULOCYTES # BLD AUTO: 0.01 K/UL (ref 0–0.04)
IMM GRANULOCYTES NFR BLD AUTO: 0.2 % (ref 0–0.5)
LIPASE SERPL-CCNC: 25 U/L (ref 4–60)
LYMPHOCYTES # BLD AUTO: 3.21 K/UL (ref 1–4.8)
MCH RBC QN AUTO: 26.9 PG (ref 27–31)
MCHC RBC AUTO-ENTMCNC: 31.7 G/DL (ref 32–36)
MCV RBC AUTO: 85 FL (ref 82–98)
NUCLEATED RBC (/100WBC) (OHS): 0 /100 WBC
PLATELET # BLD AUTO: 251 K/UL (ref 150–450)
PMV BLD AUTO: 10.2 FL (ref 9.2–12.9)
POTASSIUM SERPL-SCNC: 4.7 MMOL/L (ref 3.5–5.1)
PROT SERPL-MCNC: 7.6 GM/DL (ref 6–8.4)
RBC # BLD AUTO: 4.13 M/UL (ref 4–5.4)
RELATIVE EOSINOPHIL (OHS): 1.2 %
RELATIVE LYMPHOCYTE (OHS): 54.5 % (ref 18–48)
RELATIVE MONOCYTE (OHS): 8.5 % (ref 4–15)
RELATIVE NEUTROPHIL (OHS): 35.3 % (ref 38–73)
SODIUM SERPL-SCNC: 137 MMOL/L (ref 136–145)
TROPONIN I SERPL HS-MCNC: <3 NG/L
WBC # BLD AUTO: 5.89 K/UL (ref 3.9–12.7)

## 2025-04-18 PROCEDURE — 93010 ELECTROCARDIOGRAM REPORT: CPT | Mod: ,,, | Performed by: INTERNAL MEDICINE

## 2025-04-18 PROCEDURE — 25000003 PHARM REV CODE 250: Performed by: STUDENT IN AN ORGANIZED HEALTH CARE EDUCATION/TRAINING PROGRAM

## 2025-04-18 PROCEDURE — 84484 ASSAY OF TROPONIN QUANT: CPT | Performed by: STUDENT IN AN ORGANIZED HEALTH CARE EDUCATION/TRAINING PROGRAM

## 2025-04-18 PROCEDURE — 96374 THER/PROPH/DIAG INJ IV PUSH: CPT

## 2025-04-18 PROCEDURE — 87389 HIV-1 AG W/HIV-1&-2 AB AG IA: CPT | Performed by: PHYSICIAN ASSISTANT

## 2025-04-18 PROCEDURE — 84484 ASSAY OF TROPONIN QUANT: CPT | Mod: 91 | Performed by: PHYSICIAN ASSISTANT

## 2025-04-18 PROCEDURE — 63600175 PHARM REV CODE 636 W HCPCS: Performed by: STUDENT IN AN ORGANIZED HEALTH CARE EDUCATION/TRAINING PROGRAM

## 2025-04-18 PROCEDURE — 85025 COMPLETE CBC W/AUTO DIFF WBC: CPT | Performed by: STUDENT IN AN ORGANIZED HEALTH CARE EDUCATION/TRAINING PROGRAM

## 2025-04-18 PROCEDURE — 83880 ASSAY OF NATRIURETIC PEPTIDE: CPT | Performed by: STUDENT IN AN ORGANIZED HEALTH CARE EDUCATION/TRAINING PROGRAM

## 2025-04-18 PROCEDURE — 80053 COMPREHEN METABOLIC PANEL: CPT | Performed by: STUDENT IN AN ORGANIZED HEALTH CARE EDUCATION/TRAINING PROGRAM

## 2025-04-18 PROCEDURE — 86803 HEPATITIS C AB TEST: CPT | Performed by: PHYSICIAN ASSISTANT

## 2025-04-18 PROCEDURE — 99285 EMERGENCY DEPT VISIT HI MDM: CPT | Mod: 25

## 2025-04-18 PROCEDURE — G0378 HOSPITAL OBSERVATION PER HR: HCPCS

## 2025-04-18 PROCEDURE — 83690 ASSAY OF LIPASE: CPT | Performed by: STUDENT IN AN ORGANIZED HEALTH CARE EDUCATION/TRAINING PROGRAM

## 2025-04-18 PROCEDURE — 25500020 PHARM REV CODE 255: Performed by: STUDENT IN AN ORGANIZED HEALTH CARE EDUCATION/TRAINING PROGRAM

## 2025-04-18 PROCEDURE — 93005 ELECTROCARDIOGRAM TRACING: CPT

## 2025-04-18 RX ORDER — IBUPROFEN 200 MG
24 TABLET ORAL
Status: DISCONTINUED | OUTPATIENT
Start: 2025-04-18 | End: 2025-04-19 | Stop reason: HOSPADM

## 2025-04-18 RX ORDER — ENOXAPARIN SODIUM 100 MG/ML
40 INJECTION SUBCUTANEOUS EVERY 24 HOURS
Status: DISCONTINUED | OUTPATIENT
Start: 2025-04-19 | End: 2025-04-19 | Stop reason: HOSPADM

## 2025-04-18 RX ORDER — ACETAMINOPHEN 325 MG/1
650 TABLET ORAL EVERY 4 HOURS PRN
Status: DISCONTINUED | OUTPATIENT
Start: 2025-04-18 | End: 2025-04-19 | Stop reason: HOSPADM

## 2025-04-18 RX ORDER — ONDANSETRON 8 MG/1
8 TABLET, ORALLY DISINTEGRATING ORAL EVERY 8 HOURS PRN
Status: DISCONTINUED | OUTPATIENT
Start: 2025-04-18 | End: 2025-04-19 | Stop reason: HOSPADM

## 2025-04-18 RX ORDER — TALC
6 POWDER (GRAM) TOPICAL NIGHTLY PRN
Status: DISCONTINUED | OUTPATIENT
Start: 2025-04-18 | End: 2025-04-19 | Stop reason: HOSPADM

## 2025-04-18 RX ORDER — FUROSEMIDE 10 MG/ML
40 INJECTION INTRAMUSCULAR; INTRAVENOUS
Status: COMPLETED | OUTPATIENT
Start: 2025-04-18 | End: 2025-04-18

## 2025-04-18 RX ORDER — INSULIN ASPART 100 [IU]/ML
0-5 INJECTION, SOLUTION INTRAVENOUS; SUBCUTANEOUS
Status: DISCONTINUED | OUTPATIENT
Start: 2025-04-18 | End: 2025-04-19 | Stop reason: HOSPADM

## 2025-04-18 RX ORDER — POLYETHYLENE GLYCOL 3350 17 G/17G
17 POWDER, FOR SOLUTION ORAL DAILY PRN
Status: DISCONTINUED | OUTPATIENT
Start: 2025-04-18 | End: 2025-04-19 | Stop reason: HOSPADM

## 2025-04-18 RX ORDER — GLUCAGON 1 MG
1 KIT INJECTION
Status: DISCONTINUED | OUTPATIENT
Start: 2025-04-18 | End: 2025-04-19 | Stop reason: HOSPADM

## 2025-04-18 RX ORDER — ASPIRIN 325 MG
325 TABLET ORAL
Status: COMPLETED | OUTPATIENT
Start: 2025-04-18 | End: 2025-04-18

## 2025-04-18 RX ORDER — IBUPROFEN 200 MG
16 TABLET ORAL
Status: DISCONTINUED | OUTPATIENT
Start: 2025-04-18 | End: 2025-04-19 | Stop reason: HOSPADM

## 2025-04-18 RX ADMIN — FUROSEMIDE 40 MG: 10 INJECTION, SOLUTION INTRAVENOUS at 10:04

## 2025-04-18 RX ADMIN — IOHEXOL 100 ML: 350 INJECTION, SOLUTION INTRAVENOUS at 05:04

## 2025-04-18 RX ADMIN — ASPIRIN 325 MG: 325 TABLET ORAL at 04:04

## 2025-04-18 NOTE — ED PROVIDER NOTES
Encounter Date: 4/18/2025       History     Chief Complaint   Patient presents with    Chest Pain     Constant mid sternal chest pain x 1 week with lower back pain, +SOB and nausea, denies emesis, diarrhea, hx stents      HPI    68 y/o F PMH DM, HTN, HPL, CAD who presents to the ED for evaluation of chest pain.  Patient states that she's had worsening substernal chest pain for the last one week or so. Associated with dyspnea, mild nausea.  She's had stents in the past.  No inciting events that she is aware of.  Denies any f/c, syncope, headache, neck pain, N/V/D, abd pain, or falls.    Review of patient's allergies indicates:   Allergen Reactions    Codeine Nausea And Vomiting    Lisinopril      Past Medical History:   Diagnosis Date    Arthritis     Cataract     Diabetes mellitus     Hyperlipidemia     Hypertension     Pre-diabetes     Sciatica      Past Surgical History:   Procedure Laterality Date    CORONARY STENT PLACEMENT      2 stents    HYSTERECTOMY      LEG SURGERY      TONSILLECTOMY       Family History   Problem Relation Name Age of Onset    Diabetes Mother      Glaucoma Mother      No Known Problems Father      No Known Problems Sister      No Known Problems Brother      No Known Problems Maternal Aunt      No Known Problems Maternal Uncle      No Known Problems Paternal Aunt      No Known Problems Paternal Uncle      No Known Problems Maternal Grandmother      No Known Problems Maternal Grandfather      No Known Problems Paternal Grandmother      No Known Problems Paternal Grandfather      Amblyopia Neg Hx      Blindness Neg Hx      Cancer Neg Hx      Cataracts Neg Hx      Hypertension Neg Hx      Macular degeneration Neg Hx      Retinal detachment Neg Hx      Strabismus Neg Hx      Stroke Neg Hx      Thyroid disease Neg Hx       Social History[1]  Review of Systems   Cardiovascular:  Positive for chest pain.       Physical Exam     Initial Vitals [04/18/25 1457]   BP Pulse Resp Temp SpO2   126/60 77 18  98.2 °F (36.8 °C) 96 %      MAP       --         Physical Exam    Nursing note and vitals reviewed.  Constitutional: She appears well-nourished.   HENT:   Head: Atraumatic.   Eyes: EOM are normal.   Neck: Neck supple.   Cardiovascular:  Normal rate and regular rhythm.           Pulmonary/Chest: Breath sounds normal. No respiratory distress.   Musculoskeletal:         General: No edema. Normal range of motion.      Cervical back: Neck supple.     Neurological: She is alert and oriented to person, place, and time.   Skin: Skin is warm and dry.   Psychiatric: She has a normal mood and affect. Thought content normal.         ED Course   Procedures  Labs Reviewed   COMPREHENSIVE METABOLIC PANEL - Abnormal       Result Value    Sodium 137      Potassium 4.7      Chloride 108      CO2 18 (*)     Glucose 124 (*)     BUN 12      Creatinine 1.1      Calcium 9.6      Protein Total 7.6      Albumin 3.9      Bilirubin Total 0.4      ALP 68      AST 21      ALT 17      Anion Gap 11      eGFR 55 (*)    CBC WITH DIFFERENTIAL - Abnormal    WBC 5.89      RBC 4.13      HGB 11.1 (*)     HCT 35.0 (*)     MCV 85      MCH 26.9 (*)     MCHC 31.7 (*)     RDW 14.0      Platelet Count 251      MPV 10.2      Nucleated RBC 0      Neut % 35.3 (*)     Lymph % 54.5 (*)     Mono % 8.5      Eos % 1.2      Basophil % 0.3      Imm Grans % 0.2      Neut # 2.08      Lymph # 3.21      Mono # 0.50      Eos # 0.07      Baso # 0.02      Imm Grans # 0.01     HEPATITIS C ANTIBODY - Normal    Hep C Ab Interp Non-Reactive     HIV 1 / 2 ANTIBODY - Normal    HIV 1/2 Ag/Ab Non-Reactive     TROPONIN I HIGH SENSITIVITY - Normal    Troponin High Sensitive <3     B-TYPE NATRIURETIC PEPTIDE - Normal    BNP 12     LIPASE - Normal    Lipase Level 25     TROPONIN I HIGH SENSITIVITY - Normal    Troponin High Sensitive <3     TROPONIN I HIGH SENSITIVITY - Normal    Troponin High Sensitive <3     CBC W/ AUTO DIFFERENTIAL    Narrative:     The following orders were created  for panel order CBC auto differential.  Procedure                               Abnormality         Status                     ---------                               -----------         ------                     CBC with Differential[[id number]]       Abnormal            Final result                 Please view results for these tests on the individual orders.   HEP C VIRUS HOLD SPECIMEN          Imaging Results              CTA Chest Non-Coronary (PE Studies) (Final result)  Result time 04/18/25 21:20:44      Final result by Brandon Meza MD (04/18/25 21:20:44)                   Impression:      No evidence of pulmonary embolism to the level of the segmental pulmonary arteries.    Patchy pulmonary ground-glass favored to represent pulmonary edema.    Small hiatal hernia with suspected GERD and/or mild esophageal dysmotility.  Correlate clinically.  Consider outpatient workup such as EGD and/or esophagram.    Scattered atherosclerotic changes, including poorly visualized coronary artery disease.    Allowing for artifacts, there is no scan evidence of aneurysm, dissection, or other acute syndrome involving the thoracic aorta.    Electronically signed by resident: Shubham Reddy  Date:    04/18/2025  Time:    20:16    Electronically signed by: Brandon Meza  Date:    04/18/2025  Time:    21:20               Narrative:    EXAMINATION:  CTA CHEST NON CORONARY (PE STUDIES)    CLINICAL HISTORY:  Pulmonary embolism (PE) suspected, high prob;    TECHNIQUE:  Low dose axial images, sagittal and coronal reformations were obtained from the thoracic inlet to the lung bases following the IV administration of 100 mL of Omnipaque 350.  Contrast timing was optimized to evaluate the pulmonary arteries.    COMPARISON:  Chest radiograph 12/30/2025, CTA chest abdomen pelvis 04/21/2025, CTA chest 07/23/2023    FINDINGS:  Pulmonary vasculature: Satisfactory opacification of the pulmonary arterial system with no filling defect to the  segmental level.  Main pulmonary artery is normal in diameter.    Trace intraluminal gas in the pulmonary trunk, with estimated volume of less than 2 mL.    Aorta: Left-sided aortic arch.  No aneurysm and no significant atherosclerosis    Base of Neck: No significant abnormality.  Thyroid gland appears normal.    Thoracic soft tissues: No lymphadenopathy or soft tissue lesion.    Heart: Mildly enlarged with trace pericardial effusion and mild calcific coronary artery atherosclerosis.    Rosette/Mediastinum: No pathologic marisela enlargement.    Airways: Patent.  Bronchial wall thickening.    Lungs/Pleura: Patchy ground-glass opacities bilaterally without focal consolidation or pleural effusion.  No pneumothorax.  Few punctate calcified granulomas.  Bibasilar linear bandlike opacities suggestive of subsegmental atelectasis versus scarring.    Esophagus: Normal thoracic esophageal course.  Small hiatal hernia.  A small quantity of intraluminal gas and fluid scattered within the thoracic esophagus is nonspecific, with gastroesophageal reflux or esophageal dysmotility among the considerations.    Upper Abdomen: No abnormality of the partially imaged upper abdomen.    Bones: No acute fracture. No suspicious lytic or sclerotic lesions.                                       Medications   enoxaparin injection 40 mg (has no administration in time range)   melatonin tablet 6 mg (has no administration in time range)   ondansetron disintegrating tablet 8 mg (has no administration in time range)   polyethylene glycol packet 17 g (has no administration in time range)   acetaminophen tablet 650 mg (has no administration in time range)   glucose chewable tablet 16 g (has no administration in time range)   glucose chewable tablet 24 g (has no administration in time range)   dextrose 50% injection 12.5 g (has no administration in time range)   dextrose 50% injection 25 g (has no administration in time range)   glucagon (human recombinant)  injection 1 mg (has no administration in time range)   insulin aspart U-100 pen 0-5 Units (has no administration in time range)   amitriptyline tablet 10 mg (has no administration in time range)   aspirin chewable tablet 81 mg (has no administration in time range)   atorvastatin tablet 80 mg (80 mg Oral Given 4/19/25 0037)   gabapentin capsule 300 mg (300 mg Oral Given 4/19/25 0037)   losartan tablet 100 mg (has no administration in time range)   pantoprazole EC tablet 40 mg (has no administration in time range)   spironolactone tablet 50 mg (has no administration in time range)   fluticasone furoate-vilanteroL 100-25 mcg/dose diskus inhaler 1 puff (has no administration in time range)   NIFEdipine 24 hr tablet 30 mg (has no administration in time range)   aspirin tablet 325 mg (325 mg Oral Given 4/18/25 1609)   iohexoL (OMNIPAQUE 350) injection 100 mL (100 mLs Intravenous Given 4/18/25 1743)   furosemide injection 40 mg (40 mg Intravenous Given 4/18/25 2243)   hydrALAZINE tablet 25 mg (25 mg Oral Given 4/19/25 0037)     Medical Decision Making  Amount and/or Complexity of Data Reviewed  Labs: ordered. Decision-making details documented in ED Course.  Radiology: ordered and independent interpretation performed. Decision-making details documented in ED Course.  ECG/medicine tests: ordered and independent interpretation performed. Decision-making details documented in ED Course.    Risk  OTC drugs.  Prescription drug management.                     EKG:  Rate 78  NSR  No STEMI                 70 y/o F here with chest pain and dyspnea.  VSS in ED, exam as above.  EKG no STEMI.  Normal WBC, normal lytes, normal lipase. Trop x2 negative.  Aspirin given.  CTA chest with no acute PE. It does show pulm edema.  Will diurese and admit to [facility].  Patient agreeable with plan.   Admit for pulm edema, chest pain.      Clinical Impression:  Final diagnoses:  [J81.1] Pulmonary edema (Primary)  [I50.43] Acute on chronic combined  systolic and diastolic heart failure          ED Disposition Condition    Observation                     [1]   Social History  Tobacco Use    Smoking status: Never    Smokeless tobacco: Never   Substance Use Topics    Alcohol use: No    Drug use: Never        Nick Torres MD  04/19/25 0049

## 2025-04-18 NOTE — ED NOTES
Patient identifiers for Christie Damian 69 y.o. female checked and correct.  Chief Complaint   Patient presents with    Chest Pain     Constant mid sternal chest pain x 1 week with lower back pain, +SOB and nausea, denies emesis, diarrhea, hx stents      Past Medical History:   Diagnosis Date    Arthritis     Cataract     Diabetes mellitus     Hyperlipidemia     Hypertension     Pre-diabetes     Sciatica      Allergies reported:   Review of patient's allergies indicates:   Allergen Reactions    Codeine Nausea And Vomiting    Lisinopril      Most recent vital signs:  Vitals:    04/18/25 1700   BP: (!) 166/75   Pulse: 77   Resp:    Temp:

## 2025-04-19 VITALS
OXYGEN SATURATION: 94 % | HEIGHT: 60 IN | RESPIRATION RATE: 20 BRPM | BODY MASS INDEX: 42.41 KG/M2 | HEART RATE: 79 BPM | SYSTOLIC BLOOD PRESSURE: 114 MMHG | TEMPERATURE: 98 F | WEIGHT: 216 LBS | DIASTOLIC BLOOD PRESSURE: 72 MMHG

## 2025-04-19 PROBLEM — I50.33 ACUTE ON CHRONIC DIASTOLIC HEART FAILURE: Status: ACTIVE | Noted: 2019-03-18

## 2025-04-19 LAB
ABSOLUTE EOSINOPHIL (OHS): 0.1 K/UL
ABSOLUTE MONOCYTE (OHS): 0.44 K/UL (ref 0.3–1)
ABSOLUTE NEUTROPHIL COUNT (OHS): 2.33 K/UL (ref 1.8–7.7)
ANION GAP (OHS): 10 MMOL/L (ref 8–16)
ASCENDING AORTA: 2.48 CM
AV AREA BY CONTINUOUS VTI: 1.8 CM2
AV INDEX (PROSTH): 0.59
AV LVOT MEAN GRADIENT: 2 MMHG
AV LVOT PEAK GRADIENT: 3 MMHG
AV MEAN GRADIENT: 4 MMHG
AV PEAK GRADIENT: 7 MMHG
AV VALVE AREA BY VELOCITY RATIO: 2.2 CM²
AV VALVE AREA: 1.9 CM2
AV VELOCITY RATIO: 0.69
BASOPHILS # BLD AUTO: 0.03 K/UL
BASOPHILS NFR BLD AUTO: 0.5 %
BSA FOR ECHO PROCEDURE: 2.04 M2
BUN SERPL-MCNC: 16 MG/DL (ref 8–23)
CALCIUM SERPL-MCNC: 11 MG/DL (ref 8.7–10.5)
CHLORIDE SERPL-SCNC: 101 MMOL/L (ref 95–110)
CHOLEST SERPL-MCNC: 140 MG/DL (ref 120–199)
CHOLEST/HDLC SERPL: 2.6 {RATIO} (ref 2–5)
CO2 SERPL-SCNC: 28 MMOL/L (ref 23–29)
CREAT SERPL-MCNC: 1.2 MG/DL (ref 0.5–1.4)
CV ECHO LV RWT: 0.67 CM
DOP CALC AO PEAK VEL: 1.3 M/S
DOP CALC AO VTI: 30.2 CM
DOP CALC LVOT AREA: 3.1 CM2
DOP CALC LVOT DIAMETER: 2 CM
DOP CALC LVOT PEAK VEL: 0.9 M/S
DOP CALC LVOT STROKE VOLUME: 55.9 CM3
DOP CALCLVOT PEAK VEL VTI: 17.8 CM
E WAVE DECELERATION TIME: 209 MS
E/A RATIO: 0.72
E/E' RATIO: 10 M/S
EAG (OHS): 126 MG/DL (ref 68–131)
ECHO EF ESTIMATED: 66 %
ECHO LV POSTERIOR WALL: 1 CM (ref 0.6–1.1)
ERYTHROCYTE [DISTWIDTH] IN BLOOD BY AUTOMATED COUNT: 14.2 % (ref 11.5–14.5)
FRACTIONAL SHORTENING: 33.3 % (ref 28–44)
GFR SERPLBLD CREATININE-BSD FMLA CKD-EPI: 49 ML/MIN/1.73/M2
GLUCOSE SERPL-MCNC: 106 MG/DL (ref 70–110)
HBA1C MFR BLD: 6 % (ref 4–5.6)
HCT VFR BLD AUTO: 41.2 % (ref 37–48.5)
HDLC SERPL-MCNC: 54 MG/DL (ref 40–75)
HDLC SERPL: 38.6 % (ref 20–50)
HGB BLD-MCNC: 12.7 GM/DL (ref 12–16)
IMM GRANULOCYTES # BLD AUTO: 0.02 K/UL (ref 0–0.04)
IMM GRANULOCYTES NFR BLD AUTO: 0.3 % (ref 0–0.5)
INTERVENTRICULAR SEPTUM: 1.1 CM (ref 0.6–1.1)
IVRT: 111 MS
LA MAJOR: 4.6 CM
LA MINOR: 4.8 CM
LA WIDTH: 3.1 CM
LDLC SERPL CALC-MCNC: 51.2 MG/DL (ref 63–159)
LEFT ATRIUM SIZE: 2.9 CM
LEFT ATRIUM VOLUME INDEX MOD: 19 ML/M2
LEFT ATRIUM VOLUME INDEX: 19 ML/M2
LEFT ATRIUM VOLUME MOD: 36 ML
LEFT ATRIUM VOLUME: 36 CM3
LEFT INTERNAL DIMENSION IN SYSTOLE: 2 CM (ref 2.1–4)
LEFT VENTRICLE DIASTOLIC VOLUME INDEX: 18.65 ML/M2
LEFT VENTRICLE DIASTOLIC VOLUME: 36 ML
LEFT VENTRICLE MASS INDEX: 45.9 G/M2
LEFT VENTRICLE MID-CAVITY PEAK GRADIENT REST: 55 MMHG
LEFT VENTRICLE MID-CAVITY PEAK GRADIENT VALSALVA: 52 MMHG
LEFT VENTRICLE SYSTOLIC VOLUME INDEX: 6.2 ML/M2
LEFT VENTRICLE SYSTOLIC VOLUME: 12 ML
LEFT VENTRICULAR INTERNAL DIMENSION IN DIASTOLE: 3 CM (ref 3.5–6)
LEFT VENTRICULAR MASS: 88.5 G
LV LATERAL E/E' RATIO: 9
LV SEPTAL E/E' RATIO: 10.5
LYMPHOCYTES # BLD AUTO: 3.6 K/UL (ref 1–4.8)
MAGNESIUM SERPL-MCNC: 1.9 MG/DL (ref 1.6–2.6)
MCH RBC QN AUTO: 26.1 PG (ref 27–31)
MCHC RBC AUTO-ENTMCNC: 30.8 G/DL (ref 32–36)
MCV RBC AUTO: 85 FL (ref 82–98)
MV PEAK A VEL: 0.88 M/S
MV PEAK E VEL: 0.63 M/S
NONHDLC SERPL-MCNC: 86 MG/DL
NUCLEATED RBC (/100WBC) (OHS): 0 /100 WBC
OHS QRS DURATION: 84 MS
OHS QTC CALCULATION: 387 MS
PHOSPHATE SERPL-MCNC: 4.3 MG/DL (ref 2.7–4.5)
PLATELET # BLD AUTO: 286 K/UL (ref 150–450)
PMV BLD AUTO: 10.1 FL (ref 9.2–12.9)
POTASSIUM SERPL-SCNC: 3.7 MMOL/L (ref 3.5–5.1)
RA MAJOR: 3.63 CM
RA PRESSURE ESTIMATED: 3 MMHG
RA WIDTH: 2.78 CM
RBC # BLD AUTO: 4.86 M/UL (ref 4–5.4)
RELATIVE EOSINOPHIL (OHS): 1.5 %
RELATIVE LYMPHOCYTE (OHS): 55.2 % (ref 18–48)
RELATIVE MONOCYTE (OHS): 6.7 % (ref 4–15)
RELATIVE NEUTROPHIL (OHS): 35.8 % (ref 38–73)
RV TISSUE DOPPLER FREE WALL SYSTOLIC VELOCITY 1 (APICAL 4 CHAMBER VIEW): 15.69 CM/S
SINUS: 2.74 CM
SODIUM SERPL-SCNC: 139 MMOL/L (ref 136–145)
STJ: 2.04 CM
TDI LATERAL: 0.07 M/S
TDI SEPTAL: 0.06 M/S
TDI: 0.07 M/S
TRICUSPID ANNULAR PLANE SYSTOLIC EXCURSION: 1.9 CM
TRIGL SERPL-MCNC: 174 MG/DL (ref 30–150)
WBC # BLD AUTO: 6.52 K/UL (ref 3.9–12.7)
Z-SCORE OF LEFT VENTRICULAR DIMENSION IN END DIASTOLE: -6
Z-SCORE OF LEFT VENTRICULAR DIMENSION IN END SYSTOLE: -4.11

## 2025-04-19 PROCEDURE — 84295 ASSAY OF SERUM SODIUM: CPT | Performed by: PHYSICIAN ASSISTANT

## 2025-04-19 PROCEDURE — 83735 ASSAY OF MAGNESIUM: CPT | Performed by: PHYSICIAN ASSISTANT

## 2025-04-19 PROCEDURE — 84100 ASSAY OF PHOSPHORUS: CPT | Performed by: PHYSICIAN ASSISTANT

## 2025-04-19 PROCEDURE — 85025 COMPLETE CBC W/AUTO DIFF WBC: CPT | Performed by: PHYSICIAN ASSISTANT

## 2025-04-19 PROCEDURE — 84478 ASSAY OF TRIGLYCERIDES: CPT | Performed by: PHYSICIAN ASSISTANT

## 2025-04-19 PROCEDURE — 25000003 PHARM REV CODE 250: Performed by: PHYSICIAN ASSISTANT

## 2025-04-19 PROCEDURE — G0378 HOSPITAL OBSERVATION PER HR: HCPCS

## 2025-04-19 PROCEDURE — 99900035 HC TECH TIME PER 15 MIN (STAT)

## 2025-04-19 PROCEDURE — 82962 GLUCOSE BLOOD TEST: CPT

## 2025-04-19 PROCEDURE — 83036 HEMOGLOBIN GLYCOSYLATED A1C: CPT | Performed by: PHYSICIAN ASSISTANT

## 2025-04-19 RX ORDER — NIFEDIPINE 30 MG/1
1 TABLET, EXTENDED RELEASE ORAL DAILY
COMMUNITY

## 2025-04-19 RX ORDER — NAPROXEN SODIUM 220 MG/1
81 TABLET, FILM COATED ORAL DAILY
Status: DISCONTINUED | OUTPATIENT
Start: 2025-04-19 | End: 2025-04-19 | Stop reason: HOSPADM

## 2025-04-19 RX ORDER — PANTOPRAZOLE SODIUM 40 MG/1
40 TABLET, DELAYED RELEASE ORAL DAILY
Status: DISCONTINUED | OUTPATIENT
Start: 2025-04-19 | End: 2025-04-19 | Stop reason: HOSPADM

## 2025-04-19 RX ORDER — HYDRALAZINE HYDROCHLORIDE 25 MG/1
25 TABLET, FILM COATED ORAL ONCE
Status: COMPLETED | OUTPATIENT
Start: 2025-04-19 | End: 2025-04-19

## 2025-04-19 RX ORDER — FUROSEMIDE 40 MG/1
40 TABLET ORAL 2 TIMES DAILY
Qty: 60 TABLET | Refills: 11 | Status: SHIPPED | OUTPATIENT
Start: 2025-04-19 | End: 2026-04-19

## 2025-04-19 RX ORDER — AMITRIPTYLINE HYDROCHLORIDE 10 MG/1
10 TABLET, FILM COATED ORAL NIGHTLY PRN
Status: DISCONTINUED | OUTPATIENT
Start: 2025-04-19 | End: 2025-04-19 | Stop reason: HOSPADM

## 2025-04-19 RX ORDER — NIFEDIPINE 30 MG/1
30 TABLET, EXTENDED RELEASE ORAL DAILY
Status: DISCONTINUED | OUTPATIENT
Start: 2025-04-19 | End: 2025-04-19 | Stop reason: HOSPADM

## 2025-04-19 RX ORDER — PROPRANOLOL HYDROCHLORIDE 80 MG/1
80 TABLET ORAL 2 TIMES DAILY
Status: DISCONTINUED | OUTPATIENT
Start: 2025-04-19 | End: 2025-04-19

## 2025-04-19 RX ORDER — LOSARTAN POTASSIUM 50 MG/1
100 TABLET ORAL DAILY
Status: DISCONTINUED | OUTPATIENT
Start: 2025-04-19 | End: 2025-04-19 | Stop reason: HOSPADM

## 2025-04-19 RX ORDER — SPIRONOLACTONE 25 MG/1
50 TABLET ORAL DAILY
Status: DISCONTINUED | OUTPATIENT
Start: 2025-04-19 | End: 2025-04-19 | Stop reason: HOSPADM

## 2025-04-19 RX ORDER — GABAPENTIN 300 MG/1
300 CAPSULE ORAL 3 TIMES DAILY
Status: DISCONTINUED | OUTPATIENT
Start: 2025-04-19 | End: 2025-04-19 | Stop reason: HOSPADM

## 2025-04-19 RX ORDER — ATORVASTATIN CALCIUM 40 MG/1
80 TABLET, FILM COATED ORAL NIGHTLY
Status: DISCONTINUED | OUTPATIENT
Start: 2025-04-19 | End: 2025-04-19 | Stop reason: HOSPADM

## 2025-04-19 RX ORDER — FLUTICASONE FUROATE AND VILANTEROL 100; 25 UG/1; UG/1
1 POWDER RESPIRATORY (INHALATION) DAILY
Status: DISCONTINUED | OUTPATIENT
Start: 2025-04-19 | End: 2025-04-19 | Stop reason: HOSPADM

## 2025-04-19 RX ADMIN — NIFEDIPINE 30 MG: 30 TABLET, FILM COATED, EXTENDED RELEASE ORAL at 08:04

## 2025-04-19 RX ADMIN — HYDRALAZINE HYDROCHLORIDE 25 MG: 25 TABLET ORAL at 12:04

## 2025-04-19 RX ADMIN — ASPIRIN 81 MG CHEWABLE TABLET 81 MG: 81 TABLET CHEWABLE at 08:04

## 2025-04-19 RX ADMIN — GABAPENTIN 300 MG: 300 CAPSULE ORAL at 08:04

## 2025-04-19 RX ADMIN — GABAPENTIN 300 MG: 300 CAPSULE ORAL at 12:04

## 2025-04-19 RX ADMIN — SPIRONOLACTONE 50 MG: 50 TABLET ORAL at 08:04

## 2025-04-19 RX ADMIN — LOSARTAN POTASSIUM 100 MG: 50 TABLET, FILM COATED ORAL at 08:04

## 2025-04-19 RX ADMIN — ATORVASTATIN CALCIUM 80 MG: 40 TABLET, FILM COATED ORAL at 12:04

## 2025-04-19 RX ADMIN — PANTOPRAZOLE SODIUM 40 MG: 40 TABLET, DELAYED RELEASE ORAL at 08:04

## 2025-04-19 NOTE — PHARMACY MED REC
"        Admission Medication History     The home medication history was taken by Pili Nino.    You may go to "Admission" then "Reconcile Home Medications" tabs to review and/or act upon these items.     The home medication list has been updated by the Pharmacy department.   Please read ALL comments highlighted in yellow.   Please address this information as you see fit.    Feel free to contact us if you have any questions or require assistance.      The medications listed below were removed from the home medication list. Please reorder if appropriate:  Patient reports no longer taking the following medication(s):  Vitamin c   Vitamin b 12  K-tab   Effient   Inderal     Medications listed below were obtained from: Patient/family and Analytic software- [employer]  Current Outpatient Medications on File Prior to Encounter   Medication Sig    gabapentin (NEURONTIN) 300 MG capsule Take 1 capsule (300 mg total) by mouth 3 (three) times daily.      losartan (COZAAR) 100 MG tablet Take 100 mg by mouth once daily.      metFORMIN (GLUCOPHAGE-XR) 500 MG ER 24hr tablet Take 1,000 mg by mouth 2 (two) times daily.      pantoprazole (PROTONIX) 40 MG tablet Take 40 mg by mouth daily      spironolactone (ALDACTONE) 50 MG tablet Take 50 mg by mouth once daily.      torsemide (DEMADEX) 20 MG Tab Take 20 mg by mouth once daily.      albuterol (PROVENTIL/VENTOLIN HFA) 90 mcg/actuation inhaler Inhale 2 puffs into the lungs every 6 (six) hours as needed for Shortness of Breath. Rescue      amitriptyline (ELAVIL) 10 MG tablet Take 10 mg by mouth nightly as needed for Insomnia.      aspirin 81 MG Chew Take 81 mg by mouth once daily.      atorvastatin (LIPITOR) 80 MG tablet Take 80 mg by mouth every evening.      cyclobenzaprine (FLEXERIL) 5 MG tablet Take 5 mg by mouth 2 (two) times daily       neomycin-polymyxin-hydrocortisone (CORTISPORIN) 3.5-10,000-1 mg/mL-unit/mL-% otic suspension Place 3 drops into both ears 3 (three) times daily.   "    NIFEdipine (ADALAT CC) 30 MG TbSR Take 1 tablet by mouth once daily.      ondansetron (ZOFRAN-ODT) 4 MG TbDL Take 1 tablet (4 mg total) by mouth every 6 (six) hours       OZEMPIC 0.25 mg or 0.5 mg(2 mg/1.5 mL) pen injector Inject 0.5 mg into the skin every Wednesday.      SYMBICORT 80-4.5 mcg/actuation HFAA Inhale 2 puffs into the lungs 2 (two) times a day.      vitamin D (VITAMIN D3) 1000 units Tab Take 1,000 Units by mouth once daily.      vitamin E 100 UNIT capsule Take 100 Units by mouth once daily.       Pili Nino  [id number]          .

## 2025-04-19 NOTE — HPI
Christie Damian is a 69 y.o. female with a PMHx of CAD s/p stents, DM, HTN, CHF who presents to [facility] [place], [place] for evaluation of chest pain. Patient reports intermittent substernal chest pain for the past few days. Pain typically worsens with exertion such as walking and improves with rest. She also has upper back pain with tenderness to touch of her upper back muscles. Chest pain is reproducibile upon palptation of chest wall. She also reports SOB with exertion and orthopnea. She has chronic left lower extremity swelling compared to RLE without recent worsening. Also reports frequent issues with acid reflux/GERD. Currently CP free. Denies fever, chills, N/V, abdominal pain or syncope.     ED: AFVSS. No leukocytosis or electrolyte abnormalities. Trop neg x2. BNP 12. EKG with non-specific TWAs. CTA negative for PE but shows suspect GERD and pulm edema.

## 2025-04-19 NOTE — ED NOTES
Pt care assumed. Report received by SANDRA sheets. Pt lying in stretcher in low and locked position and side rails raised x2. Call light, pt's belongings, and bedside table within pt's reach. Pt on continuous cardiac monitoring, pulse oximetry, and BP cycling every 30 minutes. Pt in NAD and verbalized no needs at this time.

## 2025-04-19 NOTE — H&P
[facility] - Emergency Dept  [facility] Medicine  History & Physical    Patient Name: Christie Damian  MRN: [id number]  Patient Class: OP- Observation  Admission Date: 4/18/2025  Attending Physician: Harshil Deras MD   Primary Care Provider: [facility] Primary Doctor         Patient information was obtained from patient, past medical records, and ER records.     Subjective:     Principal Problem:Chest pain, rule out acute myocardial infarction    Chief Complaint:   Chief Complaint   Patient presents with    Chest Pain     Constant mid sternal chest pain x 1 week with lower back pain, +SOB and nausea, denies emesis, diarrhea, hx stents         HPI: Christie Damian is a 69 y.o. female with a PMHx of CAD s/p stents, DM, HTN, CHF who presents to [facility] [place] for evaluation of chest pain. Patient reports intermittent substernal chest pain for the past few days. Pain typically worsens with exertion such as walking and improves with rest. She also has upper back pain with tenderness to touch of her upper back muscles. Chest pain is reproducibile upon palptation of chest wall. She also reports SOB with exertion and orthopnea. She has chronic left lower extremity swelling compared to RLE without recent worsening. Also reports frequent issues with acid reflux/GERD. Currently CP free. Denies fever, chills, N/V, abdominal pain or syncope.     ED: AFVSS. No leukocytosis or electrolyte abnormalities. Trop neg x2. BNP 12. EKG with non-specific TWAs. CTA negative for PE but shows suspect GERD and pulm edema.     Past Medical History:   Diagnosis Date    Arthritis     Cataract     Diabetes mellitus     Hyperlipidemia     Hypertension     Pre-diabetes     Sciatica        Past Surgical History:   Procedure Laterality Date    CORONARY STENT PLACEMENT      2 stents    HYSTERECTOMY      LEG SURGERY      TONSILLECTOMY         Review of patient's allergies indicates:   Allergen Reactions    Codeine Nausea And Vomiting    Lisinopril        No current  facility-administered medications on file prior to encounter.     Current Outpatient Medications on File Prior to Encounter   Medication Sig    gabapentin (NEURONTIN) 300 MG capsule Take 1 capsule (300 mg total) by mouth 3 (three) times daily.    losartan (COZAAR) 100 MG tablet Take 100 mg by mouth once daily.    metFORMIN (GLUCOPHAGE-XR) 500 MG ER 24hr tablet Take 1,000 mg by mouth 2 (two) times daily.    pantoprazole (PROTONIX) 40 MG tablet Take 40 mg by mouth daily as needed.    spironolactone (ALDACTONE) 50 MG tablet Take 50 mg by mouth once daily.    torsemide (DEMADEX) 20 MG Tab Take 20 mg by mouth once daily.    albuterol (PROVENTIL/VENTOLIN HFA) 90 mcg/actuation inhaler Inhale 2 puffs into the lungs every 6 (six) hours as needed for Shortness of Breath. Rescue    amitriptyline (ELAVIL) 10 MG tablet Take 10 mg by mouth nightly as needed for Insomnia.    ascorbic acid, vitamin C, (VITAMIN C) 500 MG tablet Take 500 mg by mouth once daily.    aspirin 81 MG Chew Take 81 mg by mouth once daily.    atorvastatin (LIPITOR) 80 MG tablet Take 80 mg by mouth every evening.    cyanocobalamin (VITAMIN B-12) 100 MCG tablet Take 100 mcg by mouth once daily.    cyclobenzaprine (FLEXERIL) 5 MG tablet Take 5 mg by mouth 2 (two) times daily as needed.    neomycin-polymyxin-hydrocortisone (CORTISPORIN) 3.5-10,000-1 mg/mL-unit/mL-% otic suspension Place 3 drops into both ears 3 (three) times daily.    NIFEdipine (ADALAT CC) 30 MG TbSR Take 1 tablet by mouth once daily.    ondansetron (ZOFRAN-ODT) 4 MG TbDL Take 1 tablet (4 mg total) by mouth every 6 (six) hours as needed.    OZEMPIC 0.25 mg or 0.5 mg(2 mg/1.5 mL) pen injector Inject 0.5 mg into the skin every Wednesday.    potassium chloride (K-TAB) 20 mEq Take 20 mEq by mouth once daily.    prasugreL (EFFIENT) 5 mg Tab Take 10 mg by mouth once daily.    propranoloL (INDERAL) 80 MG tablet Take 80 mg by mouth 2 (two) times daily.    SYMBICORT 80-4.5 mcg/actuation HFAA Inhale 2  puffs into the lungs 2 (two) times a day.    vitamin D (VITAMIN D3) 1000 units Tab Take 1,000 Units by mouth once daily.    vitamin E 100 UNIT capsule Take 100 Units by mouth once daily.    [DISCONTINUED] amLODIPine (NORVASC) 5 MG tablet Take 5 mg by mouth once daily.    [DISCONTINUED] metoprolol tartrate (LOPRESSOR) 100 MG tablet Take 100 mg by mouth once daily.     Family History       Problem Relation (Age of Onset)    Diabetes Mother    Glaucoma Mother    No Known Problems Father, Sister, Brother, Maternal Aunt, Maternal Uncle, Paternal Aunt, Paternal Uncle, Maternal Grandmother, Maternal Grandfather, Paternal Grandmother, Paternal Grandfather          Tobacco Use    Smoking status: Never    Smokeless tobacco: Never   Substance and Sexual Activity    Alcohol use: No    Drug use: Never    Sexual activity: Not Currently     Review of Systems   Constitutional:  Negative for chills, fatigue and fever.   HENT:  Negative for trouble swallowing and voice change.    Eyes:  Negative for photophobia and visual disturbance.   Respiratory:  Positive for shortness of breath. Negative for chest tightness and wheezing.    Cardiovascular:  Positive for chest pain and leg swelling. Negative for palpitations.   Gastrointestinal:  Negative for abdominal pain, constipation, diarrhea, nausea and vomiting.   Genitourinary:  Negative for dysuria, flank pain, hematuria and urgency.   Musculoskeletal:  Positive for back pain. Negative for arthralgias and gait problem.   Skin:  Negative for color change and wound.   Neurological:  Negative for dizziness, syncope, weakness, light-headedness and numbness.   Psychiatric/Behavioral:  Negative for behavioral problems and decreased concentration.      Objective:     Vital Signs (Most Recent):  Temp: 98.4 °F (36.9 °C) (04/18/25 2326)  Pulse: 72 (04/19/25 0000)  Resp: 18 (04/18/25 2353)  BP: (!) 215/95 (04/19/25 0000)  SpO2: 95 % (04/19/25 0000) Vital Signs (24h Range):  Temp:  [98.2 °F (36.8  °C)-98.4 °F (36.9 °C)] 98.4 °F (36.9 °C)  Pulse:  [71-81] 72  Resp:  [18-20] 18  SpO2:  [95 %-99 %] 95 %  BP: (122-215)/(60-95) 215/95     Weight: 98 kg (216 lb)  Body mass index is 42.18 kg/m².     Physical Exam  Vitals and nursing note reviewed.   Constitutional:       General: She is not in acute distress.     Appearance: She is well-developed. She is obese.   HENT:      Head: Normocephalic and atraumatic.      Mouth/Throat:      Pharynx: No oropharyngeal exudate.   Eyes:      General: No scleral icterus.     Conjunctiva/sclera: Conjunctivae normal.   Cardiovascular:      Rate and Rhythm: Normal rate and regular rhythm.      Heart sounds: Normal heart sounds.   Pulmonary:      Effort: Pulmonary effort is normal. No respiratory distress.      Breath sounds: No wheezing.      Comments: Diminished BS to BLL  Chest:      Chest wall: Tenderness present.   Abdominal:      General: Bowel sounds are normal. There is no distension.      Palpations: Abdomen is soft.      Tenderness: There is no abdominal tenderness.   Musculoskeletal:         General: Tenderness (upper back MSKL) present. Normal range of motion.      Cervical back: Normal range of motion and neck supple.      Right lower leg: No edema.      Left lower leg: Edema (trace LE edema) present.   Lymphadenopathy:      Cervical: No cervical adenopathy.   Skin:     General: Skin is warm and dry.      Capillary Refill: Capillary refill takes less than 2 seconds.      Findings: No rash.   Neurological:      Mental Status: She is alert and oriented to person, place, and time.      Cranial Nerves: No cranial nerve deficit.      Sensory: No sensory deficit.      Coordination: Coordination normal.   Psychiatric:         Behavior: Behavior normal.         Thought Content: Thought content normal.         Judgment: Judgment normal.                Significant Labs: All pertinent labs within the past 24 hours have been reviewed.  CBC:   Recent Labs   Lab 04/18/25  1705   WBC  5.89   HGB 11.1*   HCT 35.0*        CMP:   Recent Labs   Lab 04/18/25  1604      K 4.7      CO2 18*   BUN 12   CREATININE 1.1   CALCIUM 9.6   ALBUMIN 3.9   BILITOT 0.4   ALKPHOS 68   AST 21   ALT 17   ANIONGAP 11       Significant Imaging: I have reviewed all pertinent imaging results/findings within the past 24 hours.  CTA Chest Non-Coronary (PE Studies)  Narrative: EXAMINATION:  CTA CHEST NON CORONARY (PE STUDIES)    CLINICAL HISTORY:  Pulmonary embolism (PE) suspected, high prob;    TECHNIQUE:  Low dose axial images, sagittal and coronal reformations were obtained from the thoracic inlet to the lung bases following the IV administration of 100 mL of Omnipaque 350.  Contrast timing was optimized to evaluate the pulmonary arteries.    COMPARISON:  Chest radiograph 12/30/2025, CTA chest abdomen pelvis 04/21/2025, CTA chest 07/23/2023    FINDINGS:  Pulmonary vasculature: Satisfactory opacification of the pulmonary arterial system with no filling defect to the segmental level.  Main pulmonary artery is normal in diameter.    Trace intraluminal gas in the pulmonary trunk, with estimated volume of less than 2 mL.    Aorta: Left-sided aortic arch.  No aneurysm and no significant atherosclerosis    Base of Neck: No significant abnormality.  Thyroid gland appears normal.    Thoracic soft tissues: No lymphadenopathy or soft tissue lesion.    Heart: Mildly enlarged with trace pericardial effusion and mild calcific coronary artery atherosclerosis.    Rosette/Mediastinum: No pathologic marisela enlargement.    Airways: Patent.  Bronchial wall thickening.    Lungs/Pleura: Patchy ground-glass opacities bilaterally without focal consolidation or pleural effusion.  No pneumothorax.  Few punctate calcified granulomas.  Bibasilar linear bandlike opacities suggestive of subsegmental atelectasis versus scarring.    Esophagus: Normal thoracic esophageal course.  Small hiatal hernia.  A small quantity of intraluminal gas  and fluid scattered within the thoracic esophagus is nonspecific, with gastroesophageal reflux or esophageal dysmotility among the considerations.    Upper Abdomen: No abnormality of the partially imaged upper abdomen.    Bones: No acute fracture. No suspicious lytic or sclerotic lesions.  Impression: No evidence of pulmonary embolism to the level of the segmental pulmonary arteries.    Patchy pulmonary ground-glass favored to represent pulmonary edema.    Small hiatal hernia with suspected GERD and/or mild esophageal dysmotility.  Correlate clinically.  Consider outpatient workup such as EGD and/or esophagram.    Scattered atherosclerotic changes, including poorly visualized coronary artery disease.    Allowing for artifacts, there is no scan evidence of aneurysm, dissection, or other acute syndrome involving the thoracic aorta.    Electronically signed by resident: Shubham Reddy  Date:    04/18/2025  Time:    20:16    Electronically signed by: Brandon Meza  Date:    04/18/2025  Time:    21:20      Assessment/Plan:     Assessment & Plan  Chest pain, rule out acute myocardial infarction  - suspect multifactorial from MSKL given reproducibility on exam, slight volume overload, and GERD  - trop neg x2, trend  - EKG with nonspecific TWAs  - CTA neg for PE, shows GERD and pulm edema  - monitor response to diuresis  - echo in AM  - monitor tele   Acute on chronic diastolic heart failure  - very slightly volume overloaded on exam  - BNP WNL  - CTA shows pulm edema   - s/p 40mg lasix IVP in the ED  - further IV vs PO diuresis pending response  - strict I/Os, daily weights  - repeat echo  Hyperlipidemia  - continue statin   Hypertension  Patient's blood pressure range in the last 24 hours was: BP  Min: 122/68  Max: 215/95.The patient's inpatient anti-hypertensive regimen is listed below:  Current Antihypertensives  losartan tablet 100 mg, Daily, Oral  spironolactone tablet 50 mg, Daily, Oral  , Daily, Oral  NIFEdipine 24  hr tablet 30 mg, Daily, Oral    Plan  - BP is controlled, no changes needed to their regimen  DM II (diabetes mellitus, type II), controlled  - hold home orals   - LDSSI, ACHS accuchecks  - repeat A1c in AM  Lab Results   Component Value Date    HGBA1C 6.0 (H) 04/21/2024     Severe obesity (BMI >= 40)  Body mass index is 42.18 kg/m². Morbid obesity complicates all aspects of disease management from diagnostic modalities to treatment.   CAD (coronary artery disease), native coronary artery  Patient with known CAD s/p stent placement, which is controlled Will continue ASA and Statin and monitor for S/Sx of angina/ACS. Continue to monitor on telemetry.   VTE Risk Mitigation (From admission, onward)           Ordered     enoxaparin injection 40 mg  Daily         04/18/25 2228     IP VTE HIGH RISK PATIENT  Once         04/18/25 2228                         On 04/19/2025, patient should be placed in hospital observation services under my care in collaboration with Dr. Ricks.           Makenzie Pena PA-C  Department of Hospital Medicine  [facility] - Emergency Dept

## 2025-04-19 NOTE — HOSPITAL COURSE
Patient admitted for intermittent chest pain and shortness of breath. Has history of diastolic heart failure. Troponins <3 x3. EKG with non-specific TWA. During admission, there was reproducibility of chest pain with palpation. Placed on telemetry with no abnormalities. CXR with mild pulmonary edema and given IV lasix 40mg x1 with resolution of BURCH, saturating on room air. Reports was taken off of home lasix recently. TTE with grade 1 diastolic dysfunction, otherwise unremarkable. Will discharge patient on lasix 40mg daily and follow up with cardiology and PCP. Patient agreeable to plan and to discharge. Patient seen and evaluated on day of discharge. Medications were discussed and reviewed, outpatient follow-up scheduled, ER precautions were given, all questions were answered to the pt's satisfaction, and patient was subsequently discharged.

## 2025-04-19 NOTE — ASSESSMENT & PLAN NOTE
- suspect multifactorial from MSKL given reproducibility on exam, slight volume overload, and GERD  - trop neg x2, trend  - EKG with nonspecific TWAs  - CTA neg for PE, shows GERD and pulm edema  - monitor response to diuresis  - echo in AM  - monitor tele

## 2025-04-19 NOTE — ASSESSMENT & PLAN NOTE
- very slightly volume overloaded on exam  - BNP WNL  - CTA shows pulm edema   - s/p 40mg lasix IVP in the ED  - further IV vs PO diuresis pending response  - strict I/Os, daily weights  - repeat echo

## 2025-04-19 NOTE — ED NOTES
Report received from Brielle BURK RN. Assume care of pt. Pt resting comfortably and independently repositioned in stretcher with bed locked in lowest position for safety. NAD noted at this time. Respirations even and unlabored and visible chest rise noted.  Patient offered bathroom assistance and denies need at this time. Pt instructed to call if assistance is needed. Pt on continuous cardiac, BP, and O2 monitoring. Call light within reach.  No needs at this time. Will continue to monitor.

## 2025-04-19 NOTE — ASSESSMENT & PLAN NOTE
- hold home orals   - LDSSI, ACHS accuchecks  - repeat A1c in AM  Lab Results   Component Value Date    HGBA1C 6.0 (H) 04/21/2024

## 2025-04-19 NOTE — ASSESSMENT & PLAN NOTE
Body mass index is 42.18 kg/m². Morbid obesity complicates all aspects of disease management from diagnostic modalities to treatment.

## 2025-04-19 NOTE — ED NOTES
Provider made aware of patient blood pressure of 215/95, patient asymptomatic at this time, and no complaints at this time

## 2025-04-19 NOTE — ASSESSMENT & PLAN NOTE
Patient's blood pressure range in the last 24 hours was: BP  Min: 122/68  Max: 215/95.The patient's inpatient anti-hypertensive regimen is listed below:  Current Antihypertensives  losartan tablet 100 mg, Daily, Oral  spironolactone tablet 50 mg, Daily, Oral  , Daily, Oral  NIFEdipine 24 hr tablet 30 mg, Daily, Oral    Plan  - BP is controlled, no changes needed to their regimen

## 2025-04-19 NOTE — ED NOTES
Received report from Ada FLORES. Assumed care of patient. Patient is alert and resting comfortably in bed in NAD. BP, cardiac, and O2 monitoring continued.  Patient updated on plan of care, pt denies any needs or complaints at this time. Bed locked in lowest position, side rails up x2, call light within reach. VSS.

## 2025-04-19 NOTE — SUBJECTIVE & OBJECTIVE
Past Medical History:   Diagnosis Date    Arthritis     Cataract     Diabetes mellitus     Hyperlipidemia     Hypertension     Pre-diabetes     Sciatica        Past Surgical History:   Procedure Laterality Date    CORONARY STENT PLACEMENT      2 stents    HYSTERECTOMY      LEG SURGERY      TONSILLECTOMY         Review of patient's allergies indicates:   Allergen Reactions    Codeine Nausea And Vomiting    Lisinopril        No current facility-administered medications on file prior to encounter.     Current Outpatient Medications on File Prior to Encounter   Medication Sig    gabapentin (NEURONTIN) 300 MG capsule Take 1 capsule (300 mg total) by mouth 3 (three) times daily.    losartan (COZAAR) 100 MG tablet Take 100 mg by mouth once daily.    metFORMIN (GLUCOPHAGE-XR) 500 MG ER 24hr tablet Take 1,000 mg by mouth 2 (two) times daily.    pantoprazole (PROTONIX) 40 MG tablet Take 40 mg by mouth daily as needed.    spironolactone (ALDACTONE) 50 MG tablet Take 50 mg by mouth once daily.    torsemide (DEMADEX) 20 MG Tab Take 20 mg by mouth once daily.    albuterol (PROVENTIL/VENTOLIN HFA) 90 mcg/actuation inhaler Inhale 2 puffs into the lungs every 6 (six) hours as needed for Shortness of Breath. Rescue    amitriptyline (ELAVIL) 10 MG tablet Take 10 mg by mouth nightly as needed for Insomnia.    ascorbic acid, vitamin C, (VITAMIN C) 500 MG tablet Take 500 mg by mouth once daily.    aspirin 81 MG Chew Take 81 mg by mouth once daily.    atorvastatin (LIPITOR) 80 MG tablet Take 80 mg by mouth every evening.    cyanocobalamin (VITAMIN B-12) 100 MCG tablet Take 100 mcg by mouth once daily.    cyclobenzaprine (FLEXERIL) 5 MG tablet Take 5 mg by mouth 2 (two) times daily as needed.    neomycin-polymyxin-hydrocortisone (CORTISPORIN) 3.5-10,000-1 mg/mL-unit/mL-% otic suspension Place 3 drops into both ears 3 (three) times daily.    NIFEdipine (ADALAT CC) 30 MG TbSR Take 1 tablet by mouth once daily.    ondansetron (ZOFRAN-ODT) 4  MG TbDL Take 1 tablet (4 mg total) by mouth every 6 (six) hours as needed.    OZEMPIC 0.25 mg or 0.5 mg(2 mg/1.5 mL) pen injector Inject 0.5 mg into the skin every Wednesday.    potassium chloride (K-TAB) 20 mEq Take 20 mEq by mouth once daily.    prasugreL (EFFIENT) 5 mg Tab Take 10 mg by mouth once daily.    propranoloL (INDERAL) 80 MG tablet Take 80 mg by mouth 2 (two) times daily.    SYMBICORT 80-4.5 mcg/actuation HFAA Inhale 2 puffs into the lungs 2 (two) times a day.    vitamin D (VITAMIN D3) 1000 units Tab Take 1,000 Units by mouth once daily.    vitamin E 100 UNIT capsule Take 100 Units by mouth once daily.    [DISCONTINUED] amLODIPine (NORVASC) 5 MG tablet Take 5 mg by mouth once daily.    [DISCONTINUED] metoprolol tartrate (LOPRESSOR) 100 MG tablet Take 100 mg by mouth once daily.     Family History       Problem Relation (Age of Onset)    Diabetes Mother    Glaucoma Mother    No Known Problems Father, Sister, Brother, Maternal Aunt, Maternal Uncle, Paternal Aunt, Paternal Uncle, Maternal Grandmother, Maternal Grandfather, Paternal Grandmother, Paternal Grandfather          Tobacco Use    Smoking status: Never    Smokeless tobacco: Never   Substance and Sexual Activity    Alcohol use: No    Drug use: Never    Sexual activity: Not Currently     Review of Systems   Constitutional:  Negative for chills, fatigue and fever.   HENT:  Negative for trouble swallowing and voice change.    Eyes:  Negative for photophobia and visual disturbance.   Respiratory:  Positive for shortness of breath. Negative for chest tightness and wheezing.    Cardiovascular:  Positive for chest pain and leg swelling. Negative for palpitations.   Gastrointestinal:  Negative for abdominal pain, constipation, diarrhea, nausea and vomiting.   Genitourinary:  Negative for dysuria, flank pain, hematuria and urgency.   Musculoskeletal:  Positive for back pain. Negative for arthralgias and gait problem.   Skin:  Negative for color change and  wound.   Neurological:  Negative for dizziness, syncope, weakness, light-headedness and numbness.   Psychiatric/Behavioral:  Negative for behavioral problems and decreased concentration.      Objective:     Vital Signs (Most Recent):  Temp: 98.4 °F (36.9 °C) (04/18/25 2326)  Pulse: 72 (04/19/25 0000)  Resp: 18 (04/18/25 2353)  BP: (!) 215/95 (04/19/25 0000)  SpO2: 95 % (04/19/25 0000) Vital Signs (24h Range):  Temp:  [98.2 °F (36.8 °C)-98.4 °F (36.9 °C)] 98.4 °F (36.9 °C)  Pulse:  [71-81] 72  Resp:  [18-20] 18  SpO2:  [95 %-99 %] 95 %  BP: (122-215)/(60-95) 215/95     Weight: 98 kg (216 lb)  Body mass index is 42.18 kg/m².     Physical Exam  Vitals and nursing note reviewed.   Constitutional:       General: She is not in acute distress.     Appearance: She is well-developed. She is obese.   HENT:      Head: Normocephalic and atraumatic.      Mouth/Throat:      Pharynx: No oropharyngeal exudate.   Eyes:      General: No scleral icterus.     Conjunctiva/sclera: Conjunctivae normal.   Cardiovascular:      Rate and Rhythm: Normal rate and regular rhythm.      Heart sounds: Normal heart sounds.   Pulmonary:      Effort: Pulmonary effort is normal. No respiratory distress.      Breath sounds: No wheezing.      Comments: Diminished BS to BLL  Chest:      Chest wall: Tenderness present.   Abdominal:      General: Bowel sounds are normal. There is no distension.      Palpations: Abdomen is soft.      Tenderness: There is no abdominal tenderness.   Musculoskeletal:         General: Tenderness (upper back MSKL) present. Normal range of motion.      Cervical back: Normal range of motion and neck supple.      Right lower leg: No edema.      Left lower leg: Edema (trace LE edema) present.   Lymphadenopathy:      Cervical: No cervical adenopathy.   Skin:     General: Skin is warm and dry.      Capillary Refill: Capillary refill takes less than 2 seconds.      Findings: No rash.   Neurological:      Mental Status: She is alert and  oriented to person, place, and time.      Cranial Nerves: No cranial nerve deficit.      Sensory: No sensory deficit.      Coordination: Coordination normal.   Psychiatric:         Behavior: Behavior normal.         Thought Content: Thought content normal.         Judgment: Judgment normal.                Significant Labs: All pertinent labs within the past 24 hours have been reviewed.  CBC:   Recent Labs   Lab 04/18/25  1705   WBC 5.89   HGB 11.1*   HCT 35.0*        CMP:   Recent Labs   Lab 04/18/25  1604      K 4.7      CO2 18*   BUN 12   CREATININE 1.1   CALCIUM 9.6   ALBUMIN 3.9   BILITOT 0.4   ALKPHOS 68   AST 21   ALT 17   ANIONGAP 11       Significant Imaging: I have reviewed all pertinent imaging results/findings within the past 24 hours.  CTA Chest Non-Coronary (PE Studies)  Narrative: EXAMINATION:  CTA CHEST NON CORONARY (PE STUDIES)    CLINICAL HISTORY:  Pulmonary embolism (PE) suspected, high prob;    TECHNIQUE:  Low dose axial images, sagittal and coronal reformations were obtained from the thoracic inlet to the lung bases following the IV administration of 100 mL of Omnipaque 350.  Contrast timing was optimized to evaluate the pulmonary arteries.    COMPARISON:  Chest radiograph 12/30/2025, CTA chest abdomen pelvis 04/21/2025, CTA chest 07/23/2023    FINDINGS:  Pulmonary vasculature: Satisfactory opacification of the pulmonary arterial system with no filling defect to the segmental level.  Main pulmonary artery is normal in diameter.    Trace intraluminal gas in the pulmonary trunk, with estimated volume of less than 2 mL.    Aorta: Left-sided aortic arch.  No aneurysm and no significant atherosclerosis    Base of Neck: No significant abnormality.  Thyroid gland appears normal.    Thoracic soft tissues: No lymphadenopathy or soft tissue lesion.    Heart: Mildly enlarged with trace pericardial effusion and mild calcific coronary artery atherosclerosis.    Rosette/Mediastinum: No  pathologic marisela enlargement.    Airways: Patent.  Bronchial wall thickening.    Lungs/Pleura: Patchy ground-glass opacities bilaterally without focal consolidation or pleural effusion.  No pneumothorax.  Few punctate calcified granulomas.  Bibasilar linear bandlike opacities suggestive of subsegmental atelectasis versus scarring.    Esophagus: Normal thoracic esophageal course.  Small hiatal hernia.  A small quantity of intraluminal gas and fluid scattered within the thoracic esophagus is nonspecific, with gastroesophageal reflux or esophageal dysmotility among the considerations.    Upper Abdomen: No abnormality of the partially imaged upper abdomen.    Bones: No acute fracture. No suspicious lytic or sclerotic lesions.  Impression: No evidence of pulmonary embolism to the level of the segmental pulmonary arteries.    Patchy pulmonary ground-glass favored to represent pulmonary edema.    Small hiatal hernia with suspected GERD and/or mild esophageal dysmotility.  Correlate clinically.  Consider outpatient workup such as EGD and/or esophagram.    Scattered atherosclerotic changes, including poorly visualized coronary artery disease.    Allowing for artifacts, there is no scan evidence of aneurysm, dissection, or other acute syndrome involving the thoracic aorta.    Electronically signed by resident: Shubham Reddy  Date:    04/18/2025  Time:    20:16    Electronically signed by: Brandon Meza  Date:    04/18/2025  Time:    21:20

## 2025-04-19 NOTE — ED NOTES
Bed: 35  Expected date: 4/19/25  Expected time: 9:12 AM  Means of arrival:   Comments:  ADITYA Gomez

## 2025-04-19 NOTE — ED NOTES
Assumed care for pt after recieving report from previous HEIDI Randolph. Pt resting in bed in NAD, RR e/u. Vital signs stable and WDL at this time of assessment. Pt offered bathroom assistance and denies need at this time. Patient ambulates independently with steady gait. Explanation of care/wait provided. Pt verbalizes no needs at this time. Bed in low, locked position with rails up and call bell in reach. Pt's white board updated with today's care team and plan.

## 2025-04-21 LAB — POCT GLUCOSE: 105 MG/DL (ref 70–110)

## 2025-05-16 ENCOUNTER — TELEPHONE (OUTPATIENT)
Dept: PRIMARY CARE CLINIC | Facility: CLINIC | Age: 70
End: 2025-05-16
Payer: MEDICARE

## 2025-05-16 NOTE — TELEPHONE ENCOUNTER
[facility] - Attempted to call patient and schedule a new PCP appt. JONAH with instructions to call [facility].